# Patient Record
Sex: FEMALE | Race: WHITE | NOT HISPANIC OR LATINO | Employment: FULL TIME | URBAN - METROPOLITAN AREA
[De-identification: names, ages, dates, MRNs, and addresses within clinical notes are randomized per-mention and may not be internally consistent; named-entity substitution may affect disease eponyms.]

---

## 2017-04-25 ENCOUNTER — ALLSCRIPTS OFFICE VISIT (OUTPATIENT)
Dept: OTHER | Facility: OTHER | Age: 52
End: 2017-04-25

## 2017-05-01 ENCOUNTER — LAB CONVERSION - ENCOUNTER (OUTPATIENT)
Dept: OTHER | Facility: OTHER | Age: 52
End: 2017-05-01

## 2017-05-01 LAB
ADDITIONAL INFORMATION (HISTORICAL): NORMAL
ADEQUACY: (HISTORICAL): NORMAL
COMMENT (HISTORICAL): NORMAL
CYTOTECHNOLOGIST: (HISTORICAL): NORMAL
HPV MRNA E6/E7 (HISTORICAL): NOT DETECTED
INTERPRETATION (HISTORICAL): NORMAL
LMP (HISTORICAL): NORMAL
PREV. BX: (HISTORICAL): NORMAL
PREV. PAP (HISTORICAL): NORMAL
SOURCE (HISTORICAL): NORMAL

## 2017-05-24 ENCOUNTER — GENERIC CONVERSION - ENCOUNTER (OUTPATIENT)
Dept: OTHER | Facility: OTHER | Age: 52
End: 2017-05-24

## 2017-05-25 ENCOUNTER — HOSPITAL ENCOUNTER (OUTPATIENT)
Dept: MAMMOGRAPHY | Facility: MEDICAL CENTER | Age: 52
Discharge: HOME | End: 2017-05-25
Payer: COMMERCIAL

## 2017-05-25 DIAGNOSIS — Z12.31 ENCOUNTER FOR SCREENING MAMMOGRAM FOR MALIGNANT NEOPLASM OF BREAST: ICD-10-CM

## 2017-05-25 PROCEDURE — G0202 SCR MAMMO BI INCL CAD: HCPCS

## 2018-01-14 VITALS
BODY MASS INDEX: 34.27 KG/M2 | WEIGHT: 213.25 LBS | HEIGHT: 66 IN | DIASTOLIC BLOOD PRESSURE: 82 MMHG | SYSTOLIC BLOOD PRESSURE: 144 MMHG

## 2018-06-13 DIAGNOSIS — Z12.31 ENCOUNTER FOR SCREENING MAMMOGRAM FOR MALIGNANT NEOPLASM OF BREAST: Primary | ICD-10-CM

## 2018-07-02 ENCOUNTER — APPOINTMENT (OUTPATIENT)
Dept: LAB | Facility: CLINIC | Age: 53
End: 2018-07-02
Payer: COMMERCIAL

## 2018-07-02 ENCOUNTER — TRANSCRIBE ORDERS (OUTPATIENT)
Dept: LAB | Facility: CLINIC | Age: 53
End: 2018-07-02

## 2018-07-02 DIAGNOSIS — I15.9 SECONDARY HYPERTENSION: Primary | ICD-10-CM

## 2018-07-02 DIAGNOSIS — I15.9 SECONDARY HYPERTENSION: ICD-10-CM

## 2018-07-02 LAB
ALBUMIN SERPL BCP-MCNC: 3.9 G/DL (ref 3.5–5)
ALP SERPL-CCNC: 30 U/L (ref 46–116)
ALT SERPL W P-5'-P-CCNC: 29 U/L (ref 12–78)
ANION GAP SERPL CALCULATED.3IONS-SCNC: 8 MMOL/L (ref 4–13)
AST SERPL W P-5'-P-CCNC: 15 U/L (ref 5–45)
BASOPHILS # BLD AUTO: 0.05 THOUSANDS/ΜL (ref 0–0.1)
BASOPHILS NFR BLD AUTO: 1 % (ref 0–1)
BILIRUB SERPL-MCNC: 0.2 MG/DL (ref 0.2–1)
BUN SERPL-MCNC: 15 MG/DL (ref 5–25)
CALCIUM SERPL-MCNC: 9.5 MG/DL (ref 8.3–10.1)
CHLORIDE SERPL-SCNC: 103 MMOL/L (ref 100–108)
CHOLEST SERPL-MCNC: 232 MG/DL (ref 50–200)
CO2 SERPL-SCNC: 28 MMOL/L (ref 21–32)
CREAT SERPL-MCNC: 1 MG/DL (ref 0.6–1.3)
EOSINOPHIL # BLD AUTO: 0.27 THOUSAND/ΜL (ref 0–0.61)
EOSINOPHIL NFR BLD AUTO: 4 % (ref 0–6)
ERYTHROCYTE [DISTWIDTH] IN BLOOD BY AUTOMATED COUNT: 13.3 % (ref 11.6–15.1)
GFR SERPL CREATININE-BSD FRML MDRD: 65 ML/MIN/1.73SQ M
GLUCOSE P FAST SERPL-MCNC: 94 MG/DL (ref 65–99)
HCT VFR BLD AUTO: 38.6 % (ref 34.8–46.1)
HDLC SERPL-MCNC: 45 MG/DL (ref 40–60)
HGB BLD-MCNC: 12.8 G/DL (ref 11.5–15.4)
LDLC SERPL CALC-MCNC: 146 MG/DL (ref 0–100)
LYMPHOCYTES # BLD AUTO: 2.66 THOUSANDS/ΜL (ref 0.6–4.47)
LYMPHOCYTES NFR BLD AUTO: 36 % (ref 14–44)
MCH RBC QN AUTO: 31.1 PG (ref 26.8–34.3)
MCHC RBC AUTO-ENTMCNC: 33.2 G/DL (ref 31.4–37.4)
MCV RBC AUTO: 94 FL (ref 82–98)
MONOCYTES # BLD AUTO: 0.67 THOUSAND/ΜL (ref 0.17–1.22)
MONOCYTES NFR BLD AUTO: 9 % (ref 4–12)
NEUTROPHILS # BLD AUTO: 3.8 THOUSANDS/ΜL (ref 1.85–7.62)
NEUTS SEG NFR BLD AUTO: 51 % (ref 43–75)
NONHDLC SERPL-MCNC: 187 MG/DL
PLATELET # BLD AUTO: 274 THOUSANDS/UL (ref 149–390)
PMV BLD AUTO: 9.4 FL (ref 8.9–12.7)
POTASSIUM SERPL-SCNC: 4.3 MMOL/L (ref 3.5–5.3)
PROT SERPL-MCNC: 7.6 G/DL (ref 6.4–8.2)
RBC # BLD AUTO: 4.12 MILLION/UL (ref 3.81–5.12)
SODIUM SERPL-SCNC: 139 MMOL/L (ref 136–145)
TRIGL SERPL-MCNC: 207 MG/DL
WBC # BLD AUTO: 7.45 THOUSAND/UL (ref 4.31–10.16)

## 2018-07-02 PROCEDURE — 36415 COLL VENOUS BLD VENIPUNCTURE: CPT

## 2018-07-02 PROCEDURE — 85025 COMPLETE CBC W/AUTO DIFF WBC: CPT

## 2018-07-02 PROCEDURE — 80053 COMPREHEN METABOLIC PANEL: CPT

## 2018-07-02 PROCEDURE — 80061 LIPID PANEL: CPT

## 2018-07-09 ENCOUNTER — HOSPITAL ENCOUNTER (OUTPATIENT)
Dept: MAMMOGRAPHY | Facility: MEDICAL CENTER | Age: 53
Discharge: HOME | End: 2018-07-09
Payer: COMMERCIAL

## 2018-07-09 DIAGNOSIS — Z12.31 ENCOUNTER FOR SCREENING MAMMOGRAM FOR MALIGNANT NEOPLASM OF BREAST: ICD-10-CM

## 2018-07-09 PROCEDURE — 77063 BREAST TOMOSYNTHESIS BI: CPT

## 2018-07-09 PROCEDURE — 77067 SCR MAMMO BI INCL CAD: CPT

## 2018-07-23 ENCOUNTER — TRANSCRIBE ORDERS (OUTPATIENT)
Dept: NEUROSURGERY | Facility: CLINIC | Age: 53
End: 2018-07-23

## 2018-07-23 DIAGNOSIS — M54.50 LOWER BACK PAIN: Primary | ICD-10-CM

## 2018-08-28 ENCOUNTER — OFFICE VISIT (OUTPATIENT)
Dept: NEUROSURGERY | Facility: CLINIC | Age: 53
End: 2018-08-28
Payer: COMMERCIAL

## 2018-08-28 VITALS
SYSTOLIC BLOOD PRESSURE: 155 MMHG | BODY MASS INDEX: 34.97 KG/M2 | WEIGHT: 217.6 LBS | HEIGHT: 66 IN | HEART RATE: 88 BPM | DIASTOLIC BLOOD PRESSURE: 92 MMHG | TEMPERATURE: 99 F

## 2018-08-28 DIAGNOSIS — M54.59 LUMBAR FACET JOINT PAIN: ICD-10-CM

## 2018-08-28 DIAGNOSIS — M51.9 LUMBAR DISC DISEASE: ICD-10-CM

## 2018-08-28 DIAGNOSIS — M54.50 LOWER BACK PAIN: Primary | ICD-10-CM

## 2018-08-28 PROCEDURE — 99214 OFFICE O/P EST MOD 30 MIN: CPT | Performed by: NEUROLOGICAL SURGERY

## 2018-08-28 RX ORDER — MULTIVITAMIN
1 CAPSULE ORAL DAILY
COMMUNITY

## 2018-08-28 RX ORDER — TRANEXAMIC ACID 650 1/1
TABLET ORAL EVERY 8 HOURS
COMMUNITY
Start: 2017-04-25 | End: 2018-09-19 | Stop reason: SDUPTHER

## 2018-08-28 RX ORDER — METHYLPREDNISOLONE 4 MG/1
TABLET ORAL
Qty: 21 TABLET | Refills: 0 | Status: ON HOLD | OUTPATIENT
Start: 2018-08-28 | End: 2018-12-27

## 2018-08-28 RX ORDER — RIBOFLAVIN (VITAMIN B2) 100 MG
TABLET ORAL DAILY
COMMUNITY

## 2018-08-28 RX ORDER — FLUTICASONE PROPIONATE 50 MCG
SPRAY, SUSPENSION (ML) NASAL
COMMUNITY

## 2018-08-28 RX ORDER — PREGABALIN 75 MG/1
75 CAPSULE ORAL DAILY
Status: ON HOLD | COMMUNITY
End: 2018-12-27

## 2018-08-28 RX ORDER — LANOLIN ALCOHOL/MO/W.PET/CERES
3 CREAM (GRAM) TOPICAL
COMMUNITY

## 2018-08-28 RX ORDER — LISINOPRIL 10 MG/1
10 TABLET ORAL DAILY
Refills: 0 | COMMUNITY
Start: 2018-06-21 | End: 2021-05-05 | Stop reason: ALTCHOICE

## 2018-08-28 RX ORDER — ALBUTEROL SULFATE 90 UG/1
2 AEROSOL, METERED RESPIRATORY (INHALATION) EVERY 6 HOURS
COMMUNITY
Start: 2017-09-13

## 2018-08-28 RX ORDER — CHLORAL HYDRATE 500 MG
CAPSULE ORAL DAILY
COMMUNITY

## 2018-08-28 RX ORDER — ERGOCALCIFEROL 1.25 MG/1
CAPSULE ORAL
Refills: 3 | COMMUNITY
Start: 2018-08-20 | End: 2020-05-22 | Stop reason: ALTCHOICE

## 2018-08-28 RX ORDER — FEXOFENADINE HCL 180 MG/1
TABLET ORAL AS NEEDED
COMMUNITY

## 2018-08-28 RX ORDER — RANITIDINE HCL 75 MG
75 TABLET ORAL 2 TIMES DAILY
COMMUNITY
End: 2020-05-22 | Stop reason: ALTCHOICE

## 2018-08-28 RX ORDER — METHOCARBAMOL 750 MG/1
750 TABLET, FILM COATED ORAL 2 TIMES DAILY
COMMUNITY

## 2018-08-28 RX ORDER — MELOXICAM 15 MG/1
15 TABLET ORAL DAILY
COMMUNITY
Start: 2018-08-14

## 2018-08-28 RX ORDER — DULOXETIN HYDROCHLORIDE 30 MG/1
30 CAPSULE, DELAYED RELEASE ORAL DAILY
COMMUNITY

## 2018-08-28 NOTE — LETTER
August 28, 2018     Niurka Weeks MD  [address] [place] [facility] [id number]    Patient: Chantel Faith   YOB: 1965   Date of Visit: 8/28/2018       Dear Dr Marta Allen: Thank you for referring Elzbieta River to me for evaluation  Below are my notes for this consultation  If you have questions, please do not hesitate to call me  I look forward to following your patient along with you  Sincerely,        Gunner Carroll MD        CC: Chasity Roque MD  8/28/2018  5:15 PM  Sign at close encounter  Office Note - Neurosurgery   Chantel Faith 48 y o  female MRN: [id number]      Assessment:    Patient is stable  Symptoms, as detailed in HPI, continue to significantly impact of patient's quality of life in daily activities  After carefully considering presentation, investigations, functional status and co-morbidities, the risk/benefit profile of surgical intervention is not favorable  31-year-old woman with lower back pain which is likely facetogenic in nature though there is an element of deconditioning as well  SI joint dysfunction seems less likely  This is her dominant symptom  I explained that surgery for lower back pain is generally not effective  I asked her to follow up with her pain specialist to discuss facet rhizotomy seeing as how she had a significant improvement in her lower back pain following facet injections  I provided her with a Medrol Dosepak after reviewing the common side effects and interactions  She has not take NSAIDs while taking a Medrol Dosepak  Would also recommend she try physical therapy aqua therapy for core strengthening and range of motion exercises  I reviewed the signs and symptoms lumbar radiculopathy and asked her to contact my office should any concerns arise  Otherwise,   she will follow up through this office on a p r n  basis                                       History, physical examination and diagnostic tests were reviewed and questions answered  Diagnosis, care plan and treatment options were discussed  The patient understand instructions and will follow up as directed  Plan:    Follow-up: prn    Problem List Items Addressed This Visit        Musculoskeletal and Integument    Lumbar disc disease       Other    Lumbar facet joint pain      Other Visit Diagnoses     Lower back pain    -  Primary    Relevant Medications    Methylprednisolone 4 MG TBPK          Subjective/Objective     Chief Complaint    Low back pain  HPI    51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 and hamstring  There is no particular inciting event  She currently describes 3 to 7/10 pain across the lower back which increases the sitting and is particularly worse at night  She denies any pain, weakness or numbness in the right leg  She describes some pain in the left buttock and hamstring which she rates as 3 to 6/10  This to seems to increase with sitting  When the pain is severe her leg will feel somewhat weak but she denies any numbness in the left leg  Sitting or going from sitting to standing seems to exacerbate her pain while walking seems to improved to some extent  She denies any difficulties with bowel bladder function or changing perineal sensation  Epidural steroid injections were not helpful  Facet injections provided 6 weeks of improved pain, though she did not proceed with rhizotomy  She has not had any SI joint injections  Meloxicam, Robaxin, Lyrica and Cymbalta are somewhat helpful  She has not tried any physical therapy or active therapy  ROS    Review of Systems   Constitutional: Positive for fatigue  HENT: Negative  Eyes: Negative  Respiratory: Positive for shortness of breath (on exertion )  Cardiovascular: Negative  Gastrointestinal: Positive for constipation  Endocrine: Negative      Genitourinary: Positive for urgency  Leakage   Musculoskeletal: Positive for myalgias (across lower back, muscle spasms )  Negative for back pain (left buttock down left leg)  Skin: Negative  Allergic/Immunologic: Positive for environmental allergies (seasonal )  Neurological: Positive for weakness (left leg), light-headedness and headaches (migraines)  Negative for dizziness, seizures, syncope and numbness  Hematological: Does not bruise/bleed easily (patient on omega 3)  Psychiatric/Behavioral: Positive for sleep disturbance (due to pain )  Negative for confusion  Family History    Family History   Problem Relation Age of Onset    Heart disease Father        Social History    Social History     Social History    Marital status: /Civil Union     Spouse name: N/A    Number of children: N/A    Years of education: N/A     Occupational History    Not on file       Social History Main Topics    Smoking status: Never Smoker    Smokeless tobacco: Never Used    Alcohol use Yes      Comment: social    Drug use: No    Sexual activity: Not on file     Other Topics Concern    Not on file     Social History Narrative    No narrative on file       Past Medical History    Past Medical History:   Diagnosis Date    Ankle fracture     Asthma     Cervical spinal stenosis     Closed left arm fracture, sequela     Concussion     Degeneration, intervertebral disc, cervical     Hypertension     Migraine     Right arm fracture     Seasonal allergies     Shingles     Vitamin D deficiency        Surgical History    Past Surgical History:   Procedure Laterality Date    CARPAL TUNNEL RELEASE Bilateral     MOUTH SURGERY      SINUS SURGERY      TUBAL LIGATION         Medications        Current Outpatient Prescriptions:     albuterol (PROVENTIL HFA,VENTOLIN HFA) 90 mcg/act inhaler, Inhale 2 puffs every 6 (six) hours, Disp: , Rfl:     Ascorbic Acid (VITAMIN C) 100 MG tablet, Take by mouth daily, Disp: , Rfl:    DULoxetine (CYMBALTA) 30 mg delayed release capsule, Take 30 mg by mouth daily, Disp: , Rfl:     ergocalciferol (VITAMIN D2) 50,000 units, TAKE 1 CAPSULE TWICE A WEEK FOR ONE YEAR, Disp: , Rfl: 3    fexofenadine (ALLEGRA) 180 MG tablet, Take by mouth as needed, Disp: , Rfl:     fluticasone (FLONASE) 50 mcg/act nasal spray,  2 by nasal route , Disp: , Rfl:     lisinopril (ZESTRIL) 10 mg tablet, Take 10 mg by mouth daily, Disp: , Rfl: 0    melatonin 3 mg, Take 3 mg by mouth daily at bedtime, Disp: , Rfl:     meloxicam (MOBIC) 15 mg tablet, Take 15 mg by mouth daily, Disp: , Rfl:     methocarbamol (ROBAXIN) 750 mg tablet, Take 750 mg by mouth 2 (two) times a day, Disp: , Rfl:     Multiple Vitamin (MULTIVITAMIN) capsule, Take 1 capsule by mouth daily, Disp: , Rfl:     Omega-3 1000 MG CAPS, Take by mouth daily, Disp: , Rfl:     pregabalin (LYRICA) 75 mg capsule, Take 75 mg by mouth daily, Disp: , Rfl:     ranitidine (ZANTAC) 75 MG tablet, Take 75 mg by mouth 2 (two) times a day, Disp: , Rfl:     Tranexamic Acid 650 MG TABS, Take by mouth every 8 (eight) hours, Disp: , Rfl:     Methylprednisolone 4 MG TBPK, Use as directed on package, Disp: 21 tablet, Rfl: 0      Allergies    Allergies   Allergen Reactions    Amoxicillin-Pot Clavulanate Hives    Penicillins        The following portions of the patient's history were reviewed and updated as appropriate: allergies, current medications, past family history, past medical history, past social history, past surgical history and problem list     Investigations    I personally reviewed the MRI results with the patient:    MRI of the lumbar spine without contrast dated February 12th, 2018  Normal lumbar lordosis  Degenerative disc disease at L5-S1  Broad-based disc bulge with minimal contact on traversing S1 nerve root  No other areas of significant neural compression      Physical Exam    Vitals:  Blood pressure 155/92, pulse 88, temperature 99 °F (37 2 °C), height 5' 6" (1 676 m), weight 98 7 kg (217 lb 9 6 oz), last menstrual period 08/05/2018  ,Body mass index is 35 12 kg/m²  Physical Exam   Constitutional: She is oriented to person, place, and time  She appears well-developed and well-nourished  No distress  Musculoskeletal:        Lumbar back: She exhibits decreased range of motion and tenderness  Extension produces lower back pain  Positive Ruth's test bilaterally, more so on the left than on the right  Neurological: She is alert and oriented to person, place, and time  Gait normal    Reflex Scores:       Patellar reflexes are 1+ on the right side and 1+ on the left side  Achilles reflexes are 1+ on the right side and 1+ on the left side  Skin: Skin is warm and dry  Psychiatric: She has a normal mood and affect  Her behavior is normal    Vitals reviewed  Neurologic Exam     Mental Status   Oriented to person, place, and time  Motor Exam   Muscle bulk: normal  Overall muscle tone: normal5/5 power in lower extremities       Sensory Exam   Right leg light touch: normal  Left leg light touch: normal  Right leg pinprick: normal  Left leg pinprick: normal    Gait, Coordination, and Reflexes     Gait  Gait: normal    Reflexes   Right patellar: 1+  Left patellar: 1+  Right achilles: 1+  Left achilles: 1+  Right ankle clonus: absent  Left ankle clonus: absent

## 2018-08-28 NOTE — PROGRESS NOTES
Office Note - Neurosurgery   Kiara Carter 48 y o  female MRN: [id number]      Assessment:    Patient is stable  Symptoms, as detailed in HPI, continue to significantly impact of patient's quality of life in daily activities  After carefully considering presentation, investigations, functional status and co-morbidities, the risk/benefit profile of surgical intervention is not favorable  41-year-old woman with lower back pain which is likely facetogenic in nature though there is an element of deconditioning as well  SI joint dysfunction seems less likely  This is her dominant symptom  I explained that surgery for lower back pain is generally not effective  I asked her to follow up with her pain specialist to discuss facet rhizotomy seeing as how she had a significant improvement in her lower back pain following facet injections  I provided her with a Medrol Dosepak after reviewing the common side effects and interactions  She has not take NSAIDs while taking a Medrol Dosepak  Would also recommend she try physical therapy aqua therapy for core strengthening and range of motion exercises  I reviewed the signs and symptoms lumbar radiculopathy and asked her to contact my office should any concerns arise  Otherwise,   she will follow up through this office on a p r n  basis  History, physical examination and diagnostic tests were reviewed and questions answered  Diagnosis, care plan and treatment options were discussed  The patient understand instructions and will follow up as directed  Plan:    Follow-up: prn    Problem List Items Addressed This Visit        Musculoskeletal and Integument    Lumbar disc disease       Other    Lumbar facet joint pain      Other Visit Diagnoses     Lower back pain    -  Primary    Relevant Medications    Methylprednisolone 4 MG TBPK          Subjective/Objective     Chief Complaint    Low back pain      HPI    41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 and hamstring  There is no particular inciting event  She currently describes 3 to 7/10 pain across the lower back which increases the sitting and is particularly worse at night  She denies any pain, weakness or numbness in the right leg  She describes some pain in the left buttock and hamstring which she rates as 3 to 6/10  This to seems to increase with sitting  When the pain is severe her leg will feel somewhat weak but she denies any numbness in the left leg  Sitting or going from sitting to standing seems to exacerbate her pain while walking seems to improved to some extent  She denies any difficulties with bowel bladder function or changing perineal sensation  Epidural steroid injections were not helpful  Facet injections provided 6 weeks of improved pain, though she did not proceed with rhizotomy  She has not had any SI joint injections  Meloxicam, Robaxin, Lyrica and Cymbalta are somewhat helpful  She has not tried any physical therapy or active therapy  ROS    Review of Systems   Constitutional: Positive for fatigue  HENT: Negative  Eyes: Negative  Respiratory: Positive for shortness of breath (on exertion )  Cardiovascular: Negative  Gastrointestinal: Positive for constipation  Endocrine: Negative  Genitourinary: Positive for urgency  Leakage   Musculoskeletal: Positive for myalgias (across lower back, muscle spasms )  Negative for back pain (left buttock down left leg)  Skin: Negative  Allergic/Immunologic: Positive for environmental allergies (seasonal )  Neurological: Positive for weakness (left leg), light-headedness and headaches (migraines)  Negative for dizziness, seizures, syncope and numbness  Hematological: Does not bruise/bleed easily (patient on omega 3)  Psychiatric/Behavioral: Positive for sleep disturbance (due to pain )   Negative for confusion  Family History    Family History   Problem Relation Age of Onset    Heart disease Father        Social History    Social History     Social History    Marital status: /Civil Union     Spouse name: N/A    Number of children: N/A    Years of education: N/A     Occupational History    Not on file       Social History Main Topics    Smoking status: Never Smoker    Smokeless tobacco: Never Used    Alcohol use Yes      Comment: social    Drug use: No    Sexual activity: Not on file     Other Topics Concern    Not on file     Social History Narrative    No narrative on file       Past Medical History    Past Medical History:   Diagnosis Date    Ankle fracture     Asthma     Cervical spinal stenosis     Closed left arm fracture, sequela     Concussion     Degeneration, intervertebral disc, cervical     Hypertension     Migraine     Right arm fracture     Seasonal allergies     Shingles     Vitamin D deficiency        Surgical History    Past Surgical History:   Procedure Laterality Date    CARPAL TUNNEL RELEASE Bilateral     MOUTH SURGERY      SINUS SURGERY      TUBAL LIGATION         Medications        Current Outpatient Prescriptions:     albuterol (PROVENTIL HFA,VENTOLIN HFA) 90 mcg/act inhaler, Inhale 2 puffs every 6 (six) hours, Disp: , Rfl:     Ascorbic Acid (VITAMIN C) 100 MG tablet, Take by mouth daily, Disp: , Rfl:     DULoxetine (CYMBALTA) 30 mg delayed release capsule, Take 30 mg by mouth daily, Disp: , Rfl:     ergocalciferol (VITAMIN D2) 50,000 units, TAKE 1 CAPSULE TWICE A WEEK FOR ONE YEAR, Disp: , Rfl: 3    fexofenadine (ALLEGRA) 180 MG tablet, Take by mouth as needed, Disp: , Rfl:     fluticasone (FLONASE) 50 mcg/act nasal spray,  2 by nasal route , Disp: , Rfl:     lisinopril (ZESTRIL) 10 mg tablet, Take 10 mg by mouth daily, Disp: , Rfl: 0    melatonin 3 mg, Take 3 mg by mouth daily at bedtime, Disp: , Rfl:     meloxicam (MOBIC) 15 mg tablet, Take 15 mg by mouth daily, Disp: , Rfl:     methocarbamol (ROBAXIN) 750 mg tablet, Take 750 mg by mouth 2 (two) times a day, Disp: , Rfl:     Multiple Vitamin (MULTIVITAMIN) capsule, Take 1 capsule by mouth daily, Disp: , Rfl:     Omega-3 1000 MG CAPS, Take by mouth daily, Disp: , Rfl:     pregabalin (LYRICA) 75 mg capsule, Take 75 mg by mouth daily, Disp: , Rfl:     ranitidine (ZANTAC) 75 MG tablet, Take 75 mg by mouth 2 (two) times a day, Disp: , Rfl:     Tranexamic Acid 650 MG TABS, Take by mouth every 8 (eight) hours, Disp: , Rfl:     Methylprednisolone 4 MG TBPK, Use as directed on package, Disp: 21 tablet, Rfl: 0      Allergies    Allergies   Allergen Reactions    Amoxicillin-Pot Clavulanate Hives    Penicillins        The following portions of the patient's history were reviewed and updated as appropriate: allergies, current medications, past family history, past medical history, past social history, past surgical history and problem list     Investigations    I personally reviewed the MRI results with the patient:    MRI of the lumbar spine without contrast dated February 12th, 2018  Normal lumbar lordosis  Degenerative disc disease at L5-S1  Broad-based disc bulge with minimal contact on traversing S1 nerve root  No other areas of significant neural compression  Physical Exam    Vitals:  Blood pressure 155/92, pulse 88, temperature 99 °F (37 2 °C), height 5' 6" (1 676 m), weight 98 7 kg (217 lb 9 6 oz), last menstrual period 08/05/2018  ,Body mass index is 35 12 kg/m²  Physical Exam   Constitutional: She is oriented to person, place, and time  She appears well-developed and well-nourished  No distress  Musculoskeletal:        Lumbar back: She exhibits decreased range of motion and tenderness  Extension produces lower back pain  Positive Ruth's test bilaterally, more so on the left than on the right  Neurological: She is alert and oriented to person, place, and time   Gait normal  Reflex Scores:       Patellar reflexes are 1+ on the right side and 1+ on the left side  Achilles reflexes are 1+ on the right side and 1+ on the left side  Skin: Skin is warm and dry  Psychiatric: She has a normal mood and affect  Her behavior is normal    Vitals reviewed  Neurologic Exam     Mental Status   Oriented to person, place, and time  Motor Exam   Muscle bulk: normal  Overall muscle tone: normal5/5 power in lower extremities       Sensory Exam   Right leg light touch: normal  Left leg light touch: normal  Right leg pinprick: normal  Left leg pinprick: normal    Gait, Coordination, and Reflexes     Gait  Gait: normal    Reflexes   Right patellar: 1+  Left patellar: 1+  Right achilles: 1+  Left achilles: 1+  Right ankle clonus: absent  Left ankle clonus: absent

## 2018-08-28 NOTE — LETTER
August 28, 2018     Tania Marr MD  [address] [address]    Patient: Selina Michaels   YOB: 1965   Date of Visit: 8/28/2018       Dear Dr Cecilio Khan: Thank you for referring Elise Frost to me for evaluation  Below are my notes for this consultation  If you have questions, please do not hesitate to call me  I look forward to following your patient along with you  Sincerely,        Shefali Villarreal MD        CC: Donald Otero MD  8/28/2018  4:51 PM  Sign at close encounter  Office Note - Neurosurgery   Selina Michaels 48 y o  female MRN: [id number]      Assessment:    Patient is stable  Symptoms, as detailed in HPI, continue to significantly impact of patient's quality of life in daily activities  After carefully considering presentation, investigations, functional status and co-morbidities, the risk/benefit profile of surgical intervention is not favorable  51-year-old woman with lower back pain which is likely facetogenic in nature though there is an element of deconditioning as well  SI joint dysfunction seems less likely  This is her dominant symptom  I explained that surgery for lower back pain is generally not effective  I asked her to follow up with her pain specialist to discuss facet rhizotomy seeing as how she had a significant improvement in her lower back pain following facet injections  I provided her with a Medrol Dosepak after reviewing the common side effects and interactions  She has not take NSAIDs while taking a Medrol Dosepak  Would also recommend she try physical therapy aqua therapy for core strengthening and range of motion exercises  I reviewed the signs and symptoms lumbar radiculopathy and asked her to contact my office should any concerns arise  Otherwise,   she will follow up through this office on a p r n  basis                                       History, physical examination and diagnostic tests were reviewed and questions answered  Diagnosis, care plan and treatment options were discussed  The patient understand instructions and will follow up as directed  Plan:    Follow-up: prn    Problem List Items Addressed This Visit        Musculoskeletal and Integument    Lumbar disc disease       Other    Lumbar facet joint pain      Other Visit Diagnoses     Lower back pain    -  Primary    Relevant Medications    Methylprednisolone 4 MG TBPK          Subjective/Objective     Chief Complaint    Low back pain  HPI        6+ months  lbp spasms, 3-7/10, inc with sitting, night  r leg ok  Left buttock and hamsting, 3-6/10, inc with sitting, some weakness with pain, no numbness  nbbp    juana nh, facet h no rhizotomy, no sij, meloxicam robaxin lyrica cymbalata h, no pt        ROS    Review of Systems   Constitutional: Positive for fatigue  HENT: Negative  Eyes: Negative  Respiratory: Positive for shortness of breath (on exertion )  Cardiovascular: Negative  Gastrointestinal: Positive for constipation  Endocrine: Negative  Genitourinary: Positive for urgency  Leakage   Musculoskeletal: Positive for myalgias (across lower back, muscle spasms )  Negative for back pain (left buttock down left leg)  Skin: Negative  Allergic/Immunologic: Positive for environmental allergies (seasonal )  Neurological: Positive for weakness (left leg), light-headedness and headaches (migraines)  Negative for dizziness, seizures, syncope and numbness  Hematological: Does not bruise/bleed easily (patient on omega 3)  Psychiatric/Behavioral: Positive for sleep disturbance (due to pain )  Negative for confusion         Family History    Family History   Problem Relation Age of Onset    Heart disease Father        Social History    Social History     Social History    Marital status: /Civil Union     Spouse name: N/A    Number of children: N/A    Years of education: N/A     Occupational History    Not on file       Social History Main Topics    Smoking status: Never Smoker    Smokeless tobacco: Never Used    Alcohol use Yes      Comment: social    Drug use: No    Sexual activity: Not on file     Other Topics Concern    Not on file     Social History Narrative    No narrative on file       Past Medical History    Past Medical History:   Diagnosis Date    Ankle fracture     Asthma     Cervical spinal stenosis     Closed left arm fracture, sequela     Concussion     Degeneration, intervertebral disc, cervical     Hypertension     Migraine     Right arm fracture     Seasonal allergies     Shingles     Vitamin D deficiency        Surgical History    Past Surgical History:   Procedure Laterality Date    CARPAL TUNNEL RELEASE Bilateral     MOUTH SURGERY      SINUS SURGERY      TUBAL LIGATION         Medications      Current Outpatient Prescriptions:     albuterol (PROVENTIL HFA,VENTOLIN HFA) 90 mcg/act inhaler, Inhale 2 puffs every 6 (six) hours, Disp: , Rfl:     Ascorbic Acid (VITAMIN C) 100 MG tablet, Take by mouth daily, Disp: , Rfl:     DULoxetine (CYMBALTA) 30 mg delayed release capsule, Take 30 mg by mouth daily, Disp: , Rfl:     ergocalciferol (VITAMIN D2) 50,000 units, TAKE 1 CAPSULE TWICE A WEEK FOR ONE YEAR, Disp: , Rfl: 3    fexofenadine (ALLEGRA) 180 MG tablet, Take by mouth as needed, Disp: , Rfl:     fluticasone (FLONASE) 50 mcg/act nasal spray,  2 {spray}s by nasal route , Disp: , Rfl:     lisinopril (ZESTRIL) 10 mg tablet, Take 10 mg by mouth daily, Disp: , Rfl: 0    melatonin 3 mg, Take 3 mg by mouth daily at bedtime, Disp: , Rfl:     meloxicam (MOBIC) 15 mg tablet, Take 15 mg by mouth daily, Disp: , Rfl:     methocarbamol (ROBAXIN) 750 mg tablet, Take 750 mg by mouth 2 (two) times a day, Disp: , Rfl:     Multiple Vitamin (MULTIVITAMIN) capsule, Take 1 capsule by mouth daily, Disp: , Rfl:     Omega-3 1000 MG CAPS, Take by mouth daily, Disp: , Rfl:    pregabalin (LYRICA) 75 mg capsule, Take 75 mg by mouth daily, Disp: , Rfl:     ranitidine (ZANTAC) 75 MG tablet, Take 75 mg by mouth 2 (two) times a day, Disp: , Rfl:     Tranexamic Acid 650 MG TABS, Take by mouth every 8 (eight) hours, Disp: , Rfl:     Methylprednisolone 4 MG TBPK, Use as directed on package, Disp: 21 tablet, Rfl: 0    Allergies    Allergies   Allergen Reactions    Amoxicillin-Pot Clavulanate Hives    Penicillins        The following portions of the patient's history were reviewed and updated as appropriate: allergies, current medications, past family history, past medical history, past social history, past surgical history and problem list     Investigations    I personally reviewed the MRI results with the patient:    MRI of the lumbar spine without contrast dated February 12th, 2018  Normal lumbar lordosis  Degenerative disc disease at L5-S1  Broad-based disc bulge with minimal contact on traversing S1 nerve root  No other areas of significant neural compression  Physical Exam    Vitals:  Blood pressure 155/92, pulse 88, temperature 99 °F (37 2 °C), height 5' 6" (1 676 m), weight 98 7 kg (217 lb 9 6 oz), last menstrual period 08/05/2018  ,Body mass index is 35 12 kg/m²  Physical Exam   Constitutional: She is oriented to person, place, and time  She appears well-developed and well-nourished  No distress  Musculoskeletal:        Lumbar back: She exhibits decreased range of motion and tenderness  Extension produces lower back pain  Positive Ruth's test bilaterally, more so on the left than on the right  Neurological: She is alert and oriented to person, place, and time  Gait normal    Reflex Scores:       Patellar reflexes are 1+ on the right side and 1+ on the left side  Achilles reflexes are 1+ on the right side and 1+ on the left side  Skin: Skin is warm and dry  Psychiatric: She has a normal mood and affect  Her behavior is normal    Vitals reviewed      Neurologic Exam     Mental Status   Oriented to person, place, and time  Motor Exam   Muscle bulk: normal  Overall muscle tone: normal5/5 power in lower extremities       Sensory Exam   Right leg light touch: normal  Left leg light touch: normal  Right leg pinprick: normal  Left leg pinprick: normal    Gait, Coordination, and Reflexes     Gait  Gait: normal    Reflexes   Right patellar: 1+  Left patellar: 1+  Right achilles: 1+  Left achilles: 1+  Right ankle clonus: absent  Left ankle clonus: absent

## 2018-09-05 ENCOUNTER — TELEPHONE (OUTPATIENT)
Dept: NEUROSURGERY | Facility: CLINIC | Age: 53
End: 2018-09-05

## 2018-09-19 ENCOUNTER — ANNUAL EXAM (OUTPATIENT)
Dept: OBGYN CLINIC | Facility: CLINIC | Age: 53
End: 2018-09-19
Payer: COMMERCIAL

## 2018-09-19 VITALS
HEIGHT: 66 IN | SYSTOLIC BLOOD PRESSURE: 146 MMHG | BODY MASS INDEX: 34.07 KG/M2 | WEIGHT: 212 LBS | DIASTOLIC BLOOD PRESSURE: 92 MMHG

## 2018-09-19 DIAGNOSIS — N93.9 ABNORMAL UTERINE BLEEDING (AUB): ICD-10-CM

## 2018-09-19 DIAGNOSIS — Z12.39 BREAST CANCER SCREENING: ICD-10-CM

## 2018-09-19 DIAGNOSIS — Z01.419 WOMEN'S ANNUAL ROUTINE GYNECOLOGICAL EXAMINATION: Primary | ICD-10-CM

## 2018-09-19 PROCEDURE — 99396 PREV VISIT EST AGE 40-64: CPT | Performed by: OBSTETRICS & GYNECOLOGY

## 2018-09-19 RX ORDER — TRANEXAMIC ACID 650 1/1
TABLET ORAL
Qty: 60 TABLET | Refills: 4 | Status: SHIPPED | OUTPATIENT
Start: 2018-09-19 | End: 2020-05-22 | Stop reason: ALTCHOICE

## 2018-09-19 NOTE — PATIENT INSTRUCTIONS
The patient was informed of a stable claudia menopausal gyn examination  A Pap smear was not performed  She will still make arrangements for mammograms  Her colonoscopy is up-to-date  She will follow up with pain management for chronic pain  She should return my office in 1 year

## 2018-09-19 NOTE — PROGRESS NOTES
79-year-old white female, she is a  3 para 3 with 3 prior vaginal deliveries  Her current method of contraception includes a tubal ligation  Her menstrual cycles are becoming more irregular associated with hot flashes and night sweats consistent with perimenopause  She has a history of abnormal uterine bleeding with benign workup  She has been using Lysteda for heavy cycles  Over the last year she is use Lysteda last   She is now requesting a refill  She is also taking medication for fibromyalgia and hypertension  There is no problem with intimacy unless her back is flaring  She is also being seen by pain management  She has appoint with today  Review of systems consistent with chronic pain secondary to spinal disease,      Medical history is includes hypertension, and fibromyalgia      Surgical history significant for back surgery and a tubal ligation      Family history significant for myocardial infarction, cancer, and heart attack      Social history is negative for tobacco positive for social alcohol        Physical exam this is a well-developed well-nourished white female acute distress her HEENT is was within normal limits  Cardiac exam shows a regular rhythm and rate  Lungs are clear to auscultation  Breast exam symmetrical nontender no masses no retraction axilla clear bilaterally  Abdomen is softer no masses positive bowel sounds  Pelvic exam the external genitalia normal limits vagina is clean the uterus is anterior normal size a Pap smear was not performed cervix is parous  Adnexa clear bilaterally      Impression stable claudia menopausal gyn examination  Will refill Lysteda to use as needed  She will make her mammogram is needed  She will continue follow-up with pain management secondary to spinal disease  She should return my office in 1 year  She should make arrangements for mammogram   Her colonoscopies are up-to-date

## 2018-09-24 ENCOUNTER — HOSPITAL ENCOUNTER (OUTPATIENT)
Dept: CT IMAGING | Facility: HOSPITAL | Age: 53
Discharge: HOME | End: 2018-09-24
Attending: UROLOGY
Payer: COMMERCIAL

## 2018-09-24 DIAGNOSIS — N23 RENAL COLIC ON LEFT SIDE: Primary | ICD-10-CM

## 2018-09-24 DIAGNOSIS — N23 RENAL COLIC ON LEFT SIDE: ICD-10-CM

## 2018-09-24 PROCEDURE — 74176 CT ABD & PELVIS W/O CONTRAST: CPT

## 2018-09-26 ENCOUNTER — TELEPHONE (OUTPATIENT)
Dept: UROLOGY | Facility: AMBULATORY SURGERY CENTER | Age: 53
End: 2018-09-26

## 2018-09-26 DIAGNOSIS — N20.1 URETERAL CALCULUS, LEFT: Primary | ICD-10-CM

## 2018-09-26 RX ORDER — TAMSULOSIN HYDROCHLORIDE 0.4 MG/1
0.4 CAPSULE ORAL
Qty: 15 CAPSULE | Refills: 0 | Status: ON HOLD | OUTPATIENT
Start: 2018-09-26 | End: 2018-12-27

## 2018-09-27 NOTE — TELEPHONE ENCOUNTER
Patient spoke to Dr Fabian Licea,  Follow up scheduled for 10/8/18 at 9 am [facility]

## 2018-10-08 ENCOUNTER — APPOINTMENT (OUTPATIENT)
Dept: RADIOLOGY | Facility: MEDICAL CENTER | Age: 53
End: 2018-10-08
Payer: COMMERCIAL

## 2018-10-08 DIAGNOSIS — N23 RENAL COLIC ON LEFT SIDE: ICD-10-CM

## 2018-10-08 PROCEDURE — 74018 RADEX ABDOMEN 1 VIEW: CPT

## 2018-10-11 ENCOUNTER — HOSPITAL ENCOUNTER (OUTPATIENT)
Dept: ULTRASOUND IMAGING | Facility: HOSPITAL | Age: 53
Discharge: HOME | End: 2018-10-11
Attending: UROLOGY
Payer: COMMERCIAL

## 2018-10-11 DIAGNOSIS — N23 RENAL COLIC ON LEFT SIDE: ICD-10-CM

## 2018-10-11 PROCEDURE — 76770 US EXAM ABDO BACK WALL COMP: CPT

## 2018-10-12 ENCOUNTER — TELEPHONE (OUTPATIENT)
Dept: GASTROENTEROLOGY | Facility: AMBULARY SURGERY CENTER | Age: 53
End: 2018-10-12

## 2018-11-26 ENCOUNTER — APPOINTMENT (OUTPATIENT)
Dept: LAB | Facility: CLINIC | Age: 53
End: 2018-11-26
Payer: COMMERCIAL

## 2018-11-26 ENCOUNTER — TRANSCRIBE ORDERS (OUTPATIENT)
Dept: LAB | Facility: CLINIC | Age: 53
End: 2018-11-26

## 2018-11-26 ENCOUNTER — OFFICE VISIT (OUTPATIENT)
Dept: GASTROENTEROLOGY | Facility: AMBULARY SURGERY CENTER | Age: 53
End: 2018-11-26
Payer: COMMERCIAL

## 2018-11-26 VITALS
WEIGHT: 205.6 LBS | HEART RATE: 86 BPM | BODY MASS INDEX: 33.04 KG/M2 | HEIGHT: 66 IN | DIASTOLIC BLOOD PRESSURE: 82 MMHG | SYSTOLIC BLOOD PRESSURE: 138 MMHG | RESPIRATION RATE: 18 BRPM | TEMPERATURE: 98.4 F

## 2018-11-26 DIAGNOSIS — Z12.11 COLON CANCER SCREENING: ICD-10-CM

## 2018-11-26 DIAGNOSIS — R19.7 DIARRHEA, UNSPECIFIED TYPE: ICD-10-CM

## 2018-11-26 DIAGNOSIS — R19.7 DIARRHEA, UNSPECIFIED TYPE: Primary | ICD-10-CM

## 2018-11-26 LAB
CRP SERPL QL: <3 MG/L
ERYTHROCYTE [SEDIMENTATION RATE] IN BLOOD: 16 MM/HOUR (ref 0–20)

## 2018-11-26 PROCEDURE — 86255 FLUORESCENT ANTIBODY SCREEN: CPT

## 2018-11-26 PROCEDURE — 36415 COLL VENOUS BLD VENIPUNCTURE: CPT

## 2018-11-26 PROCEDURE — 82784 ASSAY IGA/IGD/IGG/IGM EACH: CPT

## 2018-11-26 PROCEDURE — 99244 OFF/OP CNSLTJ NEW/EST MOD 40: CPT | Performed by: INTERNAL MEDICINE

## 2018-11-26 PROCEDURE — 86140 C-REACTIVE PROTEIN: CPT

## 2018-11-26 PROCEDURE — 83516 IMMUNOASSAY NONANTIBODY: CPT

## 2018-11-26 PROCEDURE — 85652 RBC SED RATE AUTOMATED: CPT

## 2018-11-26 RX ORDER — DICYCLOMINE HYDROCHLORIDE 10 MG/1
10 CAPSULE ORAL 3 TIMES DAILY PRN
Qty: 90 CAPSULE | Refills: 3 | Status: SHIPPED | OUTPATIENT
Start: 2018-11-26

## 2018-11-26 NOTE — PROGRESS NOTES
Consultation - [facility]  Harrison Pulido 48 y o  female MRN: [id number]  Unit/Bed#:  Encounter: [id number]        Consults    ASSESSMENT/PLAN:   1  Alternating predominant diarrhea with mild constipation for the past 2 weeks with small amounts of blood change stool-suspect infectious colitis versus inflammatory bowel disease versus less likely post infectious IBS  -will check stool studies including ova and parasites, C diff and stool cultures   -check celiac serologies   -check inflammatory markers including ESR and CRP  -obtain records from Urgent Care regarding CBC and CMP which was done several days ago  2   Colon cancer screening-average risk, no prior colonoscopy, will schedule screening colonoscopy at this time  ______________________________________________________________________    Reason for Consult / Principal Problem: [unfilled]    HPI: Harrison Pulido is a 48y o  year old female who presents for evaluation of alternating diarrhea and constipation for the past several weeks  Patient states that she has had alternating diarrhea and constipation for many years however over the past 2 weeks she seems to be having acute episodes of cramping abdominal pain followed by diarrhea followed by several days of constipation  She states that symptoms occur almost 1 hr after eating, most recently have had blood and mucus in the stool  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, no recent travel or antibiotic use  She went to the urgent care center several days ago and was told that she had mild leukocytosis  I do not have these labs  No stool studies were done  Patient states that she has been on bland diet but despite this, she continues to have cramping and diarrhea sometimes with small amounts of blood  She denies any heartburn symptoms, nausea, vomiting, hematemesis or melena     She denies any family history of GI malignancy, no previous colonoscopy  Review of Systems: The remainder of the review of systems was negative except for the pertinent positives noted in HPI  Historical Information   Past Medical History:   Diagnosis Date    Ankle fracture     Asthma     Cervical spinal stenosis     Closed left arm fracture, sequela     Concussion     Degeneration, intervertebral disc, cervical     Hypertension     Kidney stone     Migraine     Right arm fracture     Seasonal allergies     Shingles     Vitamin D deficiency      Past Surgical History:   Procedure Laterality Date    CARPAL TUNNEL RELEASE Bilateral     CATARACT EXTRACTION Left     MOUTH SURGERY      SINUS SURGERY      TUBAL LIGATION       Social History   History   Alcohol Use    Yes     Comment: social     History   Drug Use No     History   Smoking Status    Never Smoker   Smokeless Tobacco    Never Used     Family History   Problem Relation Age of Onset    Heart disease Father        Meds/Allergies       (Not in a hospital admission)  No current facility-administered medications for this visit  Allergies   Allergen Reactions    Amoxicillin-Pot Clavulanate Hives    Penicillins        Objective     Blood pressure 138/82, pulse 86, temperature 98 4 °F (36 9 °C), temperature source Tympanic, resp  rate 18, height 5' 6" (1 676 m), weight 93 3 kg (205 lb 9 6 oz)  [unfilled]    PHYSICAL EXAM     GEN: well nourished, well developed, no acute distress  HEENT: anicteric, MMM, no cervical or supraclavicular lymphadenopathy  CV: RRR, no m/r/g  CHEST: CTA b/l, no WRR  ABD: +BS, soft, NT/ND, no hepatosplenomegaly  EXT: no c/c/e  SKIN: no rashes,  NEURO: aaox3    Lab Results:   No visits with results within 1 Day(s) from this visit     Latest known visit with results is:   Appointment on 07/02/2018   Component Date Value    WBC 07/02/2018 7 45     RBC 07/02/2018 4 12     Hemoglobin 07/02/2018 12 8     Hematocrit 07/02/2018 38 6     MCV 07/02/2018 94     MCH 07/02/2018 31 1     MCHC 07/02/2018 33 2     RDW 07/02/2018 13 3     MPV 07/02/2018 9 4     Platelets 49/37/6413 274     Neutrophils Relative 07/02/2018 51     Lymphocytes Relative 07/02/2018 36     Monocytes Relative 07/02/2018 9     Eosinophils Relative 07/02/2018 4     Basophils Relative 07/02/2018 1     Neutrophils Absolute 07/02/2018 3 80     Lymphocytes Absolute 07/02/2018 2 66     Monocytes Absolute 07/02/2018 0 67     Eosinophils Absolute 07/02/2018 0 27     Basophils Absolute 07/02/2018 0 05     Sodium 07/02/2018 139     Potassium 07/02/2018 4 3     Chloride 07/02/2018 103     CO2 07/02/2018 28     ANION GAP 07/02/2018 8     BUN 07/02/2018 15     Creatinine 07/02/2018 1 00     Glucose, Fasting 07/02/2018 94     Calcium 07/02/2018 9 5     AST 07/02/2018 15     ALT 07/02/2018 29     Alkaline Phosphatase 07/02/2018 30*    Total Protein 07/02/2018 7 6     Albumin 07/02/2018 3 9     Total Bilirubin 07/02/2018 0 20     eGFR 07/02/2018 65     Cholesterol 07/02/2018 232*    Triglycerides 07/02/2018 207*    HDL, Direct 07/02/2018 45     LDL Calculated 07/02/2018 146*    Non-HDL-Chol (CHOL-HDL) 07/02/2018 187      Imaging Studies: I have personally reviewed pertinent films in PACS

## 2018-11-26 NOTE — LETTER
November 26, 2018     Samara Bustamante MD  [address]  [facility] [id number]    Patient: Ciera Mcconnell   YOB: 1965   Date of Visit: 11/26/2018       Dear Dr Reggie Mckeon: Thank you for referring Kole Vazquez to me for evaluation  Below are my notes for this consultation  If you have questions, please do not hesitate to call me  I look forward to following your patient along with you  Sincerely,        Renay Loving MD        CC: No Recipients  Renay Loving MD  11/26/2018  7:16 PM  Sign at close encounter  Consultation - [address] [facility]  Ciera Mcconnell 48 y o  female MRN: [id number]  Unit/Bed#:  Encounter: [id number]        Consults    ASSESSMENT/PLAN:   1  Alternating predominant diarrhea with mild constipation for the past 2 weeks with small amounts of blood change stool-suspect infectious colitis versus inflammatory bowel disease versus less likely post infectious IBS  -will check stool studies including ova and parasites, C diff and stool cultures   -check celiac serologies   -check inflammatory markers including ESR and CRP  -obtain records from Urgent Care regarding CBC and CMP which was done several days ago  2   Colon cancer screening-average risk, no prior colonoscopy, will schedule screening colonoscopy at this time  ______________________________________________________________________    Reason for Consult / Principal Problem: [unfilled]    HPI: Ciera Mcconnell is a 48y o  year old female who presents for evaluation of alternating diarrhea and constipation for the past several weeks  Patient states that she has had alternating diarrhea and constipation for many years however over the past 2 weeks she seems to be having acute episodes of cramping abdominal pain followed by diarrhea followed by several days of constipation   She states that symptoms occur almost 1 hr after eating, most recently have had blood and mucus in the stool  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, 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 travel or antibiotic use  She went to the urgent care center several days ago and was told that she had mild leukocytosis  I do not have these labs  No stool studies were done  Patient states that she has been on bland diet but despite this, she continues to have cramping and diarrhea sometimes with small amounts of blood  She denies any heartburn symptoms, nausea, vomiting, hematemesis or melena  She denies any family history of GI malignancy, no previous colonoscopy  Review of Systems: The remainder of the review of systems was negative except for the pertinent positives noted in HPI  Historical Information   Past Medical History:   Diagnosis Date    Ankle fracture     Asthma     Cervical spinal stenosis     Closed left arm fracture, sequela     Concussion     Degeneration, intervertebral disc, cervical     Hypertension     Kidney stone     Migraine     Right arm fracture     Seasonal allergies     Shingles     Vitamin D deficiency      Past Surgical History:   Procedure Laterality Date    CARPAL TUNNEL RELEASE Bilateral     CATARACT EXTRACTION Left     MOUTH SURGERY      SINUS SURGERY      TUBAL LIGATION       Social History   History   Alcohol Use    Yes     Comment: social     History   Drug Use No     History   Smoking Status    Never Smoker   Smokeless Tobacco    Never Used     Family History   Problem Relation Age of Onset    Heart disease Father        Meds/Allergies       (Not in a hospital admission)  No current facility-administered medications for this visit  Allergies   Allergen Reactions    Amoxicillin-Pot Clavulanate Hives    Penicillins        Objective     Blood pressure 138/82, pulse 86, temperature 98 4 °F (36 9 °C), temperature source Tympanic, resp  rate 18, height 5' 6" (1 676 m), weight 93 3 kg (205 lb 9 6 oz)      [unfilled]    PHYSICAL EXAM GEN: well nourished, well developed, no acute distress  HEENT: anicteric, MMM, no cervical or supraclavicular lymphadenopathy  CV: RRR, no m/r/g  CHEST: CTA b/l, no WRR  ABD: +BS, soft, NT/ND, no hepatosplenomegaly  EXT: no c/c/e  SKIN: no rashes,  NEURO: aaox3    Lab Results:   No visits with results within 1 Day(s) from this visit     Latest known visit with results is:   Appointment on 07/02/2018   Component Date Value    WBC 07/02/2018 7 45     RBC 07/02/2018 4 12     Hemoglobin 07/02/2018 12 8     Hematocrit 07/02/2018 38 6     MCV 07/02/2018 94     MCH 07/02/2018 31 1     MCHC 07/02/2018 33 2     RDW 07/02/2018 13 3     MPV 07/02/2018 9 4     Platelets 18/19/9669 274     Neutrophils Relative 07/02/2018 51     Lymphocytes Relative 07/02/2018 36     Monocytes Relative 07/02/2018 9     Eosinophils Relative 07/02/2018 4     Basophils Relative 07/02/2018 1     Neutrophils Absolute 07/02/2018 3 80     Lymphocytes Absolute 07/02/2018 2 66     Monocytes Absolute 07/02/2018 0 67     Eosinophils Absolute 07/02/2018 0 27     Basophils Absolute 07/02/2018 0 05     Sodium 07/02/2018 139     Potassium 07/02/2018 4 3     Chloride 07/02/2018 103     CO2 07/02/2018 28     ANION GAP 07/02/2018 8     BUN 07/02/2018 15     Creatinine 07/02/2018 1 00     Glucose, Fasting 07/02/2018 94     Calcium 07/02/2018 9 5     AST 07/02/2018 15     ALT 07/02/2018 29     Alkaline Phosphatase 07/02/2018 30*    Total Protein 07/02/2018 7 6     Albumin 07/02/2018 3 9     Total Bilirubin 07/02/2018 0 20     eGFR 07/02/2018 65     Cholesterol 07/02/2018 232*    Triglycerides 07/02/2018 207*    HDL, Direct 07/02/2018 45     LDL Calculated 07/02/2018 146*    Non-HDL-Chol (CHOL-HDL) 07/02/2018 187      Imaging Studies: I have personally reviewed pertinent films in PACS

## 2018-11-27 LAB
ENDOMYSIUM IGA SER QL: NEGATIVE
GLIADIN PEPTIDE IGA SER-ACNC: 3 UNITS (ref 0–19)
GLIADIN PEPTIDE IGG SER-ACNC: 2 UNITS (ref 0–19)
IGA SERPL-MCNC: 161 MG/DL (ref 87–352)
TTG IGA SER-ACNC: <2 U/ML (ref 0–3)
TTG IGG SER-ACNC: <2 U/ML (ref 0–5)

## 2018-11-28 ENCOUNTER — TELEPHONE (OUTPATIENT)
Dept: GASTROENTEROLOGY | Facility: AMBULARY SURGERY CENTER | Age: 53
End: 2018-11-28

## 2018-11-28 NOTE — TELEPHONE ENCOUNTER
----- Message from Edgardo Christine MD sent at 11/28/2018  9:10 AM EST -----  Please inform the patient that the celiac serologies are normal

## 2018-11-28 NOTE — LETTER
November 28, 2018     [address] [place] [id number]      Dear Ms Ponce:    We have attempted to reach you regarding your results with no response  We ask that you please contact our office upon receipt of this letter to receive your results  Thank you in advance for your cooperation and assistance          Sincerely,   [facility] Staff  [phone]

## 2018-12-26 ENCOUNTER — ANESTHESIA EVENT (OUTPATIENT)
Dept: GASTROENTEROLOGY | Facility: HOSPITAL | Age: 53
End: 2018-12-26
Payer: COMMERCIAL

## 2018-12-26 NOTE — ANESTHESIA PREPROCEDURE EVALUATION
Review of Systems/Medical History    Chart reviewed  No history of anesthetic complications     Cardiovascular  Hypertension controlled,    Pulmonary  Asthma ,        GI/Hepatic    Bowel prep  Comment: Diarrhea  Colon CA screening     Kidney stones,        Endo/Other    Obesity (BMI 32)    GYN  Negative gynecology ROS          Hematology  Negative hematology ROS      Musculoskeletal  Back pain , cervical pain,   Arthritis     Neurology    Headaches,    Psychology   Negative psychology ROS              Physical Exam    Airway    Mallampati score: II  TM Distance: >3 FB  Neck ROM: full     Dental   No notable dental hx     Cardiovascular      Pulmonary      Other Findings        Anesthesia Plan  ASA Score- 2     Anesthesia Type- IV sedation with anesthesia with ASA Monitors  Additional Monitors:   Airway Plan:         Plan Factors-    Induction- intravenous  Postoperative Plan-     Informed Consent- Anesthetic plan and risks discussed with patient  I personally reviewed this patient with the CRNA  Discussed and agreed on the Anesthesia Plan with the CRNA  José Miguel Puga

## 2018-12-27 ENCOUNTER — ANESTHESIA (OUTPATIENT)
Dept: GASTROENTEROLOGY | Facility: HOSPITAL | Age: 53
End: 2018-12-27
Payer: COMMERCIAL

## 2018-12-27 ENCOUNTER — HOSPITAL ENCOUNTER (OUTPATIENT)
Facility: HOSPITAL | Age: 53
Setting detail: OUTPATIENT SURGERY
Discharge: HOME | End: 2018-12-27
Attending: INTERNAL MEDICINE | Admitting: INTERNAL MEDICINE
Payer: COMMERCIAL

## 2018-12-27 VITALS
HEART RATE: 76 BPM | DIASTOLIC BLOOD PRESSURE: 81 MMHG | BODY MASS INDEX: 32.3 KG/M2 | RESPIRATION RATE: 18 BRPM | OXYGEN SATURATION: 99 % | SYSTOLIC BLOOD PRESSURE: 130 MMHG | TEMPERATURE: 96.8 F | WEIGHT: 201 LBS | HEIGHT: 66 IN

## 2018-12-27 DIAGNOSIS — Z12.11 COLON CANCER SCREENING: ICD-10-CM

## 2018-12-27 DIAGNOSIS — R19.7 DIARRHEA, UNSPECIFIED TYPE: ICD-10-CM

## 2018-12-27 PROCEDURE — 88305 TISSUE EXAM BY PATHOLOGIST: CPT | Performed by: PATHOLOGY

## 2018-12-27 PROCEDURE — 45380 COLONOSCOPY AND BIOPSY: CPT | Performed by: INTERNAL MEDICINE

## 2018-12-27 RX ORDER — PROPOFOL 10 MG/ML
INJECTION, EMULSION INTRAVENOUS AS NEEDED
Status: DISCONTINUED | OUTPATIENT
Start: 2018-12-27 | End: 2018-12-27 | Stop reason: SURG

## 2018-12-27 RX ORDER — SODIUM CHLORIDE 9 MG/ML
INJECTION, SOLUTION INTRAVENOUS CONTINUOUS PRN
Status: DISCONTINUED | OUTPATIENT
Start: 2018-12-27 | End: 2018-12-27 | Stop reason: SURG

## 2018-12-27 RX ORDER — SODIUM CHLORIDE 9 MG/ML
125 INJECTION, SOLUTION INTRAVENOUS CONTINUOUS
Status: DISCONTINUED | OUTPATIENT
Start: 2018-12-27 | End: 2018-12-27 | Stop reason: HOSPADM

## 2018-12-27 RX ADMIN — PROPOFOL 50 MG: 10 INJECTION, EMULSION INTRAVENOUS at 11:32

## 2018-12-27 RX ADMIN — PROPOFOL 50 MG: 10 INJECTION, EMULSION INTRAVENOUS at 11:35

## 2018-12-27 RX ADMIN — SODIUM CHLORIDE: 0.9 INJECTION, SOLUTION INTRAVENOUS at 11:23

## 2018-12-27 RX ADMIN — PROPOFOL 50 MG: 10 INJECTION, EMULSION INTRAVENOUS at 11:38

## 2018-12-27 RX ADMIN — PROPOFOL 50 MG: 10 INJECTION, EMULSION INTRAVENOUS at 11:29

## 2018-12-27 RX ADMIN — PROPOFOL 50 MG: 10 INJECTION, EMULSION INTRAVENOUS at 11:34

## 2018-12-27 RX ADMIN — PROPOFOL 50 MG: 10 INJECTION, EMULSION INTRAVENOUS at 11:36

## 2018-12-27 RX ADMIN — PROPOFOL 50 MG: 10 INJECTION, EMULSION INTRAVENOUS at 11:30

## 2018-12-27 RX ADMIN — PROPOFOL 50 MG: 10 INJECTION, EMULSION INTRAVENOUS at 11:39

## 2018-12-27 RX ADMIN — PROPOFOL 100 MG: 10 INJECTION, EMULSION INTRAVENOUS at 11:27

## 2018-12-27 NOTE — DISCHARGE INSTR - AVS FIRST PAGE
Procedure(s) (LRB):  COLONOSCOPY (N/A)    Specimen(s):  ID Type Source Tests Collected by Time Destination   1 : Random Cold Bx Colon Tissue Colon TISSUE EXAM Dano Apple MD 12/27/2018 11:33 AM    2 : Cold Bx Sigmoid Colon polyp x3 Tissue Large Intestine, Sigmoid Colon TISSUE EXAM Dano Apple MD 12/27/2018 11:37 AM        Estimated Blood Loss:   Minimal    Colonoscopy Procedure Note    Procedure: Colonoscopy    Sedation: Monitored anesthesia care, check anesthesia records      ASA Class: 2    INDICATIONS:  Colon cancer screening, change in bowel habits  POST-OP DIAGNOSIS: See the impression below    Procedure Details     Prior colonoscopy: No prior colonoscopy  Informed consent was obtained for the procedure, including sedation  Risks of perforation, hemorrhage, adverse drug reaction and aspiration were discussed  The patient was placed in the left lateral decubitus position  Based on the pre-procedure assessment, including review of the patient's medical history, medications, allergies, and review of systems, she had been deemed to be an appropriate candidate for conscious sedation; she was therefore sedated with the medications listed below  The patient was monitored continuously with telemetry, pulse oximetry, blood pressure monitoring, and direct observations  A rectal examination was performed  The variable-stiffness pediatric colonoscope was inserted into the rectum and advanced under direct vision to the terminal ileum  The quality of the colonic preparation was good  A careful inspection was made as the colonoscope was withdrawn, including a retroflexed view of the rectum; findings and interventions are described below  Findings:  1  Three flat polyps noted in the sigmoid measuring between 1-3 mm, removed using cold biopsy forceps    2  Remainder of the colonic mucosa appeared unremarkable, biopsies were taken nonetheless from the ascending, transverse and descending colon to assess for microscopic colitis  3  TI was intubated and appeared normal within the distal 10 cm  4  Retroflexed view was notable for small internal hemorrhoids  Complications: None; patient tolerated the procedure well  Impression:    1  Three colon polyps  2  Small internal hemorrhoids  Recommendations:  Repeat colonoscopy in 5 years if polyps are adenomas  Low FODMAP diet  Follow-up biopsy results in 2-3 weeks

## 2018-12-27 NOTE — OP NOTE
Procedure(s) (LRB):  COLONOSCOPY (N/A)    Specimen(s):  ID Type Source Tests Collected by Time Destination   1 : Random Cold Bx Colon Tissue Colon TISSUE EXAM Kortney Mata MD 12/27/2018 11:33 AM    2 : Cold Bx Sigmoid Colon polyp x3 Tissue Large Intestine, Sigmoid Colon TISSUE EXAM Kortney Mata MD 12/27/2018 11:37 AM        Estimated Blood Loss:   Minimal    Colonoscopy Procedure Note    Procedure: Colonoscopy    Sedation: Monitored anesthesia care, check anesthesia records      ASA Class: 2    INDICATIONS:  Colon cancer screening, change in bowel habits  POST-OP DIAGNOSIS: See the impression below    Procedure Details     Prior colonoscopy: No prior colonoscopy  Informed consent was obtained for the procedure, including sedation  Risks of perforation, hemorrhage, adverse drug reaction and aspiration were discussed  The patient was placed in the left lateral decubitus position  Based on the pre-procedure assessment, including review of the patient's medical history, medications, allergies, and review of systems, she had been deemed to be an appropriate candidate for conscious sedation; she was therefore sedated with the medications listed below  The patient was monitored continuously with telemetry, pulse oximetry, blood pressure monitoring, and direct observations  A rectal examination was performed  The variable-stiffness pediatric colonoscope was inserted into the rectum and advanced under direct vision to the terminal ileum  The quality of the colonic preparation was good  A careful inspection was made as the colonoscope was withdrawn, including a retroflexed view of the rectum; findings and interventions are described below  Findings:  1  Three flat polyps noted in the sigmoid measuring between 1-3 mm, removed using cold biopsy forceps    2  Remainder of the colonic mucosa appeared unremarkable, biopsies were taken nonetheless from the ascending, transverse and descending colon to assess for microscopic colitis  3  TI was intubated and appeared normal within the distal 10 cm  4  Retroflexed view was notable for small internal hemorrhoids  Complications: None; patient tolerated the procedure well  Impression:    1  Three colon polyps  2  Small internal hemorrhoids  Recommendations:  Repeat colonoscopy in 5 years if polyps are adenomas  Low FODMAP diet  Follow-up biopsy results in 2-3 weeks

## 2018-12-27 NOTE — ANESTHESIA POSTPROCEDURE EVALUATION
Post-Op Assessment Note      CV Status:  Stable    Mental Status:  Alert and awake    Hydration Status:  Euvolemic    PONV Controlled:  Controlled    Airway Patency:  Patent    Post Op Vitals Reviewed: Yes          Staff: CRNA, Anesthesiologist           /62 (12/27/18 1149)    Temp (!) 96 9 °F (36 1 °C) (12/27/18 1149)    Pulse 87 (12/27/18 1149)   Resp 16 (12/27/18 1149)    SpO2 94 % (12/27/18 1149)

## 2018-12-27 NOTE — H&P
History and Physical - [facility]  General Rocha 48 y o  female MRN: [id number]    HPI: General Rocha is a 48y o  year old female who presents for evaluation of change in bowel habits and colon cancer screening        Review of Systems    Historical Information   Past Medical History:   Diagnosis Date    Ankle fracture     Asthma     Cervical spinal stenosis     Closed left arm fracture, sequela     Concussion     Degeneration, intervertebral disc, cervical     Hypertension     Kidney stone     Migraine     Right arm fracture     Seasonal allergies     Shingles     Vitamin D deficiency      Past Surgical History:   Procedure Laterality Date    CARPAL TUNNEL RELEASE Bilateral     CATARACT EXTRACTION Left     MOUTH SURGERY      SINUS SURGERY      TUBAL LIGATION       Social History   History   Alcohol Use    Yes     Comment: social     History   Drug Use No     History   Smoking Status    Never Smoker   Smokeless Tobacco    Never Used     Family History   Problem Relation Age of Onset    Heart disease Father        Meds/Allergies     Prescriptions Prior to Admission   Medication    Ascorbic Acid (VITAMIN C) 100 MG tablet    DULoxetine (CYMBALTA) 30 mg delayed release capsule    ergocalciferol (VITAMIN D2) 50,000 units    fexofenadine (ALLEGRA) 180 MG tablet    fluticasone (FLONASE) 50 mcg/act nasal spray    lisinopril (ZESTRIL) 10 mg tablet    melatonin 3 mg    meloxicam (MOBIC) 15 mg tablet    methocarbamol (ROBAXIN) 750 mg tablet    Multiple Vitamin (MULTIVITAMIN) capsule    Omega-3 1000 MG CAPS    ranitidine (ZANTAC) 75 MG tablet    albuterol (PROVENTIL HFA,VENTOLIN HFA) 90 mcg/act inhaler    dicyclomine (BENTYL) 10 mg capsule    Na Sulfate-K Sulfate-Mg Sulf 17 5-3 13-1 6 GM/177ML SOLN    Tranexamic Acid 650 MG TABS       Allergies   Allergen Reactions    Amoxicillin-Pot Clavulanate Hives    Penicillins        Objective     Blood pressure 124/81, pulse 85, temperature (!) 97 1 °F (36 2 °C), temperature source Temporal, resp  rate 18, height 5' 6" (1 676 m), weight 91 2 kg (201 lb), SpO2 97 %  PHYSICAL EXAM    Gen: NAD  CV: RRR  CHEST: Clear  ABD: soft, NT/ND  EXT: no edema  Neuro: AAO      ASSESSMENT/PLAN:  This is a 48y o  year old female here for evaluation of change in bowel habits and colon cancer screening  PLAN:   Procedure:  Colonoscopy

## 2019-01-08 ENCOUNTER — TELEPHONE (OUTPATIENT)
Dept: GASTROENTEROLOGY | Facility: CLINIC | Age: 54
End: 2019-01-08

## 2019-01-08 NOTE — TELEPHONE ENCOUNTER
----- Message from Prem Bernabe MD sent at 1/8/2019 12:34 PM EST -----  Please inform the patient that the polyps removed were hyperplastic, would recommend repeat colonoscopy in 5 years  There was no evidence of microscopic colitis

## 2019-01-08 NOTE — LETTER
January 8, 2019     [address] [id number]      Dear Ms Ponce:    We have attempted to reach you regarding your results  We ask that you please contact our office upon receipt of this letter to receive your results  Thank you in advance for your cooperation and assistance          Sincerely,   [facility] Staff  [phone]

## 2019-08-14 ENCOUNTER — HOSPITAL ENCOUNTER (OUTPATIENT)
Dept: RADIOLOGY | Age: 54
Discharge: HOME | End: 2019-08-14
Payer: COMMERCIAL

## 2019-08-14 VITALS — HEIGHT: 67 IN | WEIGHT: 215 LBS | BODY MASS INDEX: 33.74 KG/M2

## 2019-08-14 DIAGNOSIS — Z12.39 BREAST CANCER SCREENING: ICD-10-CM

## 2019-08-14 PROCEDURE — 77067 SCR MAMMO BI INCL CAD: CPT

## 2019-08-14 PROCEDURE — 77063 BREAST TOMOSYNTHESIS BI: CPT

## 2019-09-23 ENCOUNTER — ANNUAL EXAM (OUTPATIENT)
Dept: OBGYN CLINIC | Facility: CLINIC | Age: 54
End: 2019-09-23
Payer: COMMERCIAL

## 2019-09-23 VITALS
HEIGHT: 67 IN | WEIGHT: 216.6 LBS | SYSTOLIC BLOOD PRESSURE: 142 MMHG | BODY MASS INDEX: 34 KG/M2 | DIASTOLIC BLOOD PRESSURE: 96 MMHG

## 2019-09-23 DIAGNOSIS — Z12.39 BREAST CANCER SCREENING: Primary | ICD-10-CM

## 2019-09-23 DIAGNOSIS — Z01.419 WOMEN'S ANNUAL ROUTINE GYNECOLOGICAL EXAMINATION: ICD-10-CM

## 2019-09-23 PROCEDURE — 99396 PREV VISIT EST AGE 40-64: CPT | Performed by: OBSTETRICS & GYNECOLOGY

## 2019-09-23 RX ORDER — GLUCOSAMINE HCL 500 MG
3000 TABLET ORAL DAILY
COMMUNITY

## 2019-09-23 NOTE — PROGRESS NOTES
Assessment/Plan:    The patient was informed of a stable claudia menopausal gyn examination  A Pap smear was not performed  The pelvic exam was completely benign  She will continue to get yearly mammograms  She tried lose more weight  Return to office in 1 year  If the menopause symptoms worsen she will contact me as soon as possible  Subjective:      Patient ID: Sobia Urena is a 47 y o  female  HPI    This is a 63-year-old white female, she is a  3 para 3 with 3 prior vaginal deliveries  She had a tubal ligation for contraception  She is still sexually active  Her last menstrual cycles approximately 6 months ago  She also admits to hot flashes and occasional night sweats  She also admits to slight stress urine incontinence  This is not interfering with her lifestyle  She denies any problem with depression or anxiety  She denies any new major family illnesses  She is currently being treated for hypertension fibromyalgia anxiety and GERD  She has a dentist on a regular basis  Her weight is stable  She does take medication for anxiety/depression    The following portions of the patient's history were reviewed and updated as appropriate: allergies, current medications, past family history, past medical history, past social history, past surgical history and problem list     Review of Systems   Genitourinary:        Positive for slight stress urine incontinence         Objective:      /96   Ht 5' 6 5" (1 689 m)   Wt 98 2 kg (216 lb 9 6 oz)   LMP 03/15/2019 (Exact Date)   BMI 34 44 kg/m²          Physical Exam   Constitutional: She is oriented to person, place, and time  She appears well-developed and well-nourished  HENT:   Head: Normocephalic and atraumatic  Eyes: EOM are normal    Neck: Normal range of motion  Neck supple  Cardiovascular: Normal rate, regular rhythm and normal heart sounds  Pulmonary/Chest: No stridor  No respiratory distress  She has no wheezes  She has no rales  She exhibits no tenderness  Right breast exhibits no inverted nipple, no mass, no nipple discharge, no skin change and no tenderness  Left breast exhibits no inverted nipple, no mass, no nipple discharge, no skin change and no tenderness  No breast swelling, tenderness, discharge or bleeding  Breasts are symmetrical    Abdominal: Soft  Bowel sounds are normal  She exhibits no distension and no mass  There is no tenderness  There is no rebound and no guarding  No hernia  Hernia confirmed negative in the right inguinal area and confirmed negative in the left inguinal area  Genitourinary: Rectum normal, vagina normal and uterus normal  No labial fusion  There is no rash, tenderness, lesion or injury on the right labia  There is no rash, tenderness, lesion or injury on the left labia  Uterus is not deviated, not enlarged, not fixed and not tender  Cervix exhibits no motion tenderness, no discharge and no friability  Right adnexum displays no mass, no tenderness and no fullness  Left adnexum displays no mass, no tenderness and no fullness  No erythema, tenderness or bleeding in the vagina  No foreign body in the vagina  No signs of injury around the vagina  No vaginal discharge found  Genitourinary Comments: Pap smear was not performed  There is no cervical motion tenderness there is no adnexal pain or discomfort  There is no prolapse   Musculoskeletal: Normal range of motion  Lymphadenopathy: No inguinal adenopathy noted on the right or left side  Neurological: She is alert and oriented to person, place, and time  Skin: Skin is warm and dry  Psychiatric: She has a normal mood and affect

## 2019-09-23 NOTE — PATIENT INSTRUCTIONS
The patient was informed of a stable claudia menopausal gyn examination  A Pap smear was not performed  She will continue to get yearly mammograms  Return my office in 1 year  Menopause symptoms worsen she will contact me as soon as possible

## 2019-11-29 ENCOUNTER — APPOINTMENT (OUTPATIENT)
Dept: LAB | Facility: MEDICAL CENTER | Age: 54
End: 2019-11-29
Payer: COMMERCIAL

## 2019-11-29 ENCOUNTER — TRANSCRIBE ORDERS (OUTPATIENT)
Dept: ADMINISTRATIVE | Facility: HOSPITAL | Age: 54
End: 2019-11-29

## 2019-11-29 DIAGNOSIS — Z51.81 ENCOUNTER FOR THERAPEUTIC DRUG MONITORING: ICD-10-CM

## 2019-11-29 DIAGNOSIS — Z79.1 ENCOUNTER FOR LONG-TERM (CURRENT) USE OF NON-STEROIDAL ANTI-INFLAMMATORIES: ICD-10-CM

## 2019-11-29 DIAGNOSIS — Z51.81 ENCOUNTER FOR THERAPEUTIC DRUG MONITORING: Primary | ICD-10-CM

## 2019-11-29 LAB
ALBUMIN SERPL BCP-MCNC: 3.9 G/DL (ref 3.5–5)
ALP SERPL-CCNC: 40 U/L (ref 46–116)
ALT SERPL W P-5'-P-CCNC: 52 U/L (ref 12–78)
ANION GAP SERPL CALCULATED.3IONS-SCNC: 3 MMOL/L (ref 4–13)
AST SERPL W P-5'-P-CCNC: 25 U/L (ref 5–45)
BACTERIA UR QL AUTO: NORMAL /HPF
BASOPHILS # BLD AUTO: 0.04 THOUSANDS/ΜL (ref 0–0.1)
BASOPHILS NFR BLD AUTO: 1 % (ref 0–1)
BILIRUB SERPL-MCNC: 0.31 MG/DL (ref 0.2–1)
BILIRUB UR QL STRIP: NEGATIVE
BUN SERPL-MCNC: 19 MG/DL (ref 5–25)
CALCIUM SERPL-MCNC: 9.4 MG/DL (ref 8.3–10.1)
CHLORIDE SERPL-SCNC: 107 MMOL/L (ref 100–108)
CLARITY UR: CLEAR
CO2 SERPL-SCNC: 29 MMOL/L (ref 21–32)
COLOR UR: YELLOW
CREAT SERPL-MCNC: 0.92 MG/DL (ref 0.6–1.3)
EOSINOPHIL # BLD AUTO: 0.15 THOUSAND/ΜL (ref 0–0.61)
EOSINOPHIL NFR BLD AUTO: 3 % (ref 0–6)
ERYTHROCYTE [DISTWIDTH] IN BLOOD BY AUTOMATED COUNT: 12.8 % (ref 11.6–15.1)
GFR SERPL CREATININE-BSD FRML MDRD: 71 ML/MIN/1.73SQ M
GLUCOSE P FAST SERPL-MCNC: 97 MG/DL (ref 65–99)
GLUCOSE UR STRIP-MCNC: NEGATIVE MG/DL
HCT VFR BLD AUTO: 39.1 % (ref 34.8–46.1)
HGB BLD-MCNC: 12.3 G/DL (ref 11.5–15.4)
HGB UR QL STRIP.AUTO: NEGATIVE
HYALINE CASTS #/AREA URNS LPF: NORMAL /LPF
IMM GRANULOCYTES # BLD AUTO: 0 THOUSAND/UL (ref 0–0.2)
IMM GRANULOCYTES NFR BLD AUTO: 0 % (ref 0–2)
KETONES UR STRIP-MCNC: NEGATIVE MG/DL
LEUKOCYTE ESTERASE UR QL STRIP: NEGATIVE
LYMPHOCYTES # BLD AUTO: 1.78 THOUSANDS/ΜL (ref 0.6–4.47)
LYMPHOCYTES NFR BLD AUTO: 37 % (ref 14–44)
MCH RBC QN AUTO: 29.9 PG (ref 26.8–34.3)
MCHC RBC AUTO-ENTMCNC: 31.5 G/DL (ref 31.4–37.4)
MCV RBC AUTO: 95 FL (ref 82–98)
MONOCYTES # BLD AUTO: 0.47 THOUSAND/ΜL (ref 0.17–1.22)
MONOCYTES NFR BLD AUTO: 10 % (ref 4–12)
NEUTROPHILS # BLD AUTO: 2.36 THOUSANDS/ΜL (ref 1.85–7.62)
NEUTS SEG NFR BLD AUTO: 49 % (ref 43–75)
NITRITE UR QL STRIP: NEGATIVE
NON-SQ EPI CELLS URNS QL MICRO: NORMAL /HPF
NRBC BLD AUTO-RTO: 0 /100 WBCS
PH UR STRIP.AUTO: 7 [PH]
PLATELET # BLD AUTO: 262 THOUSANDS/UL (ref 149–390)
PMV BLD AUTO: 9.9 FL (ref 8.9–12.7)
POTASSIUM SERPL-SCNC: 4.5 MMOL/L (ref 3.5–5.3)
PROT SERPL-MCNC: 7.4 G/DL (ref 6.4–8.2)
PROT UR STRIP-MCNC: NEGATIVE MG/DL
RBC # BLD AUTO: 4.11 MILLION/UL (ref 3.81–5.12)
RBC #/AREA URNS AUTO: NORMAL /HPF
SODIUM SERPL-SCNC: 139 MMOL/L (ref 136–145)
SP GR UR STRIP.AUTO: 1.02 (ref 1–1.03)
UROBILINOGEN UR QL STRIP.AUTO: 0.2 E.U./DL
WBC # BLD AUTO: 4.8 THOUSAND/UL (ref 4.31–10.16)
WBC #/AREA URNS AUTO: NORMAL /HPF

## 2019-11-29 PROCEDURE — 36415 COLL VENOUS BLD VENIPUNCTURE: CPT

## 2019-11-29 PROCEDURE — 81001 URINALYSIS AUTO W/SCOPE: CPT | Performed by: INTERNAL MEDICINE

## 2019-11-29 PROCEDURE — 85025 COMPLETE CBC W/AUTO DIFF WBC: CPT

## 2019-11-29 PROCEDURE — 80053 COMPREHEN METABOLIC PANEL: CPT

## 2020-05-21 ENCOUNTER — NURSE TRIAGE (OUTPATIENT)
Dept: OTHER | Facility: OTHER | Age: 55
End: 2020-05-21

## 2020-05-22 ENCOUNTER — OFFICE VISIT (OUTPATIENT)
Dept: URGENT CARE | Facility: MEDICAL CENTER | Age: 55
End: 2020-05-22
Payer: COMMERCIAL

## 2020-05-22 VITALS — HEART RATE: 86 BPM | OXYGEN SATURATION: 96 % | TEMPERATURE: 98.9 F | RESPIRATION RATE: 18 BRPM

## 2020-05-22 DIAGNOSIS — R05.9 COUGH: Primary | ICD-10-CM

## 2020-05-22 PROCEDURE — G0382 LEV 3 HOSP TYPE B ED VISIT: HCPCS | Performed by: PHYSICIAN ASSISTANT

## 2020-05-22 PROCEDURE — U0003 INFECTIOUS AGENT DETECTION BY NUCLEIC ACID (DNA OR RNA); SEVERE ACUTE RESPIRATORY SYNDROME CORONAVIRUS 2 (SARS-COV-2) (CORONAVIRUS DISEASE [COVID-19]), AMPLIFIED PROBE TECHNIQUE, MAKING USE OF HIGH THROUGHPUT TECHNOLOGIES AS DESCRIBED BY CMS-2020-01-R: HCPCS | Performed by: PHYSICIAN ASSISTANT

## 2020-05-23 LAB — SARS-COV-2 RNA SPEC QL NAA+PROBE: NOT DETECTED

## 2020-05-27 ENCOUNTER — TELEPHONE (OUTPATIENT)
Dept: URGENT CARE | Facility: MEDICAL CENTER | Age: 55
End: 2020-05-27

## 2020-09-08 ENCOUNTER — TRANSCRIBE ORDERS (OUTPATIENT)
Dept: ADMINISTRATIVE | Facility: HOSPITAL | Age: 55
End: 2020-09-08

## 2020-09-08 DIAGNOSIS — Z12.31 SCREENING MAMMOGRAM, ENCOUNTER FOR: Primary | ICD-10-CM

## 2020-10-05 ENCOUNTER — ANNUAL EXAM (OUTPATIENT)
Dept: OBGYN CLINIC | Facility: CLINIC | Age: 55
End: 2020-10-05
Payer: COMMERCIAL

## 2020-10-05 VITALS
TEMPERATURE: 98 F | WEIGHT: 225 LBS | DIASTOLIC BLOOD PRESSURE: 100 MMHG | SYSTOLIC BLOOD PRESSURE: 136 MMHG | HEIGHT: 66 IN | BODY MASS INDEX: 36.16 KG/M2

## 2020-10-05 DIAGNOSIS — Z01.419 WOMEN'S ANNUAL ROUTINE GYNECOLOGICAL EXAMINATION: Primary | ICD-10-CM

## 2020-10-05 PROCEDURE — 99396 PREV VISIT EST AGE 40-64: CPT | Performed by: OBSTETRICS & GYNECOLOGY

## 2020-10-07 LAB
CLINICAL INFO: NORMAL
CYTO CVX: NORMAL
CYTOLOGY CMNT CVX/VAG CYTO-IMP: NORMAL
DATE PREVIOUS BX: NORMAL
HPV E6+E7 MRNA CVX QL NAA+PROBE: NOT DETECTED
LMP START DATE: NORMAL
SL AMB PREV. PAP:: NORMAL
SPECIMEN SOURCE CVX/VAG CYTO: NORMAL

## 2020-11-17 ENCOUNTER — TRANSCRIBE ORDERS (OUTPATIENT)
Dept: ADMINISTRATIVE | Facility: HOSPITAL | Age: 55
End: 2020-11-17

## 2020-11-17 ENCOUNTER — HOSPITAL ENCOUNTER (OUTPATIENT)
Dept: MAMMOGRAPHY | Facility: MEDICAL CENTER | Age: 55
Discharge: HOME | End: 2020-11-17
Payer: COMMERCIAL

## 2020-11-17 ENCOUNTER — LAB (OUTPATIENT)
Dept: LAB | Facility: MEDICAL CENTER | Age: 55
End: 2020-11-17
Payer: COMMERCIAL

## 2020-11-17 ENCOUNTER — APPOINTMENT (OUTPATIENT)
Dept: LAB | Facility: CLINIC | Age: 55
End: 2020-11-17
Payer: COMMERCIAL

## 2020-11-17 VITALS — WEIGHT: 225 LBS | HEIGHT: 66 IN | BODY MASS INDEX: 36.16 KG/M2

## 2020-11-17 DIAGNOSIS — Z51.81 ENCOUNTER FOR THERAPEUTIC DRUG MONITORING: ICD-10-CM

## 2020-11-17 DIAGNOSIS — Z51.81 ENCOUNTER FOR THERAPEUTIC DRUG MONITORING: Primary | ICD-10-CM

## 2020-11-17 DIAGNOSIS — Z79.1 ENCOUNTER FOR LONG-TERM (CURRENT) USE OF NON-STEROIDAL ANTI-INFLAMMATORIES: ICD-10-CM

## 2020-11-17 DIAGNOSIS — Z12.31 SCREENING MAMMOGRAM, ENCOUNTER FOR: ICD-10-CM

## 2020-11-17 LAB
25(OH)D3 SERPL-MCNC: 55.2 NG/ML (ref 30–100)
ALBUMIN SERPL BCP-MCNC: 3.9 G/DL (ref 3.5–5)
ALP SERPL-CCNC: 46 U/L (ref 46–116)
ALT SERPL W P-5'-P-CCNC: 75 U/L (ref 12–78)
ANION GAP SERPL CALCULATED.3IONS-SCNC: 5 MMOL/L (ref 4–13)
AST SERPL W P-5'-P-CCNC: 27 U/L (ref 5–45)
BACTERIA UR QL AUTO: ABNORMAL /HPF
BASOPHILS # BLD AUTO: 0.05 THOUSANDS/ΜL (ref 0–0.1)
BASOPHILS NFR BLD AUTO: 1 % (ref 0–1)
BILIRUB SERPL-MCNC: 0.33 MG/DL (ref 0.2–1)
BILIRUB UR QL STRIP: NEGATIVE
BUN SERPL-MCNC: 21 MG/DL (ref 5–25)
CALCIUM SERPL-MCNC: 9.9 MG/DL (ref 8.3–10.1)
CAOX CRY URNS QL MICRO: ABNORMAL /HPF
CHLORIDE SERPL-SCNC: 106 MMOL/L (ref 100–108)
CK SERPL-CCNC: 63 U/L (ref 26–192)
CLARITY UR: CLEAR
CO2 SERPL-SCNC: 29 MMOL/L (ref 21–32)
COLOR UR: YELLOW
CREAT SERPL-MCNC: 0.96 MG/DL (ref 0.6–1.3)
CRP SERPL QL: 3.6 MG/L
EOSINOPHIL # BLD AUTO: 0.23 THOUSAND/ΜL (ref 0–0.61)
EOSINOPHIL NFR BLD AUTO: 4 % (ref 0–6)
ERYTHROCYTE [DISTWIDTH] IN BLOOD BY AUTOMATED COUNT: 13.1 % (ref 11.6–15.1)
ERYTHROCYTE [SEDIMENTATION RATE] IN BLOOD: 11 MM/HOUR (ref 0–29)
GFR SERPL CREATININE-BSD FRML MDRD: 67 ML/MIN/1.73SQ M
GLUCOSE P FAST SERPL-MCNC: 105 MG/DL (ref 65–99)
GLUCOSE UR STRIP-MCNC: NEGATIVE MG/DL
HCT VFR BLD AUTO: 39.7 % (ref 34.8–46.1)
HGB BLD-MCNC: 12.6 G/DL (ref 11.5–15.4)
HGB UR QL STRIP.AUTO: NEGATIVE
IMM GRANULOCYTES # BLD AUTO: 0.01 THOUSAND/UL (ref 0–0.2)
IMM GRANULOCYTES NFR BLD AUTO: 0 % (ref 0–2)
KETONES UR STRIP-MCNC: NEGATIVE MG/DL
LEUKOCYTE ESTERASE UR QL STRIP: NEGATIVE
LYMPHOCYTES # BLD AUTO: 1.93 THOUSANDS/ΜL (ref 0.6–4.47)
LYMPHOCYTES NFR BLD AUTO: 34 % (ref 14–44)
MCH RBC QN AUTO: 30.3 PG (ref 26.8–34.3)
MCHC RBC AUTO-ENTMCNC: 31.7 G/DL (ref 31.4–37.4)
MCV RBC AUTO: 95 FL (ref 82–98)
MONOCYTES # BLD AUTO: 0.71 THOUSAND/ΜL (ref 0.17–1.22)
MONOCYTES NFR BLD AUTO: 13 % (ref 4–12)
MUCOUS THREADS UR QL AUTO: ABNORMAL
NEUTROPHILS # BLD AUTO: 2.68 THOUSANDS/ΜL (ref 1.85–7.62)
NEUTS SEG NFR BLD AUTO: 48 % (ref 43–75)
NITRITE UR QL STRIP: NEGATIVE
NON-SQ EPI CELLS URNS QL MICRO: ABNORMAL /HPF
NRBC BLD AUTO-RTO: 0 /100 WBCS
PH UR STRIP.AUTO: 6 [PH]
PLATELET # BLD AUTO: 264 THOUSANDS/UL (ref 149–390)
PMV BLD AUTO: 9.9 FL (ref 8.9–12.7)
POTASSIUM SERPL-SCNC: 4.2 MMOL/L (ref 3.5–5.3)
PROT SERPL-MCNC: 7.5 G/DL (ref 6.4–8.2)
PROT UR STRIP-MCNC: NEGATIVE MG/DL
RBC # BLD AUTO: 4.16 MILLION/UL (ref 3.81–5.12)
RBC #/AREA URNS AUTO: ABNORMAL /HPF
SODIUM SERPL-SCNC: 140 MMOL/L (ref 136–145)
SP GR UR STRIP.AUTO: >=1.03 (ref 1–1.03)
TSH SERPL DL<=0.05 MIU/L-ACNC: 1.15 UIU/ML (ref 0.36–3.74)
UROBILINOGEN UR QL STRIP.AUTO: 0.2 E.U./DL
WBC # BLD AUTO: 5.61 THOUSAND/UL (ref 4.31–10.16)
WBC #/AREA URNS AUTO: ABNORMAL /HPF

## 2020-11-17 PROCEDURE — 81001 URINALYSIS AUTO W/SCOPE: CPT | Performed by: INTERNAL MEDICINE

## 2020-11-17 PROCEDURE — 85652 RBC SED RATE AUTOMATED: CPT

## 2020-11-17 PROCEDURE — 36415 COLL VENOUS BLD VENIPUNCTURE: CPT

## 2020-11-17 PROCEDURE — 82550 ASSAY OF CK (CPK): CPT

## 2020-11-17 PROCEDURE — 84443 ASSAY THYROID STIM HORMONE: CPT

## 2020-11-17 PROCEDURE — 77067 SCR MAMMO BI INCL CAD: CPT

## 2020-11-17 PROCEDURE — 77063 BREAST TOMOSYNTHESIS BI: CPT

## 2020-11-17 PROCEDURE — 80053 COMPREHEN METABOLIC PANEL: CPT

## 2020-11-17 PROCEDURE — 82306 VITAMIN D 25 HYDROXY: CPT

## 2020-11-17 PROCEDURE — 86140 C-REACTIVE PROTEIN: CPT

## 2020-11-17 PROCEDURE — 85025 COMPLETE CBC W/AUTO DIFF WBC: CPT

## 2020-12-18 ENCOUNTER — IMMUNIZATIONS (OUTPATIENT)
Dept: FAMILY MEDICINE CLINIC | Facility: HOSPITAL | Age: 55
End: 2020-12-18
Payer: COMMERCIAL

## 2020-12-18 DIAGNOSIS — Z23 ENCOUNTER FOR IMMUNIZATION: ICD-10-CM

## 2020-12-18 PROCEDURE — 0001A SARS-COV-2 / COVID-19 MRNA VACCINE (PFIZER-BIONTECH) 30 MCG: CPT

## 2020-12-18 PROCEDURE — 91300 SARS-COV-2 / COVID-19 MRNA VACCINE (PFIZER-BIONTECH) 30 MCG: CPT

## 2020-12-24 ENCOUNTER — LAB (OUTPATIENT)
Dept: LAB | Facility: MEDICAL CENTER | Age: 55
End: 2020-12-24
Payer: COMMERCIAL

## 2020-12-24 ENCOUNTER — APPOINTMENT (OUTPATIENT)
Dept: RADIOLOGY | Facility: MEDICAL CENTER | Age: 55
End: 2020-12-24
Payer: COMMERCIAL

## 2020-12-24 ENCOUNTER — TRANSCRIBE ORDERS (OUTPATIENT)
Dept: ADMINISTRATIVE | Facility: HOSPITAL | Age: 55
End: 2020-12-24

## 2020-12-24 DIAGNOSIS — Z11.59 SCREENING EXAMINATION FOR POLIOMYELITIS: ICD-10-CM

## 2020-12-24 DIAGNOSIS — R06.00 DYSPNEA, UNSPECIFIED TYPE: ICD-10-CM

## 2020-12-24 DIAGNOSIS — Z11.59 SCREENING EXAMINATION FOR POLIOMYELITIS: Primary | ICD-10-CM

## 2020-12-24 DIAGNOSIS — Z13.6 SCREENING FOR ISCHEMIC HEART DISEASE: ICD-10-CM

## 2020-12-24 LAB
CHOLEST SERPL-MCNC: 253 MG/DL (ref 50–200)
HCV AB SER QL: NORMAL
HDLC SERPL-MCNC: 46 MG/DL
LDLC SERPL CALC-MCNC: 164 MG/DL (ref 0–100)
NONHDLC SERPL-MCNC: 207 MG/DL
TRIGL SERPL-MCNC: 215 MG/DL

## 2020-12-24 PROCEDURE — 80061 LIPID PANEL: CPT

## 2020-12-24 PROCEDURE — 86803 HEPATITIS C AB TEST: CPT

## 2020-12-24 PROCEDURE — 71046 X-RAY EXAM CHEST 2 VIEWS: CPT

## 2020-12-24 PROCEDURE — 36415 COLL VENOUS BLD VENIPUNCTURE: CPT

## 2021-01-05 ENCOUNTER — TRANSCRIBE ORDERS (OUTPATIENT)
Dept: ADMINISTRATIVE | Facility: HOSPITAL | Age: 56
End: 2021-01-05

## 2021-01-08 ENCOUNTER — IMMUNIZATIONS (OUTPATIENT)
Dept: FAMILY MEDICINE CLINIC | Facility: HOSPITAL | Age: 56
End: 2021-01-08

## 2021-01-08 DIAGNOSIS — R06.00 DOE (DYSPNEA ON EXERTION): Primary | ICD-10-CM

## 2021-01-08 DIAGNOSIS — Z23 ENCOUNTER FOR IMMUNIZATION: ICD-10-CM

## 2021-01-08 PROCEDURE — 91300 SARS-COV-2 / COVID-19 MRNA VACCINE (PFIZER-BIONTECH) 30 MCG: CPT

## 2021-01-08 PROCEDURE — 0002A SARS-COV-2 / COVID-19 MRNA VACCINE (PFIZER-BIONTECH) 30 MCG: CPT

## 2021-02-03 DIAGNOSIS — R06.00 DOE (DYSPNEA ON EXERTION): Primary | ICD-10-CM

## 2021-03-12 ENCOUNTER — HOSPITAL ENCOUNTER (OUTPATIENT)
Dept: PULMONOLOGY | Facility: HOSPITAL | Age: 56
Discharge: HOME | End: 2021-03-12
Attending: INTERNAL MEDICINE
Payer: COMMERCIAL

## 2021-03-12 DIAGNOSIS — R06.00 DOE (DYSPNEA ON EXERTION): ICD-10-CM

## 2021-03-12 LAB — SARS-COV-2 RNA RESP QL NAA+PROBE: NEGATIVE

## 2021-03-12 PROCEDURE — U0003 INFECTIOUS AGENT DETECTION BY NUCLEIC ACID (DNA OR RNA); SEVERE ACUTE RESPIRATORY SYNDROME CORONAVIRUS 2 (SARS-COV-2) (CORONAVIRUS DISEASE [COVID-19]), AMPLIFIED PROBE TECHNIQUE, MAKING USE OF HIGH THROUGHPUT TECHNOLOGIES AS DESCRIBED BY CMS-2020-01-R: HCPCS | Performed by: INTERNAL MEDICINE

## 2021-03-12 PROCEDURE — U0005 INFEC AGEN DETEC AMPLI PROBE: HCPCS | Performed by: INTERNAL MEDICINE

## 2021-03-12 PROCEDURE — 94060 EVALUATION OF WHEEZING: CPT

## 2021-03-12 PROCEDURE — 94726 PLETHYSMOGRAPHY LUNG VOLUMES: CPT

## 2021-03-12 PROCEDURE — 94729 DIFFUSING CAPACITY: CPT

## 2021-03-12 PROCEDURE — 94726 PLETHYSMOGRAPHY LUNG VOLUMES: CPT | Performed by: INTERNAL MEDICINE

## 2021-03-12 PROCEDURE — 94729 DIFFUSING CAPACITY: CPT | Performed by: INTERNAL MEDICINE

## 2021-03-12 PROCEDURE — 94060 EVALUATION OF WHEEZING: CPT | Performed by: INTERNAL MEDICINE

## 2021-03-12 PROCEDURE — 94760 N-INVAS EAR/PLS OXIMETRY 1: CPT

## 2021-03-17 ENCOUNTER — HOSPITAL ENCOUNTER (OUTPATIENT)
Dept: PULMONOLOGY | Facility: HOSPITAL | Age: 56
Discharge: HOME | End: 2021-03-17
Attending: INTERNAL MEDICINE
Payer: COMMERCIAL

## 2021-03-17 DIAGNOSIS — R06.00 DOE (DYSPNEA ON EXERTION): ICD-10-CM

## 2021-03-17 LAB
BASE EXCESS BLDA CALC-SCNC: -7 MMOL/L (ref -2–3)
BASE EXCESS BLDA CALC-SCNC: 1 MMOL/L (ref -2–3)
GLUCOSE SERPL-MCNC: 112 MG/DL (ref 65–140)
GLUCOSE SERPL-MCNC: 115 MG/DL (ref 65–140)
HCO3 BLDA-SCNC: 17.4 MMOL/L (ref 22–28)
HCO3 BLDA-SCNC: 24.5 MMOL/L (ref 22–28)
HCT VFR BLD CALC: 38 % (ref 34.8–46.1)
HCT VFR BLD CALC: 42 % (ref 34.8–46.1)
HGB BLDA-MCNC: 12.9 G/DL (ref 11.5–15.4)
HGB BLDA-MCNC: 14.3 G/DL (ref 11.5–15.4)
PCO2 BLD: 18 MMOL/L (ref 21–32)
PCO2 BLD: 26 MMOL/L (ref 21–32)
PCO2 BLD: 30.8 MM HG (ref 36–44)
PCO2 BLD: 35.5 MM HG (ref 36–44)
PH BLD: 7.36 [PH] (ref 7.35–7.45)
PH BLD: 7.45 [PH] (ref 7.35–7.45)
PO2 BLD: 120 MM HG (ref 75–129)
PO2 BLD: 38 MM HG (ref 75–129)
POTASSIUM BLD-SCNC: 3.8 MMOL/L (ref 3.5–5.3)
POTASSIUM BLD-SCNC: 4.9 MMOL/L (ref 3.5–5.3)
SAO2 % BLD FROM PO2: 76 % (ref 60–85)
SAO2 % BLD FROM PO2: 99 % (ref 60–85)
SODIUM BLD-SCNC: 137 MMOL/L (ref 136–145)
SODIUM BLD-SCNC: 142 MMOL/L (ref 136–145)
SPECIMEN SOURCE: ABNORMAL
SPECIMEN SOURCE: ABNORMAL

## 2021-03-17 PROCEDURE — 82947 ASSAY GLUCOSE BLOOD QUANT: CPT

## 2021-03-17 PROCEDURE — 84295 ASSAY OF SERUM SODIUM: CPT

## 2021-03-17 PROCEDURE — 85014 HEMATOCRIT: CPT

## 2021-03-17 PROCEDURE — 94621 CARDIOPULM EXERCISE TESTING: CPT | Performed by: INTERNAL MEDICINE

## 2021-03-17 PROCEDURE — 36600 WITHDRAWAL OF ARTERIAL BLOOD: CPT

## 2021-03-17 PROCEDURE — 82803 BLOOD GASES ANY COMBINATION: CPT

## 2021-03-17 PROCEDURE — 84132 ASSAY OF SERUM POTASSIUM: CPT

## 2021-03-17 PROCEDURE — 94621 CARDIOPULM EXERCISE TESTING: CPT

## 2021-03-18 ENCOUNTER — TELEPHONE (OUTPATIENT)
Dept: PULMONOLOGY | Facility: HOSPITAL | Age: 56
End: 2021-03-18

## 2021-03-18 DIAGNOSIS — R06.00 DOE (DYSPNEA ON EXERTION): ICD-10-CM

## 2021-03-18 DIAGNOSIS — R94.2 ABNORMAL PFT: Primary | ICD-10-CM

## 2021-03-18 NOTE — TELEPHONE ENCOUNTER
Discussed PFT results with the patient  There is an isolated diffusion impairment which would raise the possibility of pulmonary hypertension or early interstitial lung disease  For further evaluation, I have ordered echocardiogram and high-resolution chest CT  She will need an appointment with me in the office after all testing is complete to discuss next steps

## 2021-03-24 ENCOUNTER — HOSPITAL ENCOUNTER (OUTPATIENT)
Dept: CT IMAGING | Facility: HOSPITAL | Age: 56
Discharge: HOME | End: 2021-03-24
Attending: INTERNAL MEDICINE
Payer: COMMERCIAL

## 2021-03-24 DIAGNOSIS — R94.2 ABNORMAL PFT: ICD-10-CM

## 2021-03-24 DIAGNOSIS — R06.00 DOE (DYSPNEA ON EXERTION): ICD-10-CM

## 2021-03-24 PROCEDURE — 71250 CT THORAX DX C-: CPT

## 2021-03-24 PROCEDURE — G1004 CDSM NDSC: HCPCS

## 2021-04-19 NOTE — TELEPHONE ENCOUNTER
Pt called to schedule appt with Dr Jessica Alexander as per Jessica Alexander notes  Do you have a certain date or time you would like to squeeze her in and do you want to it be a full 60 min new pt spot    Pt is off from work 4/28/ and may 5th if any of those dates are good for you

## 2021-04-22 ENCOUNTER — HOSPITAL ENCOUNTER (OUTPATIENT)
Dept: NON INVASIVE DIAGNOSTICS | Facility: HOSPITAL | Age: 56
Discharge: HOME | End: 2021-04-22
Attending: INTERNAL MEDICINE
Payer: COMMERCIAL

## 2021-04-22 DIAGNOSIS — R06.00 DOE (DYSPNEA ON EXERTION): ICD-10-CM

## 2021-04-22 DIAGNOSIS — R94.2 ABNORMAL PFT: ICD-10-CM

## 2021-04-22 PROCEDURE — 93306 TTE W/DOPPLER COMPLETE: CPT

## 2021-04-22 PROCEDURE — 93306 TTE W/DOPPLER COMPLETE: CPT | Performed by: INTERNAL MEDICINE

## 2021-05-05 ENCOUNTER — OFFICE VISIT (OUTPATIENT)
Dept: PULMONOLOGY | Facility: HOSPITAL | Age: 56
End: 2021-05-05
Payer: COMMERCIAL

## 2021-05-05 VITALS
HEIGHT: 66 IN | DIASTOLIC BLOOD PRESSURE: 80 MMHG | TEMPERATURE: 98 F | WEIGHT: 227 LBS | HEART RATE: 88 BPM | OXYGEN SATURATION: 95 % | SYSTOLIC BLOOD PRESSURE: 120 MMHG | BODY MASS INDEX: 36.48 KG/M2

## 2021-05-05 DIAGNOSIS — R94.2 ABNORMAL PFT: Primary | ICD-10-CM

## 2021-05-05 DIAGNOSIS — R06.02 SHORTNESS OF BREATH: ICD-10-CM

## 2021-05-05 DIAGNOSIS — G47.33 OSA (OBSTRUCTIVE SLEEP APNEA): ICD-10-CM

## 2021-05-05 PROCEDURE — 99204 OFFICE O/P NEW MOD 45 MIN: CPT | Performed by: INTERNAL MEDICINE

## 2021-05-05 PROCEDURE — 3008F BODY MASS INDEX DOCD: CPT | Performed by: INTERNAL MEDICINE

## 2021-05-05 PROCEDURE — 1036F TOBACCO NON-USER: CPT | Performed by: INTERNAL MEDICINE

## 2021-05-05 RX ORDER — AMLODIPINE AND VALSARTAN 5; 160 MG/1; MG/1
1 TABLET ORAL DAILY
COMMUNITY

## 2021-05-05 NOTE — PROGRESS NOTES
Pulmonary Outpatient Consultation Note   Tristian Méndez 54 y o  female MRN: [id number]  5/5/2021    Referring provider: Arch Phalen, Do  [address],  [address] [place] [address]     Assessment/Plan:      Shortness of breath    The etiology of her shortness of breath is not entirely clear  She has undergone extensive testing, including pulmonary function testing, echocardiogram, cardiopulmonary exercise test and high-resolution chest CT  There is no evidence of pulmonary hypertension or cardiomyopathy  She had relatively normal exercise capacity despite feeling breathless on the CPET  There are minimal changes on the chest CT that cannot explain her symptoms  The isolated diffusion impairment is of unclear significance  I suspect her weight gain has contributed somewhat  She has found Advair to be slightly beneficial, raising the possibility of asthma being a contributing factor  I encouraged her to use the albuterol when she develops the chest tightness and shortness of breath to see if that helps with her symptoms  We could consider methacholine challenge testing in the future  Abnormal PFT    There is an isolated diffusion impairment on PFTs, but no evidence of interstitial lung disease or pulmonary hypertension  Therefore, I would suggest we repeat spirometry with diffusion capacity in 6 months  If she has progressive decline in diffusion capacity, I would then consider right heart catheterization to see if there is an element of pulmonary hypertension that we are not capturing on the echocardiogram   There was no evidence of oxygen desaturation on recent cardiopulmonary exercise test     Obstructive sleep apnea   Patient has symptoms to strongly suggest obstructive sleep apnea  If she does in fact have untreated sleep apnea, this could be contributing to some of her daytime symptoms  We will proceed with home study for further evaluation      Visit orders:    Diagnoses and all orders for this visit:    Abnormal PFT  -     Spirometry with diffusing capacity; Future    Shortness of breath  -     Home Study; Future  -     Spirometry with diffusing capacity; Future    UMER (obstructive sleep apnea)  -     Home Study; Future    Other orders  -     fluticasone-salmeterol (ADVAIR, WIXELA) 100-50 mcg/dose inhaler; Inhale 1 puff 2 (two) times a day  -     Diclofenac Sodium (Voltaren) 1 %; Voltaren 1 % topical gel   APPLY 2 GRAM TO THE AFFECTED AREA(S) BY TOPICAL ROUTE 4 TIMES PER DAY  -     amLODIPine-valsartan (EXFORGE) 5-160 MG per tablet; Take 1 tablet by mouth daily        History of Present Illness   HPI:  Tiny January is a 54 y o  female who   Is here today for evaluation regarding shortness of breath  Approximately 13 years ago, patient developed chest pain and underwent an extensive pulmonary and cardiac workup  This was largely unrevealing and her symptoms somewhat subsided  More recently, over the past few years, she has developed worsening shortness of breath  The shortness of breath is not present at rest   She experiences it mostly with exertion, exercise and climbing stairs  She has occasional wheezing most notable at bedtime  She has an albuterol inhaler and will sometimes take it on those occasions  She also has episodes of chest tightness, located in the mid sternum  During those episodes, she also has subjective feeling of palpitations and heart pounding, but is not tachycardic  She has not tried using the albuterol during those episodes  Late last year, she was prescribed Advair which has helped her symptoms somewhat  She has gained 30 lb over the past few years  She does report snoring and has had episodes of choking and gasping upon awakening  She has daytime hypersomnolence and often wakes up with headaches  She has never been evaluated for sleep apnea  She has no history of asthma  She has some seasonal allergies and has constant sinus strip    This sometimes makes her cough  She has a candidate home  She has undergone sinus surgeries with ENT and at that time they tested her for allergies  From a rheumatologic standpoint, she has been diagnosed with fibromyalgia with diffuse joint and muscle pains  She has occasional reflux/ regurgitation, but usually only when she has eaten something she anticipates giving her problem  Review of Systems   Constitutional: Positive for unexpected weight change  Negative for chills and fever  HENT: Negative for postnasal drip and sore throat  Eyes: Negative for visual disturbance  Respiratory:        As noted in HPI   Cardiovascular: Positive for palpitations  Negative for chest pain and leg swelling  Gastrointestinal: Negative for abdominal pain, diarrhea and vomiting  Musculoskeletal: Positive for arthralgias and myalgias  Negative for joint swelling  Skin: Negative for rash  Neurological: Positive for headaches  Hematological: Positive for adenopathy (Chronic right  cervical adenopathy since childhood)  Psychiatric/Behavioral: Negative  All other systems reviewed and are negative  Historical Information   Past Medical History:   Diagnosis Date    Ankle fracture     Asthma     Cervical spinal stenosis     Closed left arm fracture, sequela     Concussion     Degeneration, intervertebral disc, cervical     Hypertension     Kidney stone     Migraine     Right arm fracture     Seasonal allergies     Shingles     Vitamin D deficiency      Past Surgical History:   Procedure Laterality Date    CARPAL TUNNEL RELEASE Bilateral     CATARACT EXTRACTION Left     MOUTH SURGERY      W[place] COLONOSCOPY FLX DX W/COLLJ SPEC WHEN PFRMD N/A 12/27/2018    Procedure: COLONOSCOPY;  Surgeon: Mayito Villatoro MD;  [facility];   Service: Gastroenterology    SINUS SURGERY      TUBAL LIGATION       Family History   Problem Relation Age of Onset    Heart disease Father     No Known Problems Daughter     Uterine cancer Maternal Aunt 76    No Known Problems Daughter     No Known Problems Daughter     Lung cancer Paternal Aunt         age unknown     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[address] [employer] History     Tobacco Use   Smoking Status Never Smoker   Smokeless Tobacco Never Used       Meds/Allergies     Current Outpatient Medications:     albuterol (PROVENTIL HFA,VENTOLIN HFA) 90 mcg/act inhaler, Inhale 2 puffs every 6 (six) hours, Disp: , Rfl:     amLODIPine-valsartan (EXFORGE) 5-160 MG per tablet, Take 1 tablet by mouth daily, Disp: , Rfl:     Ascorbic Acid (VITAMIN C) 100 MG tablet, Take by mouth daily, Disp: , Rfl:     Cholecalciferol (VITAMIN D3) 3000 units TABS, Take 3,000 Units by mouth daily, Disp: , Rfl:     Diclofenac Sodium (Voltaren) 1 %, Voltaren 1 % topical gel  APPLY 2 GRAM TO THE AFFECTED AREA(S) BY TOPICAL ROUTE 4 TIMES PER DAY, Disp: , Rfl:     dicyclomine (BENTYL) 10 mg capsule, Take 1 capsule (10 mg total) by mouth 3 (three) times a day as needed (diarrhea and pain), Disp: 90 capsule, Rfl: 3    DULoxetine (CYMBALTA) 30 mg delayed release capsule, Take 30 mg by mouth daily, Disp: , Rfl:     fluticasone-salmeterol (ADVAIR, WIXELA) 100-50 mcg/dose inhaler, Inhale 1 puff 2 (two) times a day, Disp: , Rfl:     melatonin 3 mg, Take 3 mg by mouth daily at bedtime, Disp: , Rfl:     meloxicam (MOBIC) 15 mg tablet, Take 15 mg by mouth daily, Disp: , Rfl:     methocarbamol (ROBAXIN) 750 mg tablet, Take 750 mg by mouth 2 (two) times a day, Disp: , Rfl:     Multiple Vitamin (MULTIVITAMIN) capsule, Take 1 capsule by mouth daily, Disp: , Rfl:     Omega-3 1000 MG CAPS, Take by mouth daily, Disp: , Rfl:     fexofenadine (ALLEGRA) 180 MG tablet, Take by mouth as needed, Disp: , Rfl:     fluticasone (FLONASE) 50 mcg/act nasal spray,  2 s by nasal route , Disp: , Rfl:   Allergies   Allergen Reactions    Amoxicillin-Pot Clavulanate Hives    Penicillins      Vitals: Blood pressure 120/80, pulse 88, temperature 98 °F (36 7 °C), temperature source Tympanic, height 5' 6" (1 676 m), weight 103 kg (227 lb), last menstrual period 03/15/2019, SpO2 95 %  , Body mass index is 36 64 kg/m²  Oxygen Therapy  SpO2: 95 %  Oxygen Therapy: None (Room air)  Physical Exam   Physical Exam  Constitutional:       General: She is not in acute distress  HENT:      Head: Normocephalic  Mouth/Throat:      Pharynx: No oropharyngeal exudate  Comments: Crowded posterior oropharynx  Eyes:      General: No scleral icterus  Pupils: Pupils are equal, round, and reactive to light  Neck:      Musculoskeletal: Neck supple  Vascular: No JVD  Cardiovascular:      Rate and Rhythm: Normal rate and regular rhythm  Pulmonary:      Breath sounds: No wheezing or rhonchi  Abdominal:      Palpations: Abdomen is soft  Tenderness: There is no abdominal tenderness  Lymphadenopathy:      Cervical: No cervical adenopathy  Skin:     General: Skin is warm and dry  Neurological:      Mental Status: She is alert and oriented to person, place, and time  Psychiatric:         Mood and Affect: Mood normal          Behavior: Behavior normal          Labs: I have personally reviewed pertinent lab results  Lab Results   Component Value Date    WBC 5 61 11/17/2020    HGB 14 3 03/17/2021    HCT 42 03/17/2021    MCV 95 11/17/2020     11/17/2020     Lab Results   Component Value Date    GLUCOSE 115 03/17/2021    CALCIUM 9 9 11/17/2020    K 4 2 11/17/2020    CO2 18 (L) 03/17/2021     11/17/2020    BUN 21 11/17/2020    CREATININE 0 96 11/17/2020     No results found for: IGE  Lab Results   Component Value Date    ALT 75 11/17/2020    AST 27 11/17/2020    ALKPHOS 46 11/17/2020     Imaging and other studies: I have personally reviewed pertinent reports     and I have personally reviewed pertinent films in PACS   high-resolution chest CT from 3/24/21 shows minimal juxtapleural reticulation and traction bronchiolectasis in the lingula    Pulmonary function testing:  Performed  3/12/21 and personally interpreted  FEV1/FVC ratio 85%    FEV1 97% predicted  FVC 90% predicted  No response to bronchodilators   % predicted   % predicted  DLCO corrected for hemoglobin 60 % predicted   PFTs are normal with the exception of mild diffusion impairment    Other Studies: I have personally reviewed pertinent reports      echocardiogram performed on 4/22/21 shows an EF of 60%, no evidence of pulmonary hypertension or valvular disease    Cardiopulmonary exercise test performed on 3/17/21 is normal

## 2021-05-05 NOTE — ASSESSMENT & PLAN NOTE
There is an isolated diffusion impairment on PFTs, but no evidence of interstitial lung disease or pulmonary hypertension  Therefore, I would suggest we repeat spirometry with diffusion capacity in 6 months    If she has progressive decline in diffusion capacity, I would then consider right heart catheterization to see if there is an element of pulmonary hypertension that we are not capturing on the echocardiogram   There was no evidence of oxygen desaturation on recent cardiopulmonary exercise test

## 2021-05-05 NOTE — ASSESSMENT & PLAN NOTE
The etiology of her shortness of breath is not entirely clear  She has undergone extensive testing, including pulmonary function testing, echocardiogram, cardiopulmonary exercise test and high-resolution chest CT  There is no evidence of pulmonary hypertension or cardiomyopathy  She had relatively normal exercise capacity despite feeling breathless on the CPET  There are minimal changes on the chest CT that cannot explain her symptoms  The isolated diffusion impairment is of unclear significance  I suspect her weight gain has contributed somewhat  She has found Advair to be slightly beneficial, raising the possibility of asthma being a contributing factor  I encouraged her to use the albuterol when she develops the chest tightness and shortness of breath to see if that helps with her symptoms  We could consider methacholine challenge testing in the future

## 2021-05-27 ENCOUNTER — TELEPHONE (OUTPATIENT)
Dept: SLEEP CENTER | Facility: CLINIC | Age: 56
End: 2021-05-27

## 2021-05-27 NOTE — TELEPHONE ENCOUNTER
----- Message from Thalia Rothman MD sent at 5/27/2021 10:20 AM EDT -----  approved  ----- Message -----  From: Trini Augustin  Sent: 5/10/2021   8:25 AM EDT  To: [facility] Provider    This sleep study needs approval      If approved please sign and return to clerical pool  If denied please include reasons why  Also provide alternative testing if warranted  Please sign and return to clerical pool

## 2021-06-20 ENCOUNTER — HOSPITAL ENCOUNTER (OUTPATIENT)
Dept: SLEEP CENTER | Facility: CLINIC | Age: 56
Discharge: HOME | End: 2021-06-20
Payer: COMMERCIAL

## 2021-06-20 DIAGNOSIS — G47.33 OSA (OBSTRUCTIVE SLEEP APNEA): ICD-10-CM

## 2021-06-20 DIAGNOSIS — R06.02 SHORTNESS OF BREATH: ICD-10-CM

## 2021-06-20 PROCEDURE — G0399 HOME SLEEP TEST/TYPE 3 PORTA: HCPCS

## 2021-06-20 NOTE — PROGRESS NOTES
Home Sleep Study Documentation    Pre-Sleep Home Study:    Set-up and instructions performed by: MARIAH Quinn    Technician performed demonstration for Patient: yes    Return demonstration performed by Patient: yes    Written instructions provided to Patient: yes    Patient signed consent form: yes        Post-Sleep Home Study:    Additional comments by Patient: None    Home Sleep Study Failed:no:    Failure reason: N/A    Reported or Detected: N/A    Scored by: ROSIE Gu Se

## 2021-06-21 PROCEDURE — 95806 SLEEP STUDY UNATT&RESP EFFT: CPT | Performed by: INTERNAL MEDICINE

## 2021-07-06 ENCOUNTER — TELEPHONE (OUTPATIENT)
Dept: PULMONOLOGY | Facility: MEDICAL CENTER | Age: 56
End: 2021-07-06

## 2021-09-13 ENCOUNTER — TELEPHONE (OUTPATIENT)
Dept: PULMONOLOGY | Facility: CLINIC | Age: 56
End: 2021-09-13

## 2021-09-13 NOTE — TELEPHONE ENCOUNTER
Ana Santos called for an appointment, she has one day off per week, and it doesn't match up with our schedule  Does she need to have any other testing done before she sees you again? She will have some time before her next appointment

## 2021-09-20 NOTE — TELEPHONE ENCOUNTER
Discussed with patient  Still with exertional shortness of breath  Plan to repeat spirometry with diffusion capacity  If diffusion capacity remains low, would perform ventilation perfusion scan to rule out chronic thromboembolic disease and refer to Cardiology for right heart catheterization

## 2021-10-02 ENCOUNTER — IMMUNIZATIONS (OUTPATIENT)
Dept: FAMILY MEDICINE CLINIC | Facility: HOSPITAL | Age: 56
End: 2021-10-02

## 2021-10-02 DIAGNOSIS — Z23 ENCOUNTER FOR IMMUNIZATION: Primary | ICD-10-CM

## 2021-10-02 PROCEDURE — 0001A SARS-COV-2 / COVID-19 MRNA VACCINE (PFIZER-BIONTECH) 30 MCG: CPT

## 2021-10-02 PROCEDURE — 91300 SARS-COV-2 / COVID-19 MRNA VACCINE (PFIZER-BIONTECH) 30 MCG: CPT

## 2021-10-21 ENCOUNTER — HOSPITAL ENCOUNTER (OUTPATIENT)
Dept: PULMONOLOGY | Facility: HOSPITAL | Age: 56
Discharge: HOME | End: 2021-10-21
Attending: INTERNAL MEDICINE
Payer: COMMERCIAL

## 2021-10-21 DIAGNOSIS — R06.02 SHORTNESS OF BREATH: ICD-10-CM

## 2021-10-21 DIAGNOSIS — R94.2 ABNORMAL PFT: ICD-10-CM

## 2021-10-21 PROCEDURE — 94010 BREATHING CAPACITY TEST: CPT | Performed by: INTERNAL MEDICINE

## 2021-10-21 PROCEDURE — 94760 N-INVAS EAR/PLS OXIMETRY 1: CPT

## 2021-10-21 PROCEDURE — 94729 DIFFUSING CAPACITY: CPT

## 2021-10-21 PROCEDURE — 94729 DIFFUSING CAPACITY: CPT | Performed by: INTERNAL MEDICINE

## 2021-10-21 PROCEDURE — 94060 EVALUATION OF WHEEZING: CPT

## 2021-11-23 ENCOUNTER — HOSPITAL ENCOUNTER (OUTPATIENT)
Dept: MAMMOGRAPHY | Facility: MEDICAL CENTER | Age: 56
Discharge: HOME | End: 2021-11-23
Payer: COMMERCIAL

## 2021-11-23 VITALS — HEIGHT: 66 IN | WEIGHT: 227.07 LBS | BODY MASS INDEX: 36.49 KG/M2

## 2021-11-23 DIAGNOSIS — Z12.31 SCREENING MAMMOGRAM FOR HIGH-RISK PATIENT: ICD-10-CM

## 2021-11-23 PROCEDURE — 77067 SCR MAMMO BI INCL CAD: CPT

## 2021-11-23 PROCEDURE — 77063 BREAST TOMOSYNTHESIS BI: CPT

## 2021-12-08 ENCOUNTER — ANNUAL EXAM (OUTPATIENT)
Dept: OBGYN CLINIC | Facility: CLINIC | Age: 56
End: 2021-12-08
Payer: COMMERCIAL

## 2021-12-08 VITALS
HEIGHT: 66 IN | SYSTOLIC BLOOD PRESSURE: 142 MMHG | BODY MASS INDEX: 36.8 KG/M2 | DIASTOLIC BLOOD PRESSURE: 94 MMHG | WEIGHT: 229 LBS

## 2021-12-08 DIAGNOSIS — Z01.419 WOMEN'S ANNUAL ROUTINE GYNECOLOGICAL EXAMINATION: Primary | ICD-10-CM

## 2021-12-08 DIAGNOSIS — Z12.31 ENCOUNTER FOR SCREENING MAMMOGRAM FOR MALIGNANT NEOPLASM OF BREAST: ICD-10-CM

## 2021-12-08 PROCEDURE — S0612 ANNUAL GYNECOLOGICAL EXAMINA: HCPCS | Performed by: OBSTETRICS & GYNECOLOGY

## 2021-12-27 ENCOUNTER — APPOINTMENT (OUTPATIENT)
Dept: LAB | Facility: MEDICAL CENTER | Age: 56
End: 2021-12-27
Payer: COMMERCIAL

## 2021-12-27 DIAGNOSIS — Z51.81 ENCOUNTER FOR MONITORING NSAID THERAPY: ICD-10-CM

## 2021-12-27 DIAGNOSIS — Z79.1 ENCOUNTER FOR MONITORING NSAID THERAPY: ICD-10-CM

## 2021-12-27 LAB
ALBUMIN SERPL BCP-MCNC: 3.8 G/DL (ref 3.5–5)
ALP SERPL-CCNC: 55 U/L (ref 46–116)
ALT SERPL W P-5'-P-CCNC: 75 U/L (ref 12–78)
ANION GAP SERPL CALCULATED.3IONS-SCNC: 5 MMOL/L (ref 4–13)
AST SERPL W P-5'-P-CCNC: 36 U/L (ref 5–45)
BASOPHILS # BLD AUTO: 0.06 THOUSANDS/ΜL (ref 0–0.1)
BASOPHILS NFR BLD AUTO: 1 % (ref 0–1)
BILIRUB SERPL-MCNC: 0.3 MG/DL (ref 0.2–1)
BUN SERPL-MCNC: 15 MG/DL (ref 5–25)
CALCIUM SERPL-MCNC: 9.9 MG/DL (ref 8.3–10.1)
CHLORIDE SERPL-SCNC: 106 MMOL/L (ref 100–108)
CO2 SERPL-SCNC: 30 MMOL/L (ref 21–32)
CREAT SERPL-MCNC: 0.93 MG/DL (ref 0.6–1.3)
EOSINOPHIL # BLD AUTO: 0.18 THOUSAND/ΜL (ref 0–0.61)
EOSINOPHIL NFR BLD AUTO: 3 % (ref 0–6)
ERYTHROCYTE [DISTWIDTH] IN BLOOD BY AUTOMATED COUNT: 13 % (ref 11.6–15.1)
GFR SERPL CREATININE-BSD FRML MDRD: 68 ML/MIN/1.73SQ M
GLUCOSE P FAST SERPL-MCNC: 96 MG/DL (ref 65–99)
HCT VFR BLD AUTO: 40.2 % (ref 34.8–46.1)
HGB BLD-MCNC: 12.9 G/DL (ref 11.5–15.4)
IMM GRANULOCYTES # BLD AUTO: 0.02 THOUSAND/UL (ref 0–0.2)
IMM GRANULOCYTES NFR BLD AUTO: 0 % (ref 0–2)
LYMPHOCYTES # BLD AUTO: 2.14 THOUSANDS/ΜL (ref 0.6–4.47)
LYMPHOCYTES NFR BLD AUTO: 34 % (ref 14–44)
MCH RBC QN AUTO: 30.6 PG (ref 26.8–34.3)
MCHC RBC AUTO-ENTMCNC: 32.1 G/DL (ref 31.4–37.4)
MCV RBC AUTO: 95 FL (ref 82–98)
MONOCYTES # BLD AUTO: 0.65 THOUSAND/ΜL (ref 0.17–1.22)
MONOCYTES NFR BLD AUTO: 10 % (ref 4–12)
NEUTROPHILS # BLD AUTO: 3.29 THOUSANDS/ΜL (ref 1.85–7.62)
NEUTS SEG NFR BLD AUTO: 52 % (ref 43–75)
NRBC BLD AUTO-RTO: 0 /100 WBCS
PLATELET # BLD AUTO: 276 THOUSANDS/UL (ref 149–390)
PMV BLD AUTO: 9.7 FL (ref 8.9–12.7)
POTASSIUM SERPL-SCNC: 4 MMOL/L (ref 3.5–5.3)
PROT SERPL-MCNC: 7.7 G/DL (ref 6.4–8.2)
RBC # BLD AUTO: 4.22 MILLION/UL (ref 3.81–5.12)
SODIUM SERPL-SCNC: 141 MMOL/L (ref 136–145)
WBC # BLD AUTO: 6.34 THOUSAND/UL (ref 4.31–10.16)

## 2021-12-27 PROCEDURE — 85025 COMPLETE CBC W/AUTO DIFF WBC: CPT

## 2021-12-27 PROCEDURE — 36415 COLL VENOUS BLD VENIPUNCTURE: CPT

## 2021-12-27 PROCEDURE — 80053 COMPREHEN METABOLIC PANEL: CPT

## 2022-03-28 ENCOUNTER — HOSPITAL ENCOUNTER (OUTPATIENT)
Dept: CT IMAGING | Facility: HOSPITAL | Age: 57
Discharge: HOME | End: 2022-03-28
Payer: COMMERCIAL

## 2022-03-28 DIAGNOSIS — R09.82 POSTNASAL DRIP: ICD-10-CM

## 2022-03-28 DIAGNOSIS — Z98.890 HISTORY OF SINUS SURGERY: ICD-10-CM

## 2022-03-28 DIAGNOSIS — K21.9 LARYNGOPHARYNGEAL REFLUX (LPR): ICD-10-CM

## 2022-03-28 DIAGNOSIS — K21.9 GASTROESOPHAGEAL REFLUX DISEASE, UNSPECIFIED WHETHER ESOPHAGITIS PRESENT: ICD-10-CM

## 2022-03-28 DIAGNOSIS — R09.81 CHRONIC NASAL CONGESTION: ICD-10-CM

## 2022-03-28 PROCEDURE — G1004 CDSM NDSC: HCPCS

## 2022-03-28 PROCEDURE — 70486 CT MAXILLOFACIAL W/O DYE: CPT

## 2022-05-03 PROCEDURE — 87205 SMEAR GRAM STAIN: CPT | Performed by: OTOLARYNGOLOGY

## 2022-05-03 PROCEDURE — 87102 FUNGUS ISOLATION CULTURE: CPT | Performed by: OTOLARYNGOLOGY

## 2022-05-03 PROCEDURE — 87070 CULTURE OTHR SPECIMN AEROBIC: CPT | Performed by: OTOLARYNGOLOGY

## 2022-07-29 PROCEDURE — 87205 SMEAR GRAM STAIN: CPT | Performed by: OTOLARYNGOLOGY

## 2022-07-29 PROCEDURE — 87070 CULTURE OTHR SPECIMN AEROBIC: CPT | Performed by: OTOLARYNGOLOGY

## 2022-09-09 ENCOUNTER — HOSPITAL ENCOUNTER (OUTPATIENT)
Dept: CT IMAGING | Facility: HOSPITAL | Age: 57
Discharge: HOME | End: 2022-09-09
Payer: COMMERCIAL

## 2022-09-09 DIAGNOSIS — R09.81 CHRONIC NASAL CONGESTION: ICD-10-CM

## 2022-09-09 DIAGNOSIS — Z98.890 HISTORY OF SINUS SURGERY: ICD-10-CM

## 2022-09-09 DIAGNOSIS — K21.9 LARYNGOPHARYNGEAL REFLUX (LPR): ICD-10-CM

## 2022-09-09 PROCEDURE — 70486 CT MAXILLOFACIAL W/O DYE: CPT

## 2022-09-09 PROCEDURE — G1004 CDSM NDSC: HCPCS

## 2022-11-15 ENCOUNTER — APPOINTMENT (OUTPATIENT)
Dept: LAB | Facility: MEDICAL CENTER | Age: 57
End: 2022-11-15

## 2022-11-15 DIAGNOSIS — M79.7 FIBROMYALGIA: ICD-10-CM

## 2022-11-15 DIAGNOSIS — E55.9 VITAMIN D DEFICIENCY: ICD-10-CM

## 2022-11-15 DIAGNOSIS — E78.5 HYPERLIPIDEMIA, UNSPECIFIED HYPERLIPIDEMIA TYPE: ICD-10-CM

## 2022-11-15 DIAGNOSIS — Z79.899 ENCOUNTER FOR LONG-TERM (CURRENT) USE OF OTHER MEDICATIONS: ICD-10-CM

## 2022-11-15 LAB
25(OH)D3 SERPL-MCNC: 43 NG/ML (ref 30–100)
ALBUMIN SERPL BCP-MCNC: 3.5 G/DL (ref 3.5–5)
ALP SERPL-CCNC: 49 U/L (ref 46–116)
ALT SERPL W P-5'-P-CCNC: 45 U/L (ref 12–78)
ANION GAP SERPL CALCULATED.3IONS-SCNC: 5 MMOL/L (ref 4–13)
AST SERPL W P-5'-P-CCNC: 22 U/L (ref 5–45)
BASOPHILS # BLD AUTO: 0.05 THOUSANDS/ÂΜL (ref 0–0.1)
BASOPHILS NFR BLD AUTO: 1 % (ref 0–1)
BILIRUB SERPL-MCNC: 0.31 MG/DL (ref 0.2–1)
BUN SERPL-MCNC: 17 MG/DL (ref 5–25)
CALCIUM SERPL-MCNC: 9.7 MG/DL (ref 8.3–10.1)
CHLORIDE SERPL-SCNC: 107 MMOL/L (ref 96–108)
CHOLEST SERPL-MCNC: 222 MG/DL
CO2 SERPL-SCNC: 27 MMOL/L (ref 21–32)
CREAT SERPL-MCNC: 1 MG/DL (ref 0.6–1.3)
EOSINOPHIL # BLD AUTO: 0.22 THOUSAND/ÂΜL (ref 0–0.61)
EOSINOPHIL NFR BLD AUTO: 3 % (ref 0–6)
ERYTHROCYTE [DISTWIDTH] IN BLOOD BY AUTOMATED COUNT: 13.1 % (ref 11.6–15.1)
GFR SERPL CREATININE-BSD FRML MDRD: 62 ML/MIN/1.73SQ M
GLUCOSE P FAST SERPL-MCNC: 122 MG/DL (ref 65–99)
HCT VFR BLD AUTO: 39.9 % (ref 34.8–46.1)
HDLC SERPL-MCNC: 44 MG/DL
HGB BLD-MCNC: 12.6 G/DL (ref 11.5–15.4)
IMM GRANULOCYTES # BLD AUTO: 0.02 THOUSAND/UL (ref 0–0.2)
IMM GRANULOCYTES NFR BLD AUTO: 0 % (ref 0–2)
LDLC SERPL CALC-MCNC: 147 MG/DL (ref 0–100)
LYMPHOCYTES # BLD AUTO: 2.35 THOUSANDS/ÂΜL (ref 0.6–4.47)
LYMPHOCYTES NFR BLD AUTO: 36 % (ref 14–44)
MCH RBC QN AUTO: 29.9 PG (ref 26.8–34.3)
MCHC RBC AUTO-ENTMCNC: 31.6 G/DL (ref 31.4–37.4)
MCV RBC AUTO: 95 FL (ref 82–98)
MONOCYTES # BLD AUTO: 0.49 THOUSAND/ÂΜL (ref 0.17–1.22)
MONOCYTES NFR BLD AUTO: 8 % (ref 4–12)
NEUTROPHILS # BLD AUTO: 3.43 THOUSANDS/ÂΜL (ref 1.85–7.62)
NEUTS SEG NFR BLD AUTO: 52 % (ref 43–75)
NONHDLC SERPL-MCNC: 178 MG/DL
NRBC BLD AUTO-RTO: 0 /100 WBCS
PLATELET # BLD AUTO: 301 THOUSANDS/UL (ref 149–390)
PMV BLD AUTO: 9.4 FL (ref 8.9–12.7)
POTASSIUM SERPL-SCNC: 4.8 MMOL/L (ref 3.5–5.3)
PROT SERPL-MCNC: 8 G/DL (ref 6.4–8.4)
RBC # BLD AUTO: 4.21 MILLION/UL (ref 3.81–5.12)
SODIUM SERPL-SCNC: 139 MMOL/L (ref 135–147)
TRIGL SERPL-MCNC: 157 MG/DL
WBC # BLD AUTO: 6.56 THOUSAND/UL (ref 4.31–10.16)

## 2022-12-13 ENCOUNTER — ANNUAL EXAM (OUTPATIENT)
Dept: OBGYN CLINIC | Facility: CLINIC | Age: 57
End: 2022-12-13

## 2022-12-13 VITALS
BODY MASS INDEX: 36.1 KG/M2 | HEIGHT: 66 IN | SYSTOLIC BLOOD PRESSURE: 122 MMHG | DIASTOLIC BLOOD PRESSURE: 82 MMHG | WEIGHT: 224.6 LBS

## 2022-12-13 DIAGNOSIS — Z01.419 WOMEN'S ANNUAL ROUTINE GYNECOLOGICAL EXAMINATION: Primary | ICD-10-CM

## 2022-12-13 NOTE — PROGRESS NOTES
Assessment/Plan:    Patient was informed of a stable menopausal GYN examination  Pap smear was not performed  She will continue to follow with her primary care physician for her blood pressure  She will continue to try to lose more weight  Her colonoscopies and mammograms up-to-date  She should return to my office in 1 year unless new issues occur  Subjective:      Patient ID: General Rocha is a 62 y o  female  HPI    This is a 42-year-old white female, she is a  5 para 3 with 3 prior vaginal deliveries  She is now menopausal   She feels safe at home  She sees a dentist on a regular basis  Colonoscopies are up-to-date  Does have a history of fibromyalgia  This is treated with Cymbalta  She also has a history of hypertension this is treated with Norvasc  Her blood pressure today is 122/82  Her weight is down 5 pounds since a year ago  She feels safe at home  Denies any prior depression or anxiety  There are no new major family illnesses to report  The following portions of the patient's history were reviewed and updated as appropriate: allergies, current medications, past family history, past medical history, past social history, past surgical history and problem list     Review of Systems   HENT: Negative for postnasal drip  Genitourinary:        Stress incontinence not interfering for lifestyle unchanged from last year         Objective:      /82   Ht 5' 6" (1 676 m)   Wt 102 kg (224 lb 9 6 oz)   LMP 03/15/2019 (Exact Date)   BMI 36 25 kg/m²          Physical Exam  Vitals reviewed  Exam conducted with a chaperone present  Constitutional:       Appearance: Normal appearance  HENT:      Head: Normocephalic and atraumatic  Nose: Nose normal       Mouth/Throat:      Mouth: Mucous membranes are moist       Pharynx: Oropharynx is clear  Eyes:      Extraocular Movements: Extraocular movements intact        Pupils: Pupils are equal, round, and reactive to light  Cardiovascular:      Rate and Rhythm: Normal rate and regular rhythm  Pulses: Normal pulses  Heart sounds: Normal heart sounds  Pulmonary:      Effort: Pulmonary effort is normal       Breath sounds: Normal breath sounds  Chest:   Breasts:     Breasts are symmetrical       Right: Normal  No swelling, bleeding, inverted nipple, mass, nipple discharge, skin change or tenderness  Left: Normal  No swelling, bleeding, inverted nipple, mass, nipple discharge, skin change or tenderness  Abdominal:      General: Abdomen is flat  Bowel sounds are normal  There is no distension  Palpations: Abdomen is soft  There is no hepatomegaly, splenomegaly or mass  Tenderness: There is no abdominal tenderness  There is no guarding or rebound  Hernia: No hernia is present  There is no hernia in the left inguinal area or right inguinal area  Genitourinary:     General: Normal vulva  Pubic Area: No rash or pubic lice  Labia:         Right: No rash, tenderness, lesion or injury  Left: No rash, tenderness, lesion or injury  Urethra: No prolapse, urethral pain, urethral swelling or urethral lesion  Vagina: Normal  No signs of injury and foreign body  No vaginal discharge, erythema, tenderness, bleeding, lesions or prolapsed vaginal walls  Cervix: Normal       Uterus: Normal  Not deviated, not enlarged, not fixed, not tender and no uterine prolapse  Adnexa: Right adnexa normal and left adnexa normal         Right: No mass or tenderness  Left: No mass or tenderness  Rectum: Normal    Musculoskeletal:         General: Normal range of motion  Cervical back: Normal range of motion  Lymphadenopathy:      Upper Body:      Right upper body: No supraclavicular or axillary adenopathy  Left upper body: No supraclavicular or axillary adenopathy  Lower Body: No right inguinal adenopathy  No left inguinal adenopathy     Skin:     General: Skin is warm and dry  Neurological:      General: No focal deficit present  Mental Status: She is alert and oriented to person, place, and time     Psychiatric:         Mood and Affect: Mood normal          Behavior: Behavior normal

## 2022-12-13 NOTE — PATIENT INSTRUCTIONS
The patient was informed of a stable menopausal GYN examination  A Pap smear was not performed  She should continue to get her yearly mammograms  Colonoscopy is up-to-date  She will continue to try to lose more weight  She feels safe at home  Mild depression    She should return to my office in 1 year unless new issues occur

## 2022-12-26 ENCOUNTER — CLINICAL SUPPORT (OUTPATIENT)
Dept: URGENT CARE | Facility: MEDICAL CENTER | Age: 57
End: 2022-12-26

## 2022-12-26 DIAGNOSIS — J31.0 CHRONIC RHINOSINUSITIS: ICD-10-CM

## 2022-12-26 DIAGNOSIS — J32.9 CHRONIC RHINOSINUSITIS: ICD-10-CM

## 2022-12-26 DIAGNOSIS — Z01.818 PRE-OP TESTING: ICD-10-CM

## 2022-12-26 LAB
ATRIAL RATE: 77 BPM
P AXIS: 35 DEGREES
PR INTERVAL: 124 MS
QRS AXIS: 33 DEGREES
QRSD INTERVAL: 88 MS
QT INTERVAL: 378 MS
QTC INTERVAL: 427 MS
T WAVE AXIS: 6 DEGREES
VENTRICULAR RATE: 77 BPM

## 2023-01-10 ENCOUNTER — HOSPITAL ENCOUNTER (OUTPATIENT)
Dept: MAMMOGRAPHY | Facility: MEDICAL CENTER | Age: 58
Discharge: HOME | End: 2023-01-10

## 2023-01-10 VITALS — WEIGHT: 224 LBS | HEIGHT: 66 IN | BODY MASS INDEX: 36 KG/M2

## 2023-01-10 DIAGNOSIS — Z12.31 ENCOUNTER FOR SCREENING MAMMOGRAM FOR MALIGNANT NEOPLASM OF BREAST: ICD-10-CM

## 2023-03-07 ENCOUNTER — APPOINTMENT (OUTPATIENT)
Dept: LAB | Facility: HOSPITAL | Age: 58
End: 2023-03-07

## 2023-03-09 ENCOUNTER — APPOINTMENT (OUTPATIENT)
Dept: RADIOLOGY | Age: 58
End: 2023-03-09

## 2023-03-09 ENCOUNTER — OFFICE VISIT (OUTPATIENT)
Dept: URGENT CARE | Age: 58
End: 2023-03-09

## 2023-03-09 DIAGNOSIS — S99.912A INJURY OF LEFT ANKLE, INITIAL ENCOUNTER: ICD-10-CM

## 2023-03-09 DIAGNOSIS — S82.62XA CLOSED AVULSION FRACTURE OF LATERAL MALLEOLUS OF LEFT FIBULA, INITIAL ENCOUNTER: Primary | ICD-10-CM

## 2023-03-13 ENCOUNTER — APPOINTMENT (OUTPATIENT)
Dept: URGENT CARE | Age: 58
End: 2023-03-13

## 2023-03-22 ENCOUNTER — APPOINTMENT (OUTPATIENT)
Dept: URGENT CARE | Age: 58
End: 2023-03-22

## 2023-03-29 ENCOUNTER — OCCMED (OUTPATIENT)
Dept: URGENT CARE | Age: 58
End: 2023-03-29

## 2023-03-29 DIAGNOSIS — S93.402D SPRAIN OF LIGAMENT OF LEFT ANKLE, SUBSEQUENT ENCOUNTER: Primary | ICD-10-CM

## 2023-06-07 DIAGNOSIS — R05.2 SUBACUTE COUGH: Primary | ICD-10-CM

## 2023-06-07 RX ORDER — PREDNISONE 10 MG/1
TABLET ORAL
Qty: 30 TABLET | Refills: 2 | Status: SHIPPED | OUTPATIENT
Start: 2023-06-07

## 2023-06-07 NOTE — PROGRESS NOTES
Pt reports persistent cough depsite Z-pack and prednisone burst   Will RX prednisone taper and check CXR

## 2023-12-12 ENCOUNTER — ANNUAL EXAM (OUTPATIENT)
Dept: OBGYN CLINIC | Facility: CLINIC | Age: 58
End: 2023-12-12
Payer: COMMERCIAL

## 2023-12-12 VITALS
DIASTOLIC BLOOD PRESSURE: 90 MMHG | HEIGHT: 66 IN | WEIGHT: 236 LBS | BODY MASS INDEX: 37.93 KG/M2 | SYSTOLIC BLOOD PRESSURE: 138 MMHG

## 2023-12-12 DIAGNOSIS — Z12.31 ENCOUNTER FOR SCREENING MAMMOGRAM FOR BREAST CANCER: ICD-10-CM

## 2023-12-12 DIAGNOSIS — Z01.419 WOMEN'S ANNUAL ROUTINE GYNECOLOGICAL EXAMINATION: Primary | ICD-10-CM

## 2023-12-12 PROCEDURE — G0145 SCR C/V CYTO,THINLAYER,RESCR: HCPCS | Performed by: OBSTETRICS & GYNECOLOGY

## 2023-12-12 PROCEDURE — S0612 ANNUAL GYNECOLOGICAL EXAMINA: HCPCS | Performed by: OBSTETRICS & GYNECOLOGY

## 2023-12-12 PROCEDURE — G0476 HPV COMBO ASSAY CA SCREEN: HCPCS | Performed by: OBSTETRICS & GYNECOLOGY

## 2023-12-12 NOTE — PATIENT INSTRUCTIONS
The patient was informed of a stable menopausal GYN examination. We had a discussion about weight loss. I suggested the medical weight loss program she may consider. I Pap smear was done today. She does have slight stress urinary continence this is not interfering with her lifestyle. Will continue to monitor her blood pressure. She will return to my office in 1 year unless new issues occur.

## 2023-12-12 NOTE — PROGRESS NOTES
Assessment/Plan:    The patient was informed of a stable menopausal GYN examination. A Pap smear was performed. We had a discussion about weight loss and the medical weight loss program she will consider. A discussion of fibromyalgia  and other issues association with this condition. She does have a history of slight stress urine incontinence. Is not interfering the lifestyle. She needs to make arrangements for her colonoscopy. She sees a dentist on a regular basis. Denies any problem with depression but will admit to some anxiety. A Pap smear was done today. She should return to my office in 1 year. She will have a problem with some hot flashes and night sweats I recommended using the Estroven over-the-counter she will consider. She should return to my office in 1 year. Subjective:      Patient ID: Sven Minor is a 62 y.o. female. HPI    This is a 49-year-old white female, she is a  5 para 3 with 3 prior vaginal deliveries. She is now menopausal.  She is still sexually active. She has a history of hypertension and fibromyalgia. She still having some complications with night sweats and hot flashes from menopause. We talked about using over-the-counter measures. She is not happy with her weight. I strongly suggest consultation with an medical weight loss program she will consider. She is to make arrangements for her next colonoscopy she goes every 5 years. She will continue to get yearly mammograms. She feels safe at home. She does have increased anxiety and working for [employer] reports she is doing better. There are no new major family illnesses reported. Will need a Pap smear today. She does have a history of stress urinary continence this is under control and not interfering with her lifestyle the present time.         The following portions of the patient's history were reviewed and updated as appropriate: allergies, past family history, past medical history, past social history, past surgical history, and problem list.    Review of Systems   All other systems reviewed and are negative. Objective:      /90   Ht 5' 6" (1.676 m)   Wt 107 kg (236 lb)   LMP 03/15/2019 (Exact Date)   BMI 38.09 kg/m²          Physical Exam  Vitals reviewed. Exam conducted with a chaperone present. Constitutional:       Appearance: Normal appearance. HENT:      Head: Normocephalic. Nose: Nose normal.      Mouth/Throat:      Mouth: Mucous membranes are moist.   Eyes:      Pupils: Pupils are equal, round, and reactive to light. Cardiovascular:      Rate and Rhythm: Normal rate and regular rhythm. Pulmonary:      Effort: Pulmonary effort is normal.      Breath sounds: Normal breath sounds. Abdominal:      General: Abdomen is flat. Bowel sounds are normal.      Palpations: There is no hepatomegaly or splenomegaly. Hernia: There is no hernia in the left inguinal area or right inguinal area. Genitourinary:     General: Normal vulva. Pubic Area: No rash or pubic lice. Labia:         Right: No rash, tenderness, lesion or injury. Left: No rash, tenderness, lesion or injury. Urethra: No prolapse, urethral pain, urethral swelling or urethral lesion. Vagina: Normal.      Cervix: Normal.      Uterus: Normal.       Adnexa: Right adnexa normal and left adnexa normal.      Rectum: Normal.      Comments: External genitalia normal limits, the vagina is clean. The cervix is parous. Uterus is anterior normal size adnexa clear bilaterally. There is no evidence of prolapse. A Pap smear was performed. The adnexa is clear. Urethra bladder normal working relationship. Musculoskeletal:      Cervical back: Normal range of motion. Lymphadenopathy:      Lower Body: No right inguinal adenopathy. No left inguinal adenopathy. Skin:     General: Skin is warm and dry. Neurological:      General: No focal deficit present.       Mental Status: She is alert and oriented to person, place, and time.    Psychiatric:         Mood and Affect: Mood normal.         Behavior: Behavior normal.

## 2023-12-14 LAB
HPV HR 12 DNA CVX QL NAA+PROBE: NEGATIVE
HPV16 DNA CVX QL NAA+PROBE: NEGATIVE
HPV18 DNA CVX QL NAA+PROBE: NEGATIVE

## 2023-12-21 LAB
LAB AP GYN PRIMARY INTERPRETATION: NORMAL
Lab: NORMAL

## 2024-02-21 PROBLEM — Z12.11 COLON CANCER SCREENING: Status: RESOLVED | Noted: 2018-11-26 | Resolved: 2024-02-21

## 2024-03-13 ENCOUNTER — OFFICE VISIT (OUTPATIENT)
Dept: URGENT CARE | Facility: MEDICAL CENTER | Age: 59
End: 2024-03-13
Payer: COMMERCIAL

## 2024-03-13 VITALS
TEMPERATURE: 98.7 F | DIASTOLIC BLOOD PRESSURE: 65 MMHG | OXYGEN SATURATION: 95 % | HEART RATE: 85 BPM | WEIGHT: 236.8 LBS | BODY MASS INDEX: 38.22 KG/M2 | SYSTOLIC BLOOD PRESSURE: 127 MMHG

## 2024-03-13 DIAGNOSIS — L08.9 CAT SCRATCH OF LEFT HAND WITH INFECTION, INITIAL ENCOUNTER: Primary | ICD-10-CM

## 2024-03-13 DIAGNOSIS — S60.512A CAT SCRATCH OF LEFT HAND WITH INFECTION, INITIAL ENCOUNTER: Primary | ICD-10-CM
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
## 2024-03-13 PROCEDURE — 99213 OFFICE O/P EST LOW 20 MIN: CPT

## 2024-03-13 RX ORDER — OMEPRAZOLE 20 MG/1
20 CAPSULE, DELAYED RELEASE ORAL DAILY
COMMUNITY

## 2024-03-13 RX ORDER — AZITHROMYCIN 250 MG/1
TABLET, FILM COATED ORAL
Qty: 6 TABLET | Refills: 0 | Status: SHIPPED | OUTPATIENT
Start: 2024-03-13 | End: 2024-03-17

## 2024-03-14 NOTE — PATIENT INSTRUCTIONS
Prescribed course of azithromycin, take as directed.  Keep wound covered, dry. Wash daily with soap and water.  Recommend rest, ice, elevation. Over the counter pain medication as directed on packaging as needed for pain (ex: Tylenol, ibuprofen).    Follow up with PCP in 3-5 days.  Proceed to  ER if symptoms worsen.    If tests are performed, our office will contact you with results only if changes need to made to the care plan discussed with you at the visit. You can review your full [facility] on [facility].    Cat Scratch Disease   AMBULATORY CARE:   Cat scratch disease (CSD)  is caused by bacteria that live in a cat's mouth. You can get CSD by being scratched, licked, or bitten by an infected cat. The germs usually spread after the cat licks its paws and then scratches or bites human skin. CSD can also be spread if you rub your eyes after you hold an infected cat. Symptoms may go away in 2 to 4 months without treatment.  Common symptoms include the following:   Painless blisters or bumps along your wound 3 to 10 days after you have been bitten or scratched    Red, swollen, and painful lymph nodes near the wound, such as in your neck, armpit, and groin     Loss of appetite    Rash, sore throat, headache, or fever    Muscle, joint, or stomach pain    Seek care immediately if:   You have severe pain in your stomach, muscles, bones, or joints.    You have severe pain in the lymph nodes in your neck, armpit, or groin.    You have seizures, headaches, or cannot think clearly.    Call your doctor if:   You have a fever, sore throat, or headache.    You notice swelling in your neck, armpit, or groin.    You have stomach, muscle, or joint pain.    You have a skin rash, itching, or swelling after you take your medicine.    You have questions or concerns about your condition or care.    Treatment for cat scratch disease  may include any of the following:  Medicines  may be given to help treat a bacterial infection or  decrease pain, fever, and swelling.    Incision and drainage  may be used to drain fluid or pus from your lymph nodes.    Surgery  may be used to remove all or part of your affected lymph nodes.    Prevent cat scratch disease:   Always wash your hands after you handle or pet a cat. Use soap and water, and wash for at least 20 seconds. Wash the front and back of each hand, and in between all fingers. Use the fingers of one hand to scrub under the nails of the other hand. Rinse under warm, running water. Dry your hands with a clean towel or paper towel. Use hand  that contains alcohol if soap and water are not available.         Have your cat treated for fleas. Fleas can spread the germ from cat to cat.    Do not allow your cat to lick an open wound on your skin.    Take care when you play with cats to avoid bites or scratches. Avoid rough play.    If you get scratched, licked, or bitten by a cat, wash the area with clean water and soap right away.    Follow up with your doctor as directed:  Write down your questions so you remember to ask them during your visits.  © Copyright [employer] 2023 Information is for End User's use only and may not be sold, redistributed or otherwise used for commercial purposes.  The above information is an  only. It is not intended as medical advice for individual conditions or treatments. Talk to your doctor, nurse or pharmacist before following any medical regimen to see if it is safe and effective for you.

## 2024-03-14 NOTE — PROGRESS NOTES
[facility]        NAME: Maria Luz Ponce is a 58 y.o. female  : 1965    MRN: [id number]  DATE: 2024  TIME: 8:03 PM    Assessment and Plan   Cat scratch of left hand with infection, initial encounter [S60.512A, L08.9, W55.03XA]  1. Cat scratch of left hand with infection, initial encounter  azithromycin (ZITHROMAX) 250 mg tablet        Tetanus up to date (). Prescribed course of azithromycin. Advised symptomatic treatment. Strict ER precautions discussed.    Patient Instructions     Prescribed course of azithromycin, take as directed.  Keep wound covered, dry. Wash daily with soap and water.  Recommend rest, ice, elevation. Over the counter pain medication as directed on packaging as needed for pain (ex: Tylenol, ibuprofen).    Follow up with PCP in 3-5 days.  Proceed to  ER if symptoms worsen.    If tests are performed, our office will contact you with results only if changes need to made to the care plan discussed with you at the visit. [facility]    Chief Complaint     Chief Complaint   Patient presents with    Hand Injury     Cat scratch to left hand from yesterday.         History of Present Illness       Patient presents with cat scratch to top of left hand. [place] cat [place] an animal sanctuary yesterday, broke skin. She cleaned the area with soap and water yesterday after it happened. Denies animal bite. Notes minimal pain to the area. Today noticed area became red and slightly swollen. Denies numbness, tingling, loss of ROM, fever, chills.        Review of Systems   Review of Systems   Constitutional:  Negative for chills and fever.   Musculoskeletal:  Positive for joint swelling.   Skin:  Positive for color change and wound.   Neurological:  Negative for numbness.         Current Medications       Current Outpatient Medications:     albuterol (PROVENTIL HFA,VENTOLIN HFA) 90 mcg/act inhaler, Inhale 2 puffs every 6 (six)  hours, Disp: , Rfl:     amLODIPine-valsartan (EXFORGE) 5-160 MG per tablet, Take 1 tablet by mouth daily, Disp: , Rfl:     Ascorbic Acid (VITAMIN C) 100 MG tablet, Take by mouth daily, Disp: , Rfl:     azithromycin (ZITHROMAX) 250 mg tablet, Take 2 tablets today then 1 tablet daily x 4 days, Disp: 6 tablet, Rfl: 0    Cholecalciferol (VITAMIN D3) 3000 units TABS, Take 3,000 Units by mouth daily, Disp: , Rfl:     Diclofenac Sodium (VOLTAREN) 1 %, Voltaren 1 % topical gel  APPLY 2 GRAM TO THE AFFECTED AREA(S) BY TOPICAL ROUTE 4 TIMES PER DAY, Disp: , Rfl:     dicyclomine (BENTYL) 10 mg capsule, Take 1 capsule (10 mg total) by mouth 3 (three) times a day as needed (diarrhea and pain), Disp: 90 capsule, Rfl: 3    DULoxetine (CYMBALTA) 30 mg delayed release capsule, Take 30 mg by mouth daily, Disp: , Rfl:     fexofenadine (ALLEGRA) 180 MG tablet, Take by mouth as needed, Disp: , Rfl:     fluticasone (FLONASE) 50 mcg/act nasal spray,  2 sprays by nasal route., Disp: , Rfl:     fluticasone (FLONASE) 50 mcg/act nasal spray, SPRAY 2 SPRAYS INTO EACH NOSTRIL 2 TIMES A DAY, Disp: 96 mL, Rfl: 2    fluticasone-salmeterol (ADVAIR, WIXELA) 100-50 mcg/dose inhaler, Inhale 1 puff 2 (two) times a day, Disp: , Rfl:     ipratropium (ATROVENT) 0.06 % nasal spray, 2 sprays into each nostril 4 (four) times a day, Disp: 15 mL, Rfl: 1    ipratropium (ATROVENT) 0.06 % nasal spray, SPRAY 2 SPRAYS INTO EACH NOSTRIL 3 TIMES A DAY., Disp: 45 mL, Rfl: 3    melatonin 3 mg, Take 3 mg by mouth daily at bedtime, Disp: , Rfl:     meloxicam (MOBIC) 15 mg tablet, Take 15 mg by mouth daily, Disp: , Rfl:     methocarbamol (ROBAXIN) 750 mg tablet, Take 750 mg by mouth 2 (two) times a day, Disp: , Rfl:     Omega-3 1000 MG CAPS, Take by mouth daily, Disp: , Rfl:     omeprazole (PriLOSEC) 20 mg delayed release capsule, Take 20 mg by mouth daily, Disp: , Rfl:     sodium chloride (OCEAN) 0.65 % nasal spray, 2 sprays into each nostril every 2 (two) hours while  awake, Disp: 30 mL, Rfl: 2    Xhance 93 MCG/ACT EXHU, INSTILL 1 SPRAY PER NOSTRIL TWICE A DAY, Disp: 16 mL, Rfl: 11    HYDROcodone-acetaminophen (NORCO) 5-325 mg per tablet, Take 1 tablet by mouth every 4 (four) hours as needed for pain Max Daily Amount: 6 tablets (Patient not taking: Reported on 3/13/2024), Disp: 20 tablet, Rfl: 0    Multiple Vitamin (MULTIVITAMIN) capsule, Take 1 capsule by mouth daily (Patient not taking: Reported on 3/13/2024), Disp: , Rfl:     mupirocin (BACTROBAN) 2 % ointment, Apply topically 2 (two) times a day (Patient not taking: Reported on 3/13/2024), Disp: 22 g, Rfl: 0    pantoprazole (PROTONIX) 40 mg tablet, TAKE 1 TABLET BY MOUTH EVERY DAY (Patient not taking: Reported on 3/13/2024), Disp: 90 tablet, Rfl: 0    predniSONE 10 mg tablet, 4 tabs daily x 3 D then 3 tabs daily x 3 D then 2 tabs daily x 3 D then 1 tab daily x 3 D (Patient not taking: Reported on 3/13/2024), Disp: 30 tablet, Rfl: 2    Current Allergies     Allergies as of 03/13/2024 - Reviewed 03/13/2024   Allergen Reaction Noted    Amoxicillin-pot clavulanate Hives     Penicillins              The following portions of the patient's history were reviewed and updated as appropriate: allergies, current medications, past family history, past medical history, past social history, past surgical history and problem list.     Past Medical History:   Diagnosis Date    Ankle fracture     Asthma     Cervical spinal stenosis     Closed left arm fracture, sequela     Concussion     Degeneration, intervertebral disc, cervical     Hypertension     Kidney stone     Migraine     Right arm fracture     Seasonal allergies     Shingles     Vitamin D deficiency        Past Surgical History:   Procedure Laterality Date    CARPAL TUNNEL RELEASE Bilateral     CATARACT EXTRACTION Left     MOUTH SURGERY      UT COLONOSCOPY FLX DX W/COLLJ SPEC WHEN PFRMD N/A 12/27/2018    Procedure: COLONOSCOPY;  Surgeon: Bita Montalvo MD;  Location: [facility];   Service: Gastroenterology    SINUS SURGERY      TUBAL LIGATION         Family History   Problem Relation Age of Onset    Heart disease Father     No Known Problems Sister     No Known Problems Daughter     No Known Problems Daughter     No Known Problems Daughter     No Known Problems Maternal Grandmother     No Known Problems Maternal Grandfather     No Known Problems Paternal Grandmother     No Known Problems Paternal Grandfather     Uterine cancer Maternal Aunt 75    Lung cancer Paternal Aunt         age unknown         Medications have been verified.        Objective   LMP 03/15/2019 (Exact Date)        Physical Exam     Physical Exam  Vitals and nursing note reviewed.   Constitutional:       General: She is not in acute distress.     Appearance: Normal appearance. She is not ill-appearing.   Cardiovascular:      Rate and Rhythm: Normal rate and regular rhythm.      Pulses: Normal pulses.      Heart sounds: Normal heart sounds.   Pulmonary:      Effort: Pulmonary effort is normal.      Breath sounds: Normal breath sounds.   Skin:     Findings: Erythema and laceration present.      Comments: Superficial lacerations to dorsal left hand. Erythema and slight swelling noted around dorsal hand at area of first and second MTPs. ROM fingers and hand intact, NVI distally.   Neurological:      Mental Status: She is alert.

## 2024-04-02 ENCOUNTER — HOSPITAL ENCOUNTER (OUTPATIENT)
Dept: MAMMOGRAPHY | Facility: MEDICAL CENTER | Age: 59
Discharge: HOME | End: 2024-04-02
Payer: COMMERCIAL

## 2024-04-02 VITALS — HEIGHT: 66 IN | WEIGHT: 236 LBS | BODY MASS INDEX: 37.93 KG/M2

## 2024-04-02 DIAGNOSIS — Z12.31 ENCOUNTER FOR SCREENING MAMMOGRAM FOR BREAST CANCER: ICD-10-CM

## 2024-04-02 PROCEDURE — 77067 SCR MAMMO BI INCL CAD: CPT

## 2024-04-02 PROCEDURE — 77063 BREAST TOMOSYNTHESIS BI: CPT

## 2024-05-15 ENCOUNTER — TELEPHONE (OUTPATIENT)
Dept: GASTROENTEROLOGY | Facility: AMBULARY SURGERY CENTER | Age: 59
End: 2024-05-15

## 2024-05-15 DIAGNOSIS — Z12.11 ENCOUNTER FOR COLORECTAL CANCER SCREENING: Primary | ICD-10-CM

## 2024-05-15 DIAGNOSIS — Z12.12 ENCOUNTER FOR COLORECTAL CANCER SCREENING: Primary | ICD-10-CM

## 2024-05-15 RX ORDER — SODIUM, POTASSIUM,MAG SULFATES 17.5-3.13G
177 SOLUTION, RECONSTITUTED, ORAL ORAL ONCE
Qty: 354 ML | Refills: 0 | Status: SHIPPED | OUTPATIENT
Start: 2024-05-15 | End: 2024-05-15

## 2024-05-15 NOTE — TELEPHONE ENCOUNTER
Scheduled date of colonoscopy (as of today): 06/10/24  Physician performing colonoscopy: Dr Montalvo  Location of colonoscopy: [place]   Bowel prep reviewed with patient:   Instructions reviewed with patient by: Scheduled by Nolvia JIMÉNEZ  Clearances:

## 2024-06-10 ENCOUNTER — HOSPITAL ENCOUNTER (OUTPATIENT)
Dept: GASTROENTEROLOGY | Facility: AMBULARY SURGERY CENTER | Age: 59
Setting detail: OUTPATIENT SURGERY
Discharge: HOME | End: 2024-06-10
Attending: INTERNAL MEDICINE | Admitting: INTERNAL MEDICINE
Payer: COMMERCIAL

## 2024-06-10 ENCOUNTER — ANESTHESIA EVENT (OUTPATIENT)
Dept: GASTROENTEROLOGY | Facility: AMBULARY SURGERY CENTER | Age: 59
End: 2024-06-10

## 2024-06-10 ENCOUNTER — ANESTHESIA (OUTPATIENT)
Dept: GASTROENTEROLOGY | Facility: AMBULARY SURGERY CENTER | Age: 59
End: 2024-06-10

## 2024-06-10 VITALS
RESPIRATION RATE: 18 BRPM | DIASTOLIC BLOOD PRESSURE: 67 MMHG | SYSTOLIC BLOOD PRESSURE: 115 MMHG | BODY MASS INDEX: 36.96 KG/M2 | OXYGEN SATURATION: 100 % | TEMPERATURE: 98 F | HEART RATE: 74 BPM | WEIGHT: 230 LBS | HEIGHT: 66 IN

## 2024-06-10 DIAGNOSIS — R74.8 ELEVATED LIVER ENZYMES: Primary | ICD-10-CM

## 2024-06-10 DIAGNOSIS — Z12.11 SCREENING FOR COLON CANCER: ICD-10-CM

## 2024-06-10 PROCEDURE — G0121 COLON CA SCRN NOT HI RSK IND: HCPCS | Performed by: INTERNAL MEDICINE

## 2024-06-10 RX ORDER — SODIUM CHLORIDE, SODIUM LACTATE, POTASSIUM CHLORIDE, CALCIUM CHLORIDE 600; 310; 30; 20 MG/100ML; MG/100ML; MG/100ML; MG/100ML
125 INJECTION, SOLUTION INTRAVENOUS CONTINUOUS
Status: DISCONTINUED | OUTPATIENT
Start: 2024-06-10 | End: 2024-06-14 | Stop reason: HOSPADM

## 2024-06-10 RX ORDER — SODIUM CHLORIDE, SODIUM LACTATE, POTASSIUM CHLORIDE, CALCIUM CHLORIDE 600; 310; 30; 20 MG/100ML; MG/100ML; MG/100ML; MG/100ML
INJECTION, SOLUTION INTRAVENOUS CONTINUOUS PRN
Status: DISCONTINUED | OUTPATIENT
Start: 2024-06-10 | End: 2024-06-10

## 2024-06-10 RX ORDER — DICYCLOMINE HYDROCHLORIDE 10 MG/1
10 CAPSULE ORAL 3 TIMES DAILY PRN
Qty: 90 CAPSULE | Refills: 3 | Status: SHIPPED | OUTPATIENT
Start: 2024-06-10 | End: 2024-07-10

## 2024-06-10 RX ORDER — LIDOCAINE HYDROCHLORIDE 10 MG/ML
INJECTION, SOLUTION EPIDURAL; INFILTRATION; INTRACAUDAL; PERINEURAL AS NEEDED
Status: DISCONTINUED | OUTPATIENT
Start: 2024-06-10 | End: 2024-06-10

## 2024-06-10 RX ORDER — PROPOFOL 10 MG/ML
INJECTION, EMULSION INTRAVENOUS AS NEEDED
Status: DISCONTINUED | OUTPATIENT
Start: 2024-06-10 | End: 2024-06-10

## 2024-06-10 RX ADMIN — PROPOFOL 50 MG: 10 INJECTION, EMULSION INTRAVENOUS at 08:08

## 2024-06-10 RX ADMIN — PROPOFOL 50 MG: 10 INJECTION, EMULSION INTRAVENOUS at 08:04

## 2024-06-10 RX ADMIN — SODIUM CHLORIDE, SODIUM LACTATE, POTASSIUM CHLORIDE, AND CALCIUM CHLORIDE: .6; .31; .03; .02 INJECTION, SOLUTION INTRAVENOUS at 07:56

## 2024-06-10 RX ADMIN — PROPOFOL 50 MG: 10 INJECTION, EMULSION INTRAVENOUS at 08:12

## 2024-06-10 RX ADMIN — PROPOFOL 100 MG: 10 INJECTION, EMULSION INTRAVENOUS at 08:02

## 2024-06-10 RX ADMIN — LIDOCAINE HYDROCHLORIDE 50 MG: 10 INJECTION, SOLUTION EPIDURAL; INFILTRATION; INTRACAUDAL; PERINEURAL at 08:02

## 2024-06-10 RX ADMIN — SODIUM CHLORIDE, SODIUM LACTATE, POTASSIUM CHLORIDE, AND CALCIUM CHLORIDE 125 ML/HR: .6; .31; .03; .02 INJECTION, SOLUTION INTRAVENOUS at 07:45

## 2024-06-10 RX ADMIN — PROPOFOL 50 MG: 10 INJECTION, EMULSION INTRAVENOUS at 08:18

## 2024-06-10 NOTE — H&P
"History and Physical - [facility]  Maria Luz Ponce 58 y.o. female MRN: [id number]    HPI: Maria Luz Ponce is a 58 y.o. year old female who presents for colon cancer screening, has history of polyps.      Review of Systems    Historical Information   Past Medical History:   Diagnosis Date    Ankle fracture     Asthma     Cervical spinal stenosis     Closed left arm fracture, sequela     Concussion     Degeneration, intervertebral disc, cervical     Hypertension     Kidney stone     Migraine     Right arm fracture     Seasonal allergies     Shingles     Vitamin D deficiency      Past Surgical History:   Procedure Laterality Date    CARPAL TUNNEL RELEASE Bilateral     CATARACT EXTRACTION Left     MOUTH SURGERY      [place] COLONOSCOPY FLX DX W/COLLJ SPEC WHEN PFRMD N/A 12/27/2018    Procedure: COLONOSCOPY;  Surgeon: Bita Montalvo MD;  Location: [facility];  Service: Gastroenterology    SINUS SURGERY      TUBAL LIGATION       Social History   Social History     Substance and Sexual Activity   Alcohol Use Yes    Comment: social     Social History     Substance and Sexual Activity   Drug Use No     Social History     Tobacco Use   Smoking Status Never   Smokeless Tobacco Never     Family History   Problem Relation Age of Onset    Heart disease Father     No Known Problems Daughter     No Known Problems Daughter     No Known Problems Daughter     No Known Problems Maternal Grandmother     No Known Problems Maternal Grandfather     No Known Problems Paternal Grandmother     No Known Problems Paternal Grandfather     Uterine cancer Maternal Aunt 75    Lung cancer Paternal Aunt         age unknown       Meds/Allergies     Not in a hospital admission.    Allergies   Allergen Reactions    Amoxicillin-Pot Clavulanate Hives    Penicillins        Objective     Ht 5' 6\" (1.676 m)   Wt 104 kg (230 lb)   LMP 03/15/2019 (Exact Date)   BMI 37.12 kg/m²       PHYSICAL EXAM    Gen: NAD  CV: RRR  CHEST: Clear  ABD: " soft, NT/ND  EXT: no edema  Neuro: AAO      ASSESSMENT/PLAN:  This is a 58 y.o. year old female here for colon cancer screening, has history of colon polyps.    PLAN:   Procedure: Colonoscopy.

## 2024-06-10 NOTE — ANESTHESIA POSTPROCEDURE EVALUATION
Post-Op Assessment Note    CV Status:  Stable  Pain Score: 0    Pain management: adequate       Mental Status:  Sleepy and arousable   Hydration Status:  Stable   PONV Controlled:  None   Airway Patency:  Patent  Two or more mitigation strategies used for obstructive sleep apnea   Post Op Vitals Reviewed: Yes    No anethesia notable event occurred.    Staff: NICOLÁS               /53 (06/10/24 0825)    Temp      Pulse 82 (06/10/24 0825)   Resp 16 (06/10/24 0825)    SpO2 99 % (06/10/24 0825)

## 2024-06-10 NOTE — ANESTHESIA PREPROCEDURE EVALUATION
Procedure:  COLONOSCOPY    Relevant Problems   MUSCULOSKELETAL   (+) Lumbar facet joint pain      PULMONARY   (+) UMER (obstructive sleep apnea)   (+) Shortness of breath        Physical Exam    Airway    Mallampati score: II  TM Distance: >3 FB  Neck ROM: full     Dental   No notable dental hx     Cardiovascular  Cardiovascular exam normal    Pulmonary  Pulmonary exam normal     Other Findings  post-pubertal.      Anesthesia Plan  ASA Score- 2     Anesthesia Type- IV sedation with anesthesia with ASA Monitors.         Additional Monitors:     Airway Plan:            Plan Factors-Exercise tolerance (METS): >4 METS.    Chart reviewed.   Existing labs reviewed. Patient summary reviewed.    Patient is not a current smoker.      Obstructive sleep apnea risk education given perioperatively.        Induction-     Postoperative Plan-     Perioperative Resuscitation Plan - Level 1 - Full Code.       Informed Consent- Anesthetic plan and risks discussed with patient.  I personally reviewed this patient with the CRNA. Discussed and agreed on the Anesthesia Plan with the CRNA..

## 2024-07-07 DIAGNOSIS — Z12.11 SCREENING FOR COLON CANCER: ICD-10-CM

## 2024-07-08 DIAGNOSIS — R79.89 ELEVATED LFTS: Primary | ICD-10-CM

## 2024-07-09 ENCOUNTER — APPOINTMENT (OUTPATIENT)
Dept: LAB | Facility: MEDICAL CENTER | Age: 59
End: 2024-07-09
Payer: COMMERCIAL

## 2024-07-09 DIAGNOSIS — R74.8 ELEVATED LIVER ENZYMES: ICD-10-CM

## 2024-07-09 DIAGNOSIS — R79.89 ELEVATED LFTS: ICD-10-CM

## 2024-07-09 LAB
ALBUMIN SERPL BCG-MCNC: 4.2 G/DL (ref 3.5–5)
ALP SERPL-CCNC: 41 U/L (ref 34–104)
ALT SERPL W P-5'-P-CCNC: 52 U/L (ref 7–52)
ANA SER QL IA: NEGATIVE
ANION GAP SERPL CALCULATED.3IONS-SCNC: 11 MMOL/L (ref 4–13)
AST SERPL W P-5'-P-CCNC: 35 U/L (ref 13–39)
BASOPHILS # BLD AUTO: 0.06 THOUSANDS/ÂΜL (ref 0–0.1)
BASOPHILS NFR BLD AUTO: 1 % (ref 0–1)
BILIRUB SERPL-MCNC: 0.53 MG/DL (ref 0.2–1)
BUN SERPL-MCNC: 15 MG/DL (ref 5–25)
CALCIUM SERPL-MCNC: 9.9 MG/DL (ref 8.4–10.2)
CHLORIDE SERPL-SCNC: 104 MMOL/L (ref 96–108)
CO2 SERPL-SCNC: 24 MMOL/L (ref 21–32)
CREAT SERPL-MCNC: 0.92 MG/DL (ref 0.6–1.3)
EOSINOPHIL # BLD AUTO: 0.2 THOUSAND/ÂΜL (ref 0–0.61)
EOSINOPHIL NFR BLD AUTO: 3 % (ref 0–6)
ERYTHROCYTE [DISTWIDTH] IN BLOOD BY AUTOMATED COUNT: 13 % (ref 11.6–15.1)
GFR SERPL CREATININE-BSD FRML MDRD: 68 ML/MIN/1.73SQ M
GLUCOSE P FAST SERPL-MCNC: 99 MG/DL (ref 65–99)
HBV CORE AB SER QL: NORMAL
HBV CORE IGM SER QL: NORMAL
HBV SURFACE AG SER QL: NORMAL
HCT VFR BLD AUTO: 44.3 % (ref 34.8–46.1)
HCV AB SER QL: NORMAL
HGB BLD-MCNC: 14.4 G/DL (ref 11.5–15.4)
IGA SERPL-MCNC: 251 MG/DL (ref 66–433)
IGG SERPL-MCNC: 1173 MG/DL (ref 635–1741)
IGM SERPL-MCNC: 70 MG/DL (ref 45–281)
IMM GRANULOCYTES # BLD AUTO: 0.02 THOUSAND/UL (ref 0–0.2)
IMM GRANULOCYTES NFR BLD AUTO: 0 % (ref 0–2)
IRON SATN MFR SERPL: 29 % (ref 15–50)
IRON SERPL-MCNC: 103 UG/DL (ref 50–212)
LYMPHOCYTES # BLD AUTO: 2.44 THOUSANDS/ÂΜL (ref 0.6–4.47)
LYMPHOCYTES NFR BLD AUTO: 32 % (ref 14–44)
MCH RBC QN AUTO: 30.4 PG (ref 26.8–34.3)
MCHC RBC AUTO-ENTMCNC: 32.5 G/DL (ref 31.4–37.4)
MCV RBC AUTO: 94 FL (ref 82–98)
MONOCYTES # BLD AUTO: 0.6 THOUSAND/ÂΜL (ref 0.17–1.22)
MONOCYTES NFR BLD AUTO: 8 % (ref 4–12)
NEUTROPHILS # BLD AUTO: 4.22 THOUSANDS/ÂΜL (ref 1.85–7.62)
NEUTS SEG NFR BLD AUTO: 56 % (ref 43–75)
NRBC BLD AUTO-RTO: 0 /100 WBCS
PLATELET # BLD AUTO: 303 THOUSANDS/UL (ref 149–390)
PMV BLD AUTO: 10.2 FL (ref 8.9–12.7)
POTASSIUM SERPL-SCNC: 4.3 MMOL/L (ref 3.5–5.3)
PROT SERPL-MCNC: 7.5 G/DL (ref 6.4–8.4)
RBC # BLD AUTO: 4.74 MILLION/UL (ref 3.81–5.12)
SODIUM SERPL-SCNC: 139 MMOL/L (ref 135–147)
TIBC SERPL-MCNC: 351 UG/DL (ref 250–450)
UIBC SERPL-MCNC: 248 UG/DL (ref 155–355)
WBC # BLD AUTO: 7.54 THOUSAND/UL (ref 4.31–10.16)

## 2024-07-09 PROCEDURE — 82103 ALPHA-1-ANTITRYPSIN TOTAL: CPT

## 2024-07-09 PROCEDURE — 83550 IRON BINDING TEST: CPT

## 2024-07-09 PROCEDURE — 83540 ASSAY OF IRON: CPT

## 2024-07-09 PROCEDURE — 86381 MITOCHONDRIAL ANTIBODY EACH: CPT

## 2024-07-09 PROCEDURE — 82784 ASSAY IGA/IGD/IGG/IGM EACH: CPT

## 2024-07-09 PROCEDURE — 87340 HEPATITIS B SURFACE AG IA: CPT

## 2024-07-09 PROCEDURE — 86704 HEP B CORE ANTIBODY TOTAL: CPT

## 2024-07-09 PROCEDURE — 86803 HEPATITIS C AB TEST: CPT

## 2024-07-09 PROCEDURE — 82785 ASSAY OF IGE: CPT

## 2024-07-09 PROCEDURE — 86705 HEP B CORE ANTIBODY IGM: CPT

## 2024-07-09 PROCEDURE — 86038 ANTINUCLEAR ANTIBODIES: CPT

## 2024-07-09 PROCEDURE — 85025 COMPLETE CBC W/AUTO DIFF WBC: CPT

## 2024-07-09 PROCEDURE — 86015 ACTIN ANTIBODY EACH: CPT

## 2024-07-09 PROCEDURE — 82390 ASSAY OF CERULOPLASMIN: CPT

## 2024-07-09 PROCEDURE — 36415 COLL VENOUS BLD VENIPUNCTURE: CPT

## 2024-07-09 PROCEDURE — 80053 COMPREHEN METABOLIC PANEL: CPT

## 2024-07-09 RX ORDER — DICYCLOMINE HYDROCHLORIDE 10 MG/1
10 CAPSULE ORAL 3 TIMES DAILY PRN
Qty: 90 CAPSULE | Refills: 0 | Status: SHIPPED | OUTPATIENT
Start: 2024-07-09 | End: 2024-08-08

## 2024-07-10 LAB
A1AT SERPL-MCNC: 136 MG/DL (ref 101–187)
ACTIN IGG SERPL-ACNC: 2 UNITS (ref 0–19)
CERULOPLASMIN SERPL-MCNC: 24.5 MG/DL (ref 19–39)
MITOCHONDRIA M2 IGG SER-ACNC: <20 UNITS (ref 0–20)
TOTAL IGE SMQN RAST: 26.6 KU/L (ref 0–113)

## 2024-07-23 ENCOUNTER — OFFICE VISIT (OUTPATIENT)
Dept: BARIATRICS | Facility: CLINIC | Age: 59
End: 2024-07-23
Payer: COMMERCIAL

## 2024-07-23 VITALS
WEIGHT: 231.2 LBS | BODY MASS INDEX: 38.52 KG/M2 | TEMPERATURE: 97.6 F | RESPIRATION RATE: 16 BRPM | HEIGHT: 65 IN | SYSTOLIC BLOOD PRESSURE: 129 MMHG | DIASTOLIC BLOOD PRESSURE: 84 MMHG | HEART RATE: 95 BPM

## 2024-07-23 DIAGNOSIS — R73.03 PREDIABETES: ICD-10-CM

## 2024-07-23 DIAGNOSIS — K76.0 HEPATIC STEATOSIS: ICD-10-CM

## 2024-07-23 DIAGNOSIS — I10 PRIMARY HYPERTENSION: ICD-10-CM

## 2024-07-23 DIAGNOSIS — E66.01 CLASS 2 SEVERE OBESITY DUE TO EXCESS CALORIES WITH SERIOUS COMORBIDITY AND BODY MASS INDEX (BMI) OF 37.0 TO 37.9 IN ADULT (HCC): Primary | ICD-10-CM

## 2024-07-23 DIAGNOSIS — E78.2 MIXED HYPERLIPIDEMIA: ICD-10-CM

## 2024-07-23 PROBLEM — E66.812 CLASS 2 SEVERE OBESITY DUE TO EXCESS CALORIES WITH SERIOUS COMORBIDITY AND BODY MASS INDEX (BMI) OF 37.0 TO 37.9 IN ADULT (HCC): Status: ACTIVE | Noted: 2024-07-23

## 2024-07-23 PROCEDURE — 99204 OFFICE O/P NEW MOD 45 MIN: CPT | Performed by: PHYSICIAN ASSISTANT

## 2024-07-23 RX ORDER — GABAPENTIN 100 MG/1
CAPSULE ORAL
COMMUNITY
Start: 2024-04-23

## 2024-07-23 RX ORDER — TIRZEPATIDE 2.5 MG/.5ML
2.5 INJECTION, SOLUTION SUBCUTANEOUS WEEKLY
Qty: 2 ML | Refills: 0 | Status: SHIPPED | OUTPATIENT
Start: 2024-07-23 | End: 2024-08-20

## 2024-07-23 RX ORDER — FAMOTIDINE 10 MG
10 TABLET ORAL 2 TIMES DAILY
COMMUNITY

## 2024-07-23 RX ORDER — DULOXETIN HYDROCHLORIDE 60 MG/1
CAPSULE, DELAYED RELEASE ORAL
COMMUNITY
Start: 2024-05-12

## 2024-07-23 NOTE — ASSESSMENT & PLAN NOTE
-Discussed options of HealthyCORE-Intensive Lifestyle Intervention Program, Very Low Calorie Diet-VLCD, Conservative Program, Deven-En-Y Gastric Bypass, and Vertical Sleeve Gastrectomy and the role of weight loss medications.Explained the importance of making lifestyle changes if utilizing medication to aid in weight loss  -not interested in surgery  -Initial weight loss goal of 5-10% weight loss for improved health  -Screening labs and records reviewed from prior  - STOP BANG-6/8-she plans to contact pulmonology about recheck for sleep apenea.    -Patient is interested in pursuing healthycore    Goals:  -try to get a lean protein with meals.  Recommend 2 servings of vegetables daily  - To drink at least 64oz of water daily.No sugary beverages.      To start on zepbound They have tried more than 6 months of lifestyle modifications including diet and activity changes and has had insignificant weight loss of less than 1 lb a week. Patient denies personal and family history of MCT and MEN2 tumors. Patient denies personal history of pancreatitis. Side effects discussed but not limited to diarrhea, bloating, constipation, GI upset, heartburn, increased heart rate, headache, low blood sugar, fatigue and dizziness. Titration and medication administration discussed.  Medication agreement signed    Initial Weight:231.2  Goal Weight:150

## 2024-07-23 NOTE — PROGRESS NOTES
Assessment/Plan:    Class 2 severe obesity due to excess calories with serious comorbidity and body mass index (BMI) of 37.0 to 37.9 in adult (HCC)  -Discussed options of HealthyCORE-Intensive Lifestyle Intervention Program, Very Low Calorie Diet-VLCD, Conservative Program, Deven-En-Y Gastric Bypass, and Vertical Sleeve Gastrectomy and the role of weight loss medications.Explained the importance of making lifestyle changes if utilizing medication to aid in weight loss  -not interested in surgery  -Initial weight loss goal of 5-10% weight loss for improved health  -Screening labs and records reviewed from prior  - STOP BANG-6/8-she plans to contact pulmonology about recheck for sleep apenea.    -Patient is interested in pursuing healthycore    Goals:  -try to get a lean protein with meals.  Recommend 2 servings of vegetables daily  - To drink at least 64oz of water daily.No sugary beverages.      To start on zepbound They have tried more than 6 months of lifestyle modifications including diet and activity changes and has had insignificant weight loss of less than 1 lb a week. Patient denies personal and family history of MCT and MEN2 tumors. Patient denies personal history of pancreatitis. Side effects discussed but not limited to diarrhea, bloating, constipation, GI upset, heartburn, increased heart rate, headache, low blood sugar, fatigue and dizziness. Titration and medication administration discussed.  Medication agreement signed    Initial Weight:231.2  Goal Weight:150        Hepatic steatosis  -should improve with weight loss, dietary, and lifestyle changes      Prediabetes  -should improve with weight loss, dietary, and lifestyle changes        Hypertension  On exforge  -should improve with weight loss, dietary, and lifestyle changes      Mixed hyperlipidemia  -should improve with weight loss, dietary, and lifestyle changes      Return in about 6 months (around 1/23/2025)and for weight check 2 months  .      Diagnoses and all orders for this visit:    Class 2 severe obesity due to excess calories with serious comorbidity and body mass index (BMI) of 37.0 to 37.9 in adult (HCC)  -     tirzepatide (Zepbound) 2.5 mg/0.5 mL auto-injector; Inject 0.5 mL (2.5 mg total) under the skin once a week for 28 days    Hepatic steatosis  -     tirzepatide (Zepbound) 2.5 mg/0.5 mL auto-injector; Inject 0.5 mL (2.5 mg total) under the skin once a week for 28 days    Prediabetes  -     tirzepatide (Zepbound) 2.5 mg/0.5 mL auto-injector; Inject 0.5 mL (2.5 mg total) under the skin once a week for 28 days    Primary hypertension  -     tirzepatide (Zepbound) 2.5 mg/0.5 mL auto-injector; Inject 0.5 mL (2.5 mg total) under the skin once a week for 28 days    Mixed hyperlipidemia  -     tirzepatide (Zepbound) 2.5 mg/0.5 mL auto-injector; Inject 0.5 mL (2.5 mg total) under the skin once a week for 28 days    Other orders  -     gabapentin (NEURONTIN) 100 mg capsule  -     DULoxetine (CYMBALTA) 60 mg delayed release capsule  -     famotidine (PEPCID) 10 mg tablet; Take 10 mg by mouth 2 (two) times a day          Subjective:   Chief Complaint   Patient presents with    Consult     MWM- Consult; GW 150lb; Waist 45.5in- Stop Bang 6/8       Patient ID: Maria Luz Ponce  is a 58 y.o. female with excess weight/obesity here to pursue weight management.    Past Medical History:   Diagnosis Date    Ankle fracture     Asthma     Cervical spinal stenosis     Closed left arm fracture, sequela     Concussion     Degeneration, intervertebral disc, cervical     Hypertension     Kidney stone     Migraine     Mixed hyperlipidemia 1/5/2021    Right arm fracture     Seasonal allergies     Shingles     Vitamin D deficiency        HPI: Here for MWM consult  Has noticed weight change for last 10 years relates to diet, sedentary life, menopause.  Does not typically do breakfast and then gets to work and will eat when she can. Sometimes will do a protein based  snack pack   Has tried noom and myfitnesspal. Will do well with food until coming home from work.  She will track but then fall off track with it.    She did have sleep study in 2021 .  She does feel fatigue and has HTN. She has gained weight since then      Obesity/Excess Weight:  Severity:  class II with  HTN, GERD , hepatic steatosis  Onset:  weight gain with permienopause.  About last 10 years    Modifiers: Diet and Exercise and Commercial Weight Loss Programs-ie. Weight Watchers, [employer], Nutrisystem, etc.  Contributing factors: Poor Food Choices, Insufficient Physical Activity, and Menopause    Hydration:max 32 oz water, soda at least 12 oz, tea 1 min 16 oz 2 sugar w/milk, iced tea 1 a day pureleaf. Sometimes OJ  Alcohol: none  Exercise:nothing formal-has gym membership  Occupation:nurse-management [employer]  Dining out/takeout:daily     Diet Recall:  Tea  Lunch  Dinner:    The following portions of the patient's history were reviewed and updated as appropriate: She  has a past medical history of Ankle fracture, Asthma, Cervical spinal stenosis, Closed left arm fracture, sequela, Concussion, Degeneration, intervertebral disc, cervical, Hypertension, Kidney stone, Migraine, Mixed hyperlipidemia (1/5/2021), Right arm fracture, Seasonal allergies, Shingles, and Vitamin D deficiency.  She   Patient Active Problem List    Diagnosis Date Noted    Class 2 severe obesity due to excess calories with serious comorbidity and body mass index (BMI) of 37.0 to 37.9 in adult (HCC) 07/23/2024    Hepatic steatosis 07/23/2024    Hypertension     Mild intermittent asthma     Prediabetes 02/16/2023    Abnormal PFT 05/05/2021    Shortness of breath 05/05/2021    Mixed hyperlipidemia 01/05/2021    Diarrhea 11/26/2018    Renal colic on left side 09/24/2018    Lumbar disc disease 08/28/2018    Lumbar facet joint pain 08/28/2018     She  has a past surgical history that includes Carpal tunnel release (Bilateral); Tubal ligation;  Sinus surgery; Mouth surgery; Cataract extraction (Left); and pr colonoscopy flx dx w/collj spec when pfrmd (N/A, 12/27/2018).  Her family history includes Heart disease in her father; Lung cancer in her paternal aunt; No Known Problems in her daughter, daughter, daughter, maternal grandfather, maternal grandmother, paternal grandfather, and paternal grandmother; Uterine cancer (age of onset: 75) in her maternal aunt.  She  reports that she has never smoked. She has never used smokeless tobacco. She reports current alcohol use. She reports that she does not use drugs.  Current Outpatient Medications   Medication Sig Dispense Refill    albuterol (PROVENTIL HFA,VENTOLIN HFA) 90 mcg/act inhaler Inhale 2 puffs if needed      amLODIPine-valsartan (EXFORGE) 5-160 MG per tablet Take 1 tablet by mouth daily      Ascorbic Acid (VITAMIN C) 100 MG tablet Take by mouth daily      Cholecalciferol (VITAMIN D3) 3000 units TABS Take 3,000 Units by mouth daily      Diclofenac Sodium (VOLTAREN) 1 % if needed      dicyclomine (BENTYL) 10 mg capsule TAKE 1 CAPSULE (10 MG TOTAL) BY MOUTH 3 (THREE) TIMES A DAY AS NEEDED (DIARRHEA AND ABDOMINAL PAIN) (Patient taking differently: Take 10 mg by mouth if needed (diarrhea and abdominal pain)) 90 capsule 0    DULoxetine (CYMBALTA) 60 mg delayed release capsule       famotidine (PEPCID) 10 mg tablet Take 10 mg by mouth 2 (two) times a day      fexofenadine (ALLEGRA) 180 MG tablet Take by mouth as needed      fluticasone (FLONASE) 50 mcg/act nasal spray SPRAY 2 SPRAYS INTO EACH NOSTRIL 2 TIMES A DAY (Patient taking differently: if needed) 96 mL 2    gabapentin (NEURONTIN) 100 mg capsule       ipratropium (ATROVENT) 0.06 % nasal spray SPRAY 2 SPRAYS INTO EACH NOSTRIL 3 TIMES A DAY. 45 mL 3    melatonin 3 mg Take 3 mg by mouth daily at bedtime      meloxicam (MOBIC) 15 mg tablet Take 15 mg by mouth daily      methocarbamol (ROBAXIN) 750 mg tablet Take 750 mg by mouth if needed      Omega-3 1000 MG  CAPS Take by mouth daily      tirzepatide (Zepbound) 2.5 mg/0.5 mL auto-injector Inject 0.5 mL (2.5 mg total) under the skin once a week for 28 days 2 mL 0    fluticasone-salmeterol (ADVAIR, WIXELA) 100-50 mcg/dose inhaler Inhale 1 puff 2 (two) times a day      sodium chloride (OCEAN) 0.65 % nasal spray 2 sprays into each nostril every 2 (two) hours while awake 30 mL 2     No current facility-administered medications for this visit.     Current Outpatient Medications on File Prior to Visit   Medication Sig    albuterol (PROVENTIL HFA,VENTOLIN HFA) 90 mcg/act inhaler Inhale 2 puffs if needed    amLODIPine-valsartan (EXFORGE) 5-160 MG per tablet Take 1 tablet by mouth daily    Ascorbic Acid (VITAMIN C) 100 MG tablet Take by mouth daily    Cholecalciferol (VITAMIN D3) 3000 units TABS Take 3,000 Units by mouth daily    Diclofenac Sodium (VOLTAREN) 1 % if needed    dicyclomine (BENTYL) 10 mg capsule TAKE 1 CAPSULE (10 MG TOTAL) BY MOUTH 3 (THREE) TIMES A DAY AS NEEDED (DIARRHEA AND ABDOMINAL PAIN) (Patient taking differently: Take 10 mg by mouth if needed (diarrhea and abdominal pain))    DULoxetine (CYMBALTA) 60 mg delayed release capsule     famotidine (PEPCID) 10 mg tablet Take 10 mg by mouth 2 (two) times a day    fexofenadine (ALLEGRA) 180 MG tablet Take by mouth as needed    fluticasone (FLONASE) 50 mcg/act nasal spray SPRAY 2 SPRAYS INTO EACH NOSTRIL 2 TIMES A DAY (Patient taking differently: if needed)    gabapentin (NEURONTIN) 100 mg capsule     ipratropium (ATROVENT) 0.06 % nasal spray SPRAY 2 SPRAYS INTO EACH NOSTRIL 3 TIMES A DAY.    melatonin 3 mg Take 3 mg by mouth daily at bedtime    meloxicam (MOBIC) 15 mg tablet Take 15 mg by mouth daily    methocarbamol (ROBAXIN) 750 mg tablet Take 750 mg by mouth if needed    Omega-3 1000 MG CAPS Take by mouth daily    fluticasone-salmeterol (ADVAIR, WIXELA) 100-50 mcg/dose inhaler Inhale 1 puff 2 (two) times a day    sodium chloride (OCEAN) 0.65 % nasal spray 2 sprays  "into each nostril every 2 (two) hours while awake    [DISCONTINUED] dicyclomine (BENTYL) 10 mg capsule Take 1 capsule (10 mg total) by mouth 3 (three) times a day as needed (diarrhea and pain) (Patient not taking: Reported on 7/23/2024)    [DISCONTINUED] DULoxetine (CYMBALTA) 30 mg delayed release capsule Take 30 mg by mouth daily (Patient not taking: Reported on 7/23/2024)    [DISCONTINUED] omeprazole (PriLOSEC) 20 mg delayed release capsule Take 20 mg by mouth daily (Patient not taking: Reported on 7/23/2024)     No current facility-administered medications on file prior to visit.     She is allergic to amoxicillin-pot clavulanate and penicillins..    Review of Systems   Constitutional:  Positive for fatigue. Negative for fever.   Respiratory:  Negative for shortness of breath.    Cardiovascular:  Negative for chest pain and palpitations.   Gastrointestinal:  Negative for abdominal pain, constipation, diarrhea and vomiting.   Genitourinary:  Negative for difficulty urinating.   Skin:  Negative for rash.   Neurological:  Negative for headaches.       Objective:    /84   Pulse 95   Temp 97.6 °F (36.4 °C)   Resp 16   Ht 5' 5\" (1.651 m)   Wt 105 kg (231 lb 3.2 oz)   LMP 03/15/2019 (Exact Date)   BMI 38.47 kg/m²     Physical Exam  Vitals and nursing note reviewed.   Constitutional:       General: She is not in acute distress.     Appearance: She is well-developed. She is obese.   HENT:      Head: Normocephalic and atraumatic.   Eyes:      Conjunctiva/sclera: Conjunctivae normal.   Neck:      Thyroid: No thyromegaly.   Pulmonary:      Effort: Pulmonary effort is normal. No respiratory distress.   Skin:     Findings: No rash (visible).   Neurological:      Mental Status: She is alert and oriented to person, place, and time.   Psychiatric:         Mood and Affect: Mood normal.         Behavior: Behavior normal.        "

## 2024-07-24 ENCOUNTER — TELEPHONE (OUTPATIENT)
Dept: BARIATRICS | Facility: CLINIC | Age: 59
End: 2024-07-24

## 2024-07-24 NOTE — TELEPHONE ENCOUNTER
PA for Zepbound 2.5mg    Submitted via    [x]CMM-KEY [id number]  [][employer] On [employer]-Case ID #    []Faxed to plan   []Other website    []Phone call Case ID #      Office notes sent, clinical questions answered. Awaiting determination    Turnaround time for your insurance to make a decision on your Prior Authorization can take 7-21 business days.

## 2024-07-30 ENCOUNTER — CLINICAL SUPPORT (OUTPATIENT)
Dept: BARIATRICS | Facility: CLINIC | Age: 59
End: 2024-07-30

## 2024-07-30 VITALS — HEIGHT: 65 IN | WEIGHT: 228.3 LBS | BODY MASS INDEX: 38.04 KG/M2

## 2024-07-30 DIAGNOSIS — E66.01 CLASS 2 SEVERE OBESITY DUE TO EXCESS CALORIES WITH SERIOUS COMORBIDITY AND BODY MASS INDEX (BMI) OF 37.0 TO 37.9 IN ADULT (HCC): Primary | ICD-10-CM

## 2024-07-30 PROCEDURE — RECHECK

## 2024-07-30 PROCEDURE — WMPRO12

## 2024-07-30 NOTE — PROGRESS NOTES
Weight Management Medical Nutrition Assessment      Maria Luz presented for the New Start session with the [facility] Program.  Today's weight is 228.3   .  Per dietary recall patient consumes excess calories from empty calorie foods and sugary beverages. Maria Luz quick was prescribed Zepbound and will be starting in the next day(s). At this time she expresses that she knows what to work on such as less added sugars, less fast food intake, and increasing exercise. Most importantly, her water intake is ~2 cups per day. Menopause 5 years ago at ~53, as weight has been struggling since.     We developed and reviewed a low calorie meal plan of 8717-0916 calories per day with emphasis on fluid intake to start (estimated ~2300+ calories per day at this time/ consumption). Maria Luz should aim to consume 64 fl oz per day (8 cups). She may also start to work on swapping water for one sugary beverage a day to slowly eliminate these elements from her diet.     Completed a body composition using SECA scale and reviewed results with patient. She will follow up in 2 weeks to further discuss diet     Patient seen by Medical Provider in past 6 months:  yes  Requested to schedule appointment with Medical Provider: Yes      Anthropometric Measurements  Start Weight (#): 231  Current Weight (#): 228.3  TBW % Change from start weight:-  Ideal Body Weight (#):120  Goal Weight (#):140-150#; 200# Under   Highest:Current   Lowest:200     Weight Loss History  Previous weight loss attempts: Commercial Programs (Weight Watchers, [employer], etc.)  Self Created Diets (Portion Control, Healthy Food Choices, etc.)    Food and Nutrition Related History  Wake up: 515/530AM; 630/7; random    Bed Time:9/10PM     Food Recall  Breakfast:tea, banana or a muffin if I dont eat on the way to work may get eggs (scrambled), sausage, and orange juice;tea with sugar (2 tsp) and dariela      Snack:sometimes Miriam Nascimento cookies (portioned) 100 calorie  pack  Lunch:deli sandwich like ham and cheese with whole wheat bread LTMP, salad bar (field greens/paula, tomato, cucus, shredded cheddar, olives, cold pasta like macaroni salad, red beets, eggs, dressing (varies), sunflower seeds; pasta bar, sometimes its PB&J, iced tea (Pure Leaf)   Snack:Pepsi, Cheezeitz (portioned bag) OR potato chips (snack bag); sweet treat like an ice sandwich  Dinner:Diner, Chipotle, chick-alejandro-a ([place] toast, soup and salad bar, Grilled cheese with fries, gyro, pasta), chicken sandwich or 3 strip meal (sans fries)  Snack:-      Beverages: water, 1% milk, juice, regular soda, and coffee/tea  Volume of beverage intake: 0 water intake; however, fluid amount is appropriate     Weekends: Same  Cravings: Varies   Trouble area of day:Home     Frequency of Eating out: daily  Food restrictions:No   Cooking: self   Food Shopping: self    Physical Activity Intake  Activity:Biking  Frequency:rarely  Physical limitations/barriers to exercise: Hips, knees, back     Estimated Needs  Energy  SECA: BMR:1687      X 1.3 -1000 = 1193  [facility] Energy Needs: BMR :1615    1-2# loss weekly sedentary:  938-1438             1-2# loss weekly lightly active:5298-8779  Maintenance calories for sedentary activity level: 1938  Protein:73-92      (1.2-1.5g/kg IBW)  Fluid: 64     (35mL/kg IBW)    Nutrition Diagnosis  Yes;    Excessive energy intake  related to Food and nutrition related knowledge deficit concerning energy intake as evidenced by  BMI >25 for adults       Nutrition Intervention    Nutrition Prescription  Calories:5407-0437  Protein:73-92  Fluid:72    Meal Plan (Rico/Pro/Carb)  Breakfast: 400 rico (25g PRO)  Snack: 150-200 rico (10g PRO)  Lunch: 500 rico (30g PRO)  Snack: 150-200 rico (10g PRO)  Dinner: 500-600 rico (30g PRO)  Snack: -    Nutrition Education:    Healthy Core Manual  Calorie controlled menu  Lean protein food choices  Healthy snack options  Food journaling tips      Nutrition  Counseling:  Strategies: meal planning, portion sizes, healthy snack choices, hydration, fiber intake, protein intake, exercise, food journal      Monitoring and Evaluation:  Evaluation criteria:  Energy Intake  Meet protein needs  Maintain adequate hydration  Monitor weekly weight  Meal planning/preparation  Food journal   Decreased portions at mealtimes and snacks  Physical activity     Barriers to learning:none  Readiness to change: Preparation:  (Getting ready to change)   Comprehension: very good  Expected Compliance: very good

## 2024-07-31 DIAGNOSIS — Z12.11 SCREENING FOR COLON CANCER: ICD-10-CM

## 2024-08-01 RX ORDER — DICYCLOMINE HYDROCHLORIDE 10 MG/1
10 CAPSULE ORAL 3 TIMES DAILY PRN
Qty: 300 CAPSULE | Refills: 1 | Status: SHIPPED | OUTPATIENT
Start: 2024-08-01 | End: 2025-02-17

## 2024-08-08 ENCOUNTER — CLINICAL SUPPORT (OUTPATIENT)
Dept: BARIATRICS | Facility: CLINIC | Age: 59
End: 2024-08-08

## 2024-08-08 VITALS — WEIGHT: 226.6 LBS | HEIGHT: 66 IN | BODY MASS INDEX: 36.42 KG/M2

## 2024-08-08 DIAGNOSIS — R63.5 ABNORMAL WEIGHT GAIN: Primary | ICD-10-CM

## 2024-08-08 PROCEDURE — RECHECK

## 2024-08-13 ENCOUNTER — CLINICAL SUPPORT (OUTPATIENT)
Dept: BARIATRICS | Facility: CLINIC | Age: 59
End: 2024-08-13

## 2024-08-13 VITALS — WEIGHT: 222.1 LBS | BODY MASS INDEX: 37 KG/M2 | HEIGHT: 65 IN

## 2024-08-13 DIAGNOSIS — E66.9 CLASS 2 OBESITY WITHOUT SERIOUS COMORBIDITY WITH BODY MASS INDEX (BMI) OF 36.0 TO 36.9 IN ADULT: Primary | ICD-10-CM

## 2024-08-13 PROCEDURE — RECHECK

## 2024-08-13 NOTE — PROGRESS NOTES
Weight Management Medical Nutrition Assessment      Maria Luz presented for 2 week Healthy [facility] Program check in.  Today's weight is 222.1. Weight loss of 6.2# since last visit (3.1# per week).  Since our last visit, Maria Luz is adjusting to her medication which has significantly altered her appetite (reduced). At times she may feel she has to push nourishment; for example, having a spoonful of peanut butter for breakfast or before bed. At this time, it is important that Maria Luz prioritizes protein and forgo some of the usual low calorie options when hungry so she at least eats ENOUGH and enough overall protein.     She is in a calorie deficit based on her weight loss and dietary intake, below the recommended 7245-7649 calories per day. This is OK and we will monitor weight loss progress. At this time she is not exercising; however, I reminded Maria Luz that it is OK to give herself humera at this time. When going too far into a calorie deficit with movement and calorie reduction, we do not want to lower the metabolic rate and lose muscle mass.     Things Maria Luz can do to keep herself nourished are sufficient fluids, consuming ~95g protein per day, consuming a meal replacement beverage or bar to increase intake of micronutrients (gave samples of [employer] products) -- ex: Ensure Max Protein, Boost, etc. Can be used daily and can also help with intake of fluids.     Patient seen by Medical Provider in past 6 months:  yes  Requested to schedule appointment with Medical Provider: Yes      Anthropometric Measurements  Start Weight (#): 231 (with provider); 228# starting HC   Current Weight (#): 222.1  TBW % Change from start weight:-  Ideal Body Weight (#):120  Goal Weight (#):140-150#; 200# Under   Highest:Current   Lowest:200     Weight Loss History  Previous weight loss attempts: Commercial Programs (Weight Watchers, GlucoTec, etc.)  Self Created Diets (Portion Control, Healthy Food Choices, etc.)    Food and  Nutrition Related History  Wake up: 515/530AM; 630/7; random    Bed Time:9/10PM     Food Recall  Breakfast:tea, banana or a muffin if I dont eat on the way to work may get eggs (scrambled), sausage, and orange juice;tea with sugar (2 tsp) and dariela      Snack:sometimes Miriam Nascimento cookies (portioned) 100 calorie pack  Lunch:deli sandwich like ham and cheese with whole wheat bread LTMP, salad bar (field greens/paula, tomato, cucus, shredded cheddar, olives, cold pasta like macaroni salad, red beets, eggs, dressing (varies), sunflower seeds; pasta bar, sometimes its PB&J, iced tea (Pure Leaf)   Snack:Pepsi, Cheezeitz (portioned bag) OR potato chips (snack bag); sweet treat like an ice sandwich  Dinner:Diner, Chipotle, chick-alejandro-a (Beninese toast, soup and salad bar, Grilled cheese with fries, gyro, pasta), chicken sandwich or 3 strip meal (sans fries)  Snack:-      Beverages: water, 1% milk, juice, regular soda, and coffee/tea  Volume of beverage intake: 0 water intake; however, fluid amount is appropriate     Weekends: Same  Cravings: Varies   Trouble area of day:Home     Frequency of Eating out: daily  Food restrictions:No   Cooking: self   Food Shopping: self    Physical Activity Intake  Activity:Biking  Frequency:rarely  Physical limitations/barriers to exercise: Hips, knees, back     Estimated Needs  Energy  SECA: BMR:1687      X 1.3 -1000 = 1193  [facility] Energy Needs: BMR :1615    1-2# loss weekly sedentary:  938-1438             1-2# loss weekly lightly active:7232-0367  Maintenance calories for sedentary activity level: 1938  Protein:73-92      (1.2-1.5g/kg IBW)  Fluid: 64     (35mL/kg IBW)    Nutrition Diagnosis  Yes;    Excessive energy intake  related to Food and nutrition related knowledge deficit concerning energy intake as evidenced by  BMI >25 for adults       Nutrition Intervention    Nutrition Prescription  Calories:3411-7136  Protein:73-92  Fluid:72    Meal Plan (Rico/Pro/Carb)  Breakfast: 400  emanuel (25g PRO)  Snack: 150-200 emanuel (10g PRO)  Lunch: 500 emanuel (30g PRO)  Snack: 150-200 emanuel (10g PRO)  Dinner: 500-600 emanuel (30g PRO)  Snack: -    Nutrition Education:    Healthy Core Manual  Calorie controlled menu  Lean protein food choices  Healthy snack options  Food journaling tips      Nutrition Counseling:  Strategies: meal planning, portion sizes, healthy snack choices, hydration, fiber intake, protein intake, exercise, food journal      Monitoring and Evaluation:  Evaluation criteria:  Energy Intake  Meet protein needs  Maintain adequate hydration  Monitor weekly weight  Meal planning/preparation  Food journal   Decreased portions at mealtimes and snacks  Physical activity     Barriers to learning:none  Readiness to change: Action  Comprehension: very good  Expected Compliance: very good

## 2024-08-15 ENCOUNTER — CLINICAL SUPPORT (OUTPATIENT)
Dept: BARIATRICS | Facility: CLINIC | Age: 59
End: 2024-08-15

## 2024-08-15 VITALS — HEIGHT: 65 IN | WEIGHT: 222.4 LBS | BODY MASS INDEX: 37.05 KG/M2

## 2024-08-15 DIAGNOSIS — R63.5 ABNORMAL WEIGHT GAIN: Primary | ICD-10-CM

## 2024-08-15 PROCEDURE — RECHECK

## 2024-08-21 DIAGNOSIS — E66.01 CLASS 2 SEVERE OBESITY DUE TO EXCESS CALORIES WITH SERIOUS COMORBIDITY AND BODY MASS INDEX (BMI) OF 37.0 TO 37.9 IN ADULT (HCC): Primary | ICD-10-CM

## 2024-08-21 RX ORDER — TIRZEPATIDE 5 MG/.5ML
5 INJECTION, SOLUTION SUBCUTANEOUS WEEKLY
Qty: 2 ML | Refills: 1 | Status: SHIPPED | OUTPATIENT
Start: 2024-08-21 | End: 2024-10-16

## 2024-08-29 ENCOUNTER — CLINICAL SUPPORT (OUTPATIENT)
Dept: BARIATRICS | Facility: CLINIC | Age: 59
End: 2024-08-29

## 2024-08-29 VITALS — BODY MASS INDEX: 36.72 KG/M2 | HEIGHT: 65 IN | WEIGHT: 220.4 LBS

## 2024-08-29 DIAGNOSIS — R63.5 ABNORMAL WEIGHT GAIN: Primary | ICD-10-CM

## 2024-08-29 PROCEDURE — RECHECK

## 2024-09-05 ENCOUNTER — CLINICAL SUPPORT (OUTPATIENT)
Dept: BARIATRICS | Facility: CLINIC | Age: 59
End: 2024-09-05

## 2024-09-05 VITALS — BODY MASS INDEX: 36.35 KG/M2 | HEIGHT: 65 IN | WEIGHT: 218.2 LBS

## 2024-09-05 DIAGNOSIS — R63.5 ABNORMAL WEIGHT GAIN: Primary | ICD-10-CM

## 2024-09-05 PROCEDURE — RECHECK

## 2024-09-10 ENCOUNTER — CLINICAL SUPPORT (OUTPATIENT)
Dept: BARIATRICS | Facility: CLINIC | Age: 59
End: 2024-09-10

## 2024-09-10 VITALS — BODY MASS INDEX: 36.24 KG/M2 | WEIGHT: 217.8 LBS

## 2024-09-10 DIAGNOSIS — R63.5 ABNORMAL WEIGHT GAIN: Primary | ICD-10-CM

## 2024-09-10 PROCEDURE — RECHECK

## 2024-09-12 ENCOUNTER — APPOINTMENT (EMERGENCY)
Dept: CT IMAGING | Facility: HOSPITAL | Age: 59
DRG: 854 | End: 2024-09-12
Payer: COMMERCIAL

## 2024-09-12 ENCOUNTER — APPOINTMENT (EMERGENCY)
Dept: ULTRASOUND IMAGING | Facility: HOSPITAL | Age: 59
DRG: 854 | End: 2024-09-12
Payer: COMMERCIAL

## 2024-09-12 ENCOUNTER — HOSPITAL ENCOUNTER (INPATIENT)
Facility: HOSPITAL | Age: 59
LOS: 3 days | Discharge: HOME | DRG: 854 | End: 2024-09-15
Attending: EMERGENCY MEDICINE | Admitting: INTERNAL MEDICINE
Payer: COMMERCIAL

## 2024-09-12 DIAGNOSIS — R93.89 ABNORMAL ULTRASOUND OF PELVIS: ICD-10-CM

## 2024-09-12 DIAGNOSIS — N83.202 CYST OF LEFT OVARY: ICD-10-CM

## 2024-09-12 DIAGNOSIS — N20.9 UROLITHIASIS: ICD-10-CM

## 2024-09-12 DIAGNOSIS — M54.50 LOW BACK PAIN: Primary | ICD-10-CM

## 2024-09-12 DIAGNOSIS — N12 PYELONEPHRITIS: ICD-10-CM

## 2024-09-12 DIAGNOSIS — N20.1 LEFT URETERAL CALCULUS: ICD-10-CM

## 2024-09-12 LAB
ALBUMIN SERPL BCG-MCNC: 4.2 G/DL (ref 3.5–5)
ALP SERPL-CCNC: 35 U/L (ref 34–104)
ALT SERPL W P-5'-P-CCNC: 23 U/L (ref 7–52)
ANION GAP SERPL CALCULATED.3IONS-SCNC: 10 MMOL/L (ref 4–13)
AST SERPL W P-5'-P-CCNC: 17 U/L (ref 13–39)
BACTERIA UR QL AUTO: ABNORMAL /HPF
BASOPHILS # BLD AUTO: 0.07 THOUSANDS/ΜL (ref 0–0.1)
BASOPHILS NFR BLD AUTO: 1 % (ref 0–1)
BILIRUB SERPL-MCNC: 0.68 MG/DL (ref 0.2–1)
BILIRUB UR QL STRIP: NEGATIVE
BUN SERPL-MCNC: 18 MG/DL (ref 5–25)
CALCIUM SERPL-MCNC: 9.5 MG/DL (ref 8.4–10.2)
CHLORIDE SERPL-SCNC: 101 MMOL/L (ref 96–108)
CLARITY UR: ABNORMAL
CO2 SERPL-SCNC: 23 MMOL/L (ref 21–32)
COLOR UR: YELLOW
CREAT SERPL-MCNC: 1.26 MG/DL (ref 0.6–1.3)
EOSINOPHIL # BLD AUTO: 0.01 THOUSAND/ΜL (ref 0–0.61)
EOSINOPHIL NFR BLD AUTO: 0 % (ref 0–6)
ERYTHROCYTE [DISTWIDTH] IN BLOOD BY AUTOMATED COUNT: 12.7 % (ref 11.6–15.1)
GFR SERPL CREATININE-BSD FRML MDRD: 46 ML/MIN/1.73SQ M
GLUCOSE SERPL-MCNC: 95 MG/DL (ref 65–140)
GLUCOSE UR STRIP-MCNC: NEGATIVE MG/DL
HCT VFR BLD AUTO: 41.1 % (ref 34.8–46.1)
HGB BLD-MCNC: 13.9 G/DL (ref 11.5–15.4)
HGB UR QL STRIP.AUTO: ABNORMAL
HYALINE CASTS #/AREA URNS LPF: ABNORMAL /LPF
IMM GRANULOCYTES # BLD AUTO: 0.02 THOUSAND/UL (ref 0–0.2)
IMM GRANULOCYTES NFR BLD AUTO: 0 % (ref 0–2)
KETONES UR STRIP-MCNC: ABNORMAL MG/DL
LACTATE SERPL-SCNC: 0.6 MMOL/L (ref 0.5–2)
LEUKOCYTE ESTERASE UR QL STRIP: ABNORMAL
LYMPHOCYTES # BLD AUTO: 1.77 THOUSANDS/ΜL (ref 0.6–4.47)
LYMPHOCYTES NFR BLD AUTO: 15 % (ref 14–44)
MCH RBC QN AUTO: 31.2 PG (ref 26.8–34.3)
MCHC RBC AUTO-ENTMCNC: 33.8 G/DL (ref 31.4–37.4)
MCV RBC AUTO: 92 FL (ref 82–98)
MONOCYTES # BLD AUTO: 1.65 THOUSAND/ΜL (ref 0.17–1.22)
MONOCYTES NFR BLD AUTO: 14 % (ref 4–12)
MUCOUS THREADS UR QL AUTO: ABNORMAL
NEUTROPHILS # BLD AUTO: 8.69 THOUSANDS/ΜL (ref 1.85–7.62)
NEUTS SEG NFR BLD AUTO: 70 % (ref 43–75)
NITRITE UR QL STRIP: POSITIVE
NON-SQ EPI CELLS URNS QL MICRO: ABNORMAL /HPF
NRBC BLD AUTO-RTO: 0 /100 WBCS
PH UR STRIP.AUTO: 6 [PH]
PLATELET # BLD AUTO: 249 THOUSANDS/UL (ref 149–390)
PMV BLD AUTO: 9.8 FL (ref 8.9–12.7)
POTASSIUM SERPL-SCNC: 4 MMOL/L (ref 3.5–5.3)
PROCALCITONIN SERPL-MCNC: 0.54 NG/ML
PROT SERPL-MCNC: 8.1 G/DL (ref 6.4–8.4)
PROT UR STRIP-MCNC: ABNORMAL MG/DL
RBC # BLD AUTO: 4.46 MILLION/UL (ref 3.81–5.12)
RBC #/AREA URNS AUTO: ABNORMAL /HPF
SODIUM SERPL-SCNC: 134 MMOL/L (ref 135–147)
SP GR UR STRIP.AUTO: 1.03 (ref 1–1.03)
TRANS CELLS #/AREA URNS HPF: PRESENT /[HPF]
UROBILINOGEN UR STRIP-ACNC: 2 MG/DL
WBC # BLD AUTO: 12.21 THOUSAND/UL (ref 4.31–10.16)
WBC #/AREA URNS AUTO: ABNORMAL /HPF

## 2024-09-12 PROCEDURE — 36415 COLL VENOUS BLD VENIPUNCTURE: CPT | Performed by: EMERGENCY MEDICINE

## 2024-09-12 PROCEDURE — 84145 PROCALCITONIN (PCT): CPT | Performed by: EMERGENCY MEDICINE

## 2024-09-12 PROCEDURE — 76856 US EXAM PELVIC COMPLETE: CPT

## 2024-09-12 PROCEDURE — 72131 CT LUMBAR SPINE W/O DYE: CPT

## 2024-09-12 PROCEDURE — 87040 BLOOD CULTURE FOR BACTERIA: CPT | Performed by: EMERGENCY MEDICINE

## 2024-09-12 PROCEDURE — 87086 URINE CULTURE/COLONY COUNT: CPT | Performed by: EMERGENCY MEDICINE

## 2024-09-12 PROCEDURE — 80053 COMPREHEN METABOLIC PANEL: CPT | Performed by: EMERGENCY MEDICINE

## 2024-09-12 PROCEDURE — 96374 THER/PROPH/DIAG INJ IV PUSH: CPT

## 2024-09-12 PROCEDURE — 72192 CT PELVIS W/O DYE: CPT

## 2024-09-12 PROCEDURE — 99291 CRITICAL CARE FIRST HOUR: CPT | Performed by: EMERGENCY MEDICINE

## 2024-09-12 PROCEDURE — 96361 HYDRATE IV INFUSION ADD-ON: CPT

## 2024-09-12 PROCEDURE — 99284 EMERGENCY DEPT VISIT MOD MDM: CPT

## 2024-09-12 PROCEDURE — 96375 TX/PRO/DX INJ NEW DRUG ADDON: CPT

## 2024-09-12 PROCEDURE — 81001 URINALYSIS AUTO W/SCOPE: CPT | Performed by: EMERGENCY MEDICINE

## 2024-09-12 PROCEDURE — 87077 CULTURE AEROBIC IDENTIFY: CPT | Performed by: EMERGENCY MEDICINE

## 2024-09-12 PROCEDURE — 76830 TRANSVAGINAL US NON-OB: CPT

## 2024-09-12 PROCEDURE — 83605 ASSAY OF LACTIC ACID: CPT | Performed by: EMERGENCY MEDICINE

## 2024-09-12 PROCEDURE — 87186 SC STD MICRODIL/AGAR DIL: CPT | Performed by: EMERGENCY MEDICINE

## 2024-09-12 PROCEDURE — 85025 COMPLETE CBC W/AUTO DIFF WBC: CPT | Performed by: EMERGENCY MEDICINE

## 2024-09-12 RX ORDER — KETOROLAC TROMETHAMINE 30 MG/ML
30 INJECTION, SOLUTION INTRAMUSCULAR; INTRAVENOUS ONCE
Status: COMPLETED | OUTPATIENT
Start: 2024-09-12 | End: 2024-09-12

## 2024-09-12 RX ORDER — FAMOTIDINE 20 MG/1
10 TABLET, FILM COATED ORAL DAILY
Status: DISCONTINUED | OUTPATIENT
Start: 2024-09-13 | End: 2024-09-15 | Stop reason: HOSPADM

## 2024-09-12 RX ORDER — SODIUM CHLORIDE 9 MG/ML
125 INJECTION, SOLUTION INTRAVENOUS CONTINUOUS
Status: DISCONTINUED | OUTPATIENT
Start: 2024-09-12 | End: 2024-09-14

## 2024-09-12 RX ORDER — FAMOTIDINE 20 MG/1
10 TABLET, FILM COATED ORAL 2 TIMES DAILY
Status: DISCONTINUED | OUTPATIENT
Start: 2024-09-13 | End: 2024-09-12

## 2024-09-12 RX ORDER — FENTANYL CITRATE 50 UG/ML
50 INJECTION, SOLUTION INTRAMUSCULAR; INTRAVENOUS ONCE
Status: COMPLETED | OUTPATIENT
Start: 2024-09-12 | End: 2024-09-12

## 2024-09-12 RX ORDER — DEXAMETHASONE SODIUM PHOSPHATE 10 MG/ML
10 INJECTION, SOLUTION INTRAMUSCULAR; INTRAVENOUS ONCE
Status: COMPLETED | OUTPATIENT
Start: 2024-09-12 | End: 2024-09-12

## 2024-09-12 RX ORDER — LANOLIN ALCOHOL/MO/W.PET/CERES
6 CREAM (GRAM) TOPICAL
Status: COMPLETED | OUTPATIENT
Start: 2024-09-12 | End: 2024-09-12

## 2024-09-12 RX ORDER — ACETAMINOPHEN 10 MG/ML
1000 INJECTION, SOLUTION INTRAVENOUS ONCE
Status: COMPLETED | OUTPATIENT
Start: 2024-09-12 | End: 2024-09-12

## 2024-09-12 RX ORDER — ENOXAPARIN SODIUM 100 MG/ML
40 INJECTION SUBCUTANEOUS DAILY
Status: DISCONTINUED | OUTPATIENT
Start: 2024-09-13 | End: 2024-09-15 | Stop reason: HOSPADM

## 2024-09-12 RX ORDER — GABAPENTIN 100 MG/1
200 CAPSULE ORAL
Status: DISCONTINUED | OUTPATIENT
Start: 2024-09-12 | End: 2024-09-15 | Stop reason: HOSPADM

## 2024-09-12 RX ORDER — LOSARTAN POTASSIUM 25 MG/1
25 TABLET ORAL DAILY
Status: DISCONTINUED | OUTPATIENT
Start: 2024-09-13 | End: 2024-09-15 | Stop reason: HOSPADM

## 2024-09-12 RX ORDER — VALSARTAN 160 MG/1
160 TABLET ORAL DAILY
COMMUNITY
Start: 2024-09-10 | End: 2025-09-10

## 2024-09-12 RX ORDER — DIAZEPAM 5 MG
5 TABLET ORAL ONCE
Status: COMPLETED | OUTPATIENT
Start: 2024-09-12 | End: 2024-09-12

## 2024-09-12 RX ORDER — ACETAMINOPHEN 325 MG/1
650 TABLET ORAL EVERY 6 HOURS PRN
Status: DISCONTINUED | OUTPATIENT
Start: 2024-09-12 | End: 2024-09-15 | Stop reason: HOSPADM

## 2024-09-12 RX ORDER — DULOXETIN HYDROCHLORIDE 60 MG/1
60 CAPSULE, DELAYED RELEASE ORAL DAILY
Status: DISCONTINUED | OUTPATIENT
Start: 2024-09-13 | End: 2024-09-15 | Stop reason: HOSPADM

## 2024-09-12 RX ADMIN — SODIUM CHLORIDE 1000 ML: 0.9 INJECTION, SOLUTION INTRAVENOUS at 18:06

## 2024-09-12 RX ADMIN — ACETAMINOPHEN 1000 MG: 10 INJECTION INTRAVENOUS at 18:06

## 2024-09-12 RX ADMIN — FENTANYL CITRATE 50 MCG: 50 INJECTION INTRAMUSCULAR; INTRAVENOUS at 18:06

## 2024-09-12 RX ADMIN — GABAPENTIN 200 MG: 100 CAPSULE ORAL at 23:21

## 2024-09-12 RX ADMIN — DIAZEPAM 5 MG: 5 TABLET ORAL at 18:06

## 2024-09-12 RX ADMIN — KETOROLAC TROMETHAMINE 30 MG: 30 INJECTION, SOLUTION INTRAMUSCULAR; INTRAVENOUS at 18:04

## 2024-09-12 RX ADMIN — SODIUM CHLORIDE 125 ML/HR: 0.9 INJECTION, SOLUTION INTRAVENOUS at 23:30

## 2024-09-12 RX ADMIN — Medication 6 MG: at 23:21

## 2024-09-12 RX ADMIN — DEXTROSE 1000 MG: 50 INJECTION, SOLUTION INTRAVENOUS at 21:46

## 2024-09-12 RX ADMIN — SODIUM CHLORIDE 1000 ML: 0.9 INJECTION, SOLUTION INTRAVENOUS at 21:42

## 2024-09-12 RX ADMIN — DEXAMETHASONE SODIUM PHOSPHATE 10 MG: 10 INJECTION INTRAMUSCULAR; INTRAVENOUS at 18:05

## 2024-09-12 NOTE — LETTER
[place] [id number]  [address] [facility] [address]  Dept: [phone]    September 15, 2024     Patient: Maria Luz Ponce   YOB: 1965   Date of Visit: 9/12/2024       To Whom it May Concern:    Maria Luz Ponce is under my professional care. She was seen in the hospital from 9/12/2024 to 09/15/24. She may return to work on 9/23/24 without limitations.    If you have any questions or concerns, please don't hesitate to call.         Sincerely,          Sorin Santos PA-C

## 2024-09-12 NOTE — ED PROVIDER NOTES
1. Low back pain    2. Left ureteral calculus    3. Cyst of left ovary    4. Abnormal ultrasound of pelvis    5. Pyelonephritis    6. Urolithiasis      ED Disposition       ED Disposition   Admit    Condition   Stable    Date/Time   Thu Sep 12, 2024  8:43 PM    Comment   Case was discussed with   and the patient's admission status was agreed to be Admission Status: inpatient status to the service of    .               Assessment & Plan       Medical Decision Making  DDx including but not limited to: sciatica, herniated disc, arthritis, spinal stenosis, strain, sprain, fracture, cauda equina syndrome, epidural abscess, AAA.     Patient meets Back pain low risk criteria, no trauma, no fever chills or weight loss, no neurological deficit, no history of cancer, no history of injecting drugs, pain improved with rest.    Based on history and physical concerning for musculoskeletal opponent versus sacroiliitis versus herniated disc versus UTI versus pyelonephritis versus fracture versus did consider and unlikely cauda equina    Problems Addressed:  Abnormal ultrasound of pelvis: acute illness or injury  Cyst of left ovary: chronic illness or injury  Low back pain: acute illness or injury  Pyelonephritis: acute illness or injury    Amount and/or Complexity of Data Reviewed  Independent Historian: spouse     Details:   Spouse at bedside      External Data Reviewed: notes.     Details:   Patient's last MRI of the lumbar spine in May 2021 showed progression of degenerative disc changes at L5-S1 with disc space narrowing and osteophytic complex.  There is noted disc bulge L3-L4      Labs: ordered. Decision-making details documented in ED Course.     Details:   Mild leukocytosis without any bands.  Lactic acid within normal range  Procalcitonin mildly elevated  No acute kidney injury or electrolyte dysfunction except for sodium 134 and replaced IV fluids  Patient's urine with noted blood, nitrites, leukocytes, innumerable  WBCs and bacteria as well as hyaline cast.       Radiology: ordered. Decision-making details documented in ED Course.     Details:   CT of lumbar spine interpreted by radiologist as Focal (0.4 cm) left UPJ calculus with mild left-sided hydronephrosis. Subcentimeter left renal cortical calcification is noted.        The lumbar vertebral body heights are maintained demonstrating no acute fracture or subluxation     Degenerative disc space narrowing at the L5-S1 level with mild to moderate bilateral neural foraminal narrowing with possible encroachment of the exiting L5 nerve roots. Correlate with radiculopathy symptoms.     Left adnexal cystic lesion compatible with an ovarian cyst.             CT of pelvis interpreted by radiologist as 1.  No identifiable acute abnormality to account for the patient's clinical presentation.  2.  There is a 4.6 cm left ovarian/adnexal cyst. Nonemergent follow-up pelvic ultrasound is recommended for further evaluation.      Ultrasound of pelvis interpreted by radiologist as 1.  No convincing acute abnormality. No definitive evidence of acute torsion.  2.  There is a 3.9 cm, unilocular left ovarian cyst, with at least 2 avascular papillary projections. O-RADS 4 = Intermediate risk.  Imaging options include evaluation by ultrsaound specialist, MRI, or per GYN-oncologist protocol. Clinical management by   gynecologist with GYN-oncologist consultation or soley by GYN-oncologist.  Discussion of management or test interpretation with external provider(s): Urology        Risk  Prescription drug management.  Decision regarding hospitalization.          ED Course as of 09/12/24 223   Thu Sep 12, 2024   0390 Patient is a 59-year-old female coming in today with back pain that is progressively worsening.  On exam she is resting in bed in no distress.  Hemodynamically stable.  She does have reproducible tenderness along the bilateral iliac crest rating into the anterior pelvic crest.  Neuro intact  "with no focal deficits.  Will check CT pelvis and lumbar spine as well as UA and PVR.  Will check CBC and CMP as well.  She does have a temperature of 99 and tachycardic however patient appears in pain.  Patient does not appear septic at this time.  She has no obvious source of infection.      Disclosure: Voice to text software was used in the preparation of this document and could have resulted in translational errors.      Occasional wrong word or \"sound a like\" substitutions may have occurred due to the inherent limitations of voice recognition software.  Read the chart carefully and recognize, using context, where substitutions have occurred.       I have independently reviewed external records are available to me to the level of detail possible within the time constraints of my patient care responsibilities in the ED.       1848   Patient with mild leukocytosis without any bands.  Pending urine and CT read.  Patient CMP stable.           1930   Patient and  updated on CT findings.  They are also aware of pending ultrasound completed of the pelvis given her ovarian cyst.      Reviewed chart with patient and .  Patient in 2018 had a similar ovarian cyst as well as urolithiasis on the left at 0.4 cm.  States that she has had kidney stones in the past but was given Flomax for the but never had any issues.  She has never had any interventions for kidney stones in the past    Patient  is agreeable for ultrasound to rule out torsion of the ovarian cyst.     Will reach out to urology given her urolithiasis      Patient is resting in bed and states that her pain is markedly improved.  She is hemodynamically stable.  Vital signs improving.  She is nonperitoneal and nonseptic appearing         1939   PVR at 23 cc.  Unlikely cauda equina.  Given patient's presentation there is further concern for possible UTI versus Pyelo versus infected stone.  Also concern for ovarian torsion           1955   .  Discussed " with urology PA - Keysha Cárdenas -agree on pending urine.  She is aware of temperature patient's presentation including past CT showing urolithiasis.  Patient may need antibiotics depending on urine and follow-up with urology         2012   Patient's urine does appear infected.  No old urine culture here at [facility].  Will rediscussed with urology as there is concern if she had chills with a white count, to treat her as a Pyelonephritis.  Will send urine culture as well.       2022   Continue discussion with urology given back pain, chills, temperature of 99 with leukocytosis and UTI with concern for this obstructive stone that is been present.     Will plan for admission will discuss  with patient we will give antibiotics and possible stent tomorrow as well as n.p.o. after midnight       2041   Patient has ultrasound results.  Patient I had a lengthy discussion regarding her workup, CT findings, urine , and my discussion with urology.  Will add on lactic acid as well as procalcitonin.  Will continue with IV antibiotics, and plan for medical admission.    Patient's ultrasound did result and will need GYN consult while in hospital    Patient initially did not present septic as she was having exquisite low back pain and concern for lumbar radiculopathy versus worsening disc injury.    Given patient's history and further criteria for sepsis continue septic workup at this time     2111   Spoke with Dr Hilario who accepts patient.  Aware of pending lactic and procalcitonin.           2124 Procalcitonin(!)    Procalcitonin  elevated.  Will add on blood cultures and discussed with staff.  Pending lactic acid as well.           Noted prior to patient going to floor blood cultures were also ordered.  Lactic acid within normal range    Medications   ceftriaxone (ROCEPHIN) 1 g/50 mL in dextrose IVPB (1,000 mg Intravenous New Bag 9/12/24 2146)   diazepam (VALIUM) tablet 5 mg (5 mg Oral Given 9/12/24 1806)   ketorolac  (TORADOL) injection 30 mg (30 mg Intravenous Given 9/12/24 1804)   dexamethasone (PF) (DECADRON) injection 10 mg (10 mg Intravenous Given 9/12/24 1805)   fentaNYL injection 50 mcg (50 mcg Intravenous Given 9/12/24 1806)   sodium chloride 0.9 % bolus 1,000 mL (0 mL Intravenous Stopped 9/12/24 2123)   acetaminophen (Ofirmev) injection 1,000 mg (0 mg Intravenous Stopped 9/12/24 1821)   sodium chloride 0.9 % bolus 1,000 mL (1,000 mL Intravenous New Bag 9/12/24 2142)       History of Present Illness       Patient is a 59-year-old female with a history of hepatic steatosis, prediabetes, hypertension and hyperlipidemia as well as patient reports of fibromyalgia coming in today with onset of nontraumatic back pain that started several days ago.  She reports that several days ago she got the Pneumovax and then went home.  She fell asleep on a recliner when she woke up she had back pain.  She has pain around her back and points to the iliac crest and wraps around to the anterior portion.  She has no fevers, nausea, vomiting, diarrhea.  Patient has had chills. She does have some discomfort with urination primarily at end of her stream.  She has not going any more frequently or having hematuria.  She has been taking over-the-counter medication as well as Robaxin without relief.  She denies any focal weakness and or paresthesias throughout the bilateral lower extremity.  She states that she has a history of bulging disks in her back but never had surgery but has had injections prior. She denies any urinary retention or saddle anesthesia.  No loss of bowel or bladder      History provided by:  Medical records, patient and spouse   used: No    Back Pain  Location:  Lumbar spine and sacro-iliac joint  Quality:  Aching, cramping, stabbing and stiffness  Pain severity:  Moderate  Onset quality:  Gradual  Timing:  Constant  Progression:  Unchanged  Chronicity:  New  Context: not emotional stress, not falling, not  jumping from heights, not lifting heavy objects, not MCA, not MVA, not occupational injury, not pedestrian accident, not physical stress, not recent illness, not recent injury and not twisting    Relieved by:  Nothing  Worsened by:  Sitting and movement  Ineffective treatments:  NSAIDs, OTC medications, ibuprofen, lying down and muscle relaxants  Associated symptoms: dysuria and headaches    Associated symptoms: no abdominal pain, no abdominal swelling, no bladder incontinence, no bowel incontinence, no chest pain, no fever, no leg pain, no numbness, no paresthesias, no pelvic pain, no perianal numbness, no tingling, no weakness and no weight loss    Risk factors: obesity    Risk factors: no hx of cancer, no hx of osteoporosis, no lack of exercise, no menopause, no recent surgery, no steroid use and no vascular disease          Review of Systems   Constitutional:  Positive for chills. Negative for fever and weight loss.   HENT: Negative.  Negative for ear pain and sore throat.    Eyes: Negative.  Negative for pain and visual disturbance.   Respiratory: Negative.  Negative for cough and shortness of breath.    Cardiovascular: Negative.  Negative for chest pain and palpitations.   Gastrointestinal: Negative.  Negative for abdominal pain, bowel incontinence and vomiting.   Genitourinary:  Positive for dysuria. Negative for bladder incontinence, hematuria and pelvic pain.   Musculoskeletal:  Positive for back pain. Negative for arthralgias.   Skin: Negative.  Negative for color change and rash.   Neurological:  Positive for headaches. Negative for tingling, seizures, syncope, weakness, numbness and paresthesias.   Hematological: Negative.    Psychiatric/Behavioral: Negative.     All other systems reviewed and are negative.          Objective     ED Triage Vitals [09/12/24 1724]   Temperature Pulse Blood Pressure Respirations SpO2 Patient Position - Orthostatic VS   99.9 °F (37.7 °C) (!) 108 129/67 18 96 % Sitting       Temp Source Heart Rate Source BP Location FiO2 (%) Pain Score    Oral Monitor Right arm -- 8        Physical Exam  Vitals and nursing note reviewed.   Constitutional:       General: She is awake. She is not in acute distress.     Appearance: Normal appearance. She is well-developed and overweight.   HENT:      Head: Normocephalic and atraumatic.      Right Ear: External ear normal.      Left Ear: External ear normal.      Nose: Nose normal.      Mouth/Throat:      Mouth: Mucous membranes are moist.   Eyes:      General: Lids are normal. Gaze aligned appropriately.      Extraocular Movements: Extraocular movements intact.      Conjunctiva/sclera: Conjunctivae normal.      Pupils: Pupils are equal, round, and reactive to light.   Neck:      Trachea: Trachea and phonation normal.   Cardiovascular:      Rate and Rhythm: Regular rhythm. Tachycardia present.      Pulses:           Femoral pulses are 2+ on the right side and 2+ on the left side.       Popliteal pulses are 2+ on the right side and 2+ on the left side.        Dorsalis pedis pulses are 2+ on the right side and 2+ on the left side.      Heart sounds: Normal heart sounds, S1 normal and S2 normal. No murmur heard.  Pulmonary:      Effort: Pulmonary effort is normal. No respiratory distress.      Breath sounds: Normal breath sounds.   Abdominal:      General: Bowel sounds are normal.      Palpations: Abdomen is soft.      Tenderness: There is abdominal tenderness in the right lower quadrant, suprapubic area and left lower quadrant. There is no right CVA tenderness, left CVA tenderness, guarding or rebound. Negative signs include Cano's sign, Rovsing's sign and McBurney's sign.       Musculoskeletal:         General: No swelling.      Cervical back: Normal range of motion and neck supple. No rigidity.        Back:       Comments: Patient has tenderness along the posterior iliac crest along the anterior iliac crest as well.   Skin:     General: Skin is warm and  dry.      Capillary Refill: Capillary refill takes less than 2 seconds.   Neurological:      General: No focal deficit present.      Mental Status: She is alert and oriented to person, place, and time.      GCS: GCS eye subscore is 4. GCS verbal subscore is 5. GCS motor subscore is 6.      Cranial Nerves: Cranial nerves 2-12 are intact.      Sensory: Sensation is intact.      Motor: Motor function is intact.      Coordination: Coordination is intact.      Comments: No slurred speech, facial asymmetry, no tongue deviation.     Patient can move bilateral lower extremities spontaneously with full active range of motion throughout the bilateral hips, his knees and ankles.  Manual muscle grade 5 out of 5 throughout bilateral lower extremities   Psychiatric:         Mood and Affect: Mood normal.         Behavior: Behavior normal. Behavior is cooperative.         Labs Reviewed   URINALYSIS WITH REFLEX TO SCOPE - Abnormal       Result Value    Color, UA Yellow      Clarity, UA Turbid      Specific Gravity, UA 1.032 (*)     pH, UA 6.0      Leukocytes, UA Large (*)     Nitrite, UA Positive (*)     Protein, UA 70 (1+) (*)     Glucose, UA Negative      Ketones, UA 40 (2+) (*)     Urobilinogen, UA 2.0 (*)     Bilirubin, UA Negative      Occult Blood, UA Small (*)    CBC AND DIFFERENTIAL - Abnormal    WBC 12.21 (*)     RBC 4.46      Hemoglobin 13.9      Hematocrit 41.1      MCV 92      MCH 31.2      MCHC 33.8      RDW 12.7      MPV 9.8      Platelets 249      nRBC 0      Segmented % 70      Immature Grans % 0      Lymphocytes % 15      Monocytes % 14 (*)     Eosinophils Relative 0      Basophils Relative 1      Absolute Neutrophils 8.69 (*)     Absolute Immature Grans 0.02      Absolute Lymphocytes 1.77      Absolute Monocytes 1.65 (*)     Eosinophils Absolute 0.01      Basophils Absolute 0.07     COMPREHENSIVE METABOLIC PANEL - Abnormal    Sodium 134 (*)     Potassium 4.0      Chloride 101      CO2 23      ANION GAP 10      BUN  18      Creatinine 1.26      Glucose 95      Calcium 9.5      AST 17      ALT 23      Alkaline Phosphatase 35      Total Protein 8.1      Albumin 4.2      Total Bilirubin 0.68      eGFR 46      Narrative:     [employer] guidelines for Chronic Kidney Disease (CKD):     Stage 1 with normal or high GFR (GFR > 90 mL/min/1.73 square meters)    Stage 2 Mild CKD (GFR = 60-89 mL/min/1.73 square meters)    Stage 3A Moderate CKD (GFR = 45-59 mL/min/1.73 square meters)    Stage 3B Moderate CKD (GFR = 30-44 mL/min/1.73 square meters)    Stage 4 Severe CKD (GFR = 15-29 mL/min/1.73 square meters)    Stage 5 End Stage CKD (GFR <15 mL/min/1.73 square meters)  Note: GFR calculation is accurate only with a steady state creatinine   URINE MICROSCOPIC - Abnormal    RBC, UA 20-30 (*)     WBC, UA Innumerable (*)     Epithelial Cells Occasional      Bacteria, UA Innumerable (*)     MUCUS THREADS Moderate (*)     Hyaline Casts, UA 25-50 (*)     Transitional Epithelial Cells Present     PROCALCITONIN TEST - Abnormal    Procalcitonin 0.54 (*)    LACTIC ACID, PLASMA (W/REFLEX IF RESULT > 2.0) - Normal    LACTIC ACID 0.6      Narrative:     Result may be elevated if tourniquet was used during collection.   URINE CULTURE   BLOOD CULTURE   BLOOD CULTURE   B-TYPE NATRIURETIC PEPTIDE (BNP)   PLATELET COUNT     US pelvis complete w transvaginal   Final Interpretation by Georges Young MD (09/12 2034)      1.  No convincing acute abnormality. No definitive evidence of acute torsion.   2.  There is a 3.9 cm, unilocular left ovarian cyst, with at least 2 avascular papillary projections. O-RADS 4 = Intermediate risk.  Imaging options include evaluation by ultrsaound specialist, MRI, or per GYN-oncologist protocol. Clinical management by    gynecologist with GYN-oncologist consultation or soley by GYN-oncologist.                     Workstation performed: [id number]         CT spine lumbar without contrast   Final  Interpretation by Ranulfo Bonilla MD (09/12 1924)   Focal (0.4 cm) left UPJ calculus with mild left-sided hydronephrosis. Subcentimeter left renal cortical calcification is noted.         The lumbar vertebral body heights are maintained demonstrating no acute fracture or subluxation      Degenerative disc space narrowing at the L5-S1 level with mild to moderate bilateral neural foraminal narrowing with possible encroachment of the exiting L5 nerve roots. Correlate with radiculopathy symptoms.      Left adnexal cystic lesion compatible with an ovarian cyst.      The study was marked in EPIC for immediate notification.      Workstation performed: [id number]         CT pelvis wo contrast   Final Interpretation by Georges Young MD (09/12 1921)      1.  No identifiable acute abnormality to account for the patient's clinical presentation.   2.  There is a 4.6 cm left ovarian/adnexal cyst. Nonemergent follow-up pelvic ultrasound is recommended for further evaluation.      The study was marked in EPIC for significant notification.      Workstation performed: [id number]             CriticalCare Time    Date/Time: 9/12/2024 10:39 PM    Performed by: Ana Maria Berry DO  Authorized by: Ana Maria Berry DO    Critical care provider statement:     Critical care time (minutes):  40    Critical care time was exclusive of:  Separately billable procedures and treating other patients    Critical care was necessary to treat or prevent imminent or life-threatening deterioration of the following conditions:  Sepsis    Critical care was time spent personally by me on the following activities:  Blood draw for specimens, obtaining history from patient or surrogate, development of treatment plan with patient or surrogate, discussions with consultants, discussions with primary provider, evaluation of patient's response to treatment, review of old charts, re-evaluation of patient's condition, ordering and review of radiographic  studies, ordering and review of laboratory studies, ordering and performing treatments and interventions and examination of patient         Ana Maria Berry,   09/12/24 2244       Ana Maria Berry,   09/12/24 2241

## 2024-09-13 ENCOUNTER — ANESTHESIA (INPATIENT)
Dept: PERIOP | Facility: HOSPITAL | Age: 59
DRG: 854 | End: 2024-09-13
Payer: COMMERCIAL

## 2024-09-13 ENCOUNTER — TELEPHONE (OUTPATIENT)
Dept: OTHER | Facility: HOSPITAL | Age: 59
End: 2024-09-13

## 2024-09-13 ENCOUNTER — ANESTHESIA EVENT (INPATIENT)
Dept: PERIOP | Facility: HOSPITAL | Age: 59
DRG: 854 | End: 2024-09-13
Payer: COMMERCIAL

## 2024-09-13 ENCOUNTER — APPOINTMENT (INPATIENT)
Dept: RADIOLOGY | Facility: HOSPITAL | Age: 59
DRG: 854 | End: 2024-09-13
Payer: COMMERCIAL

## 2024-09-13 PROBLEM — N20.1 LEFT URETERAL CALCULUS: Status: ACTIVE | Noted: 2024-09-13

## 2024-09-13 PROBLEM — K21.9 GERD (GASTROESOPHAGEAL REFLUX DISEASE): Status: ACTIVE | Noted: 2024-09-13

## 2024-09-13 PROBLEM — N20.0 NEPHROLITHIASIS: Status: ACTIVE | Noted: 2024-09-13

## 2024-09-13 PROBLEM — N83.202 CYST OF LEFT OVARY: Status: ACTIVE | Noted: 2024-09-13

## 2024-09-13 PROBLEM — R06.02 SHORTNESS OF BREATH: Status: RESOLVED | Noted: 2021-05-05 | Resolved: 2024-09-13

## 2024-09-13 LAB
ALBUMIN SERPL BCG-MCNC: 3.8 G/DL (ref 3.5–5)
ALP SERPL-CCNC: 28 U/L (ref 34–104)
ALT SERPL W P-5'-P-CCNC: 18 U/L (ref 7–52)
ANION GAP SERPL CALCULATED.3IONS-SCNC: 7 MMOL/L (ref 4–13)
AST SERPL W P-5'-P-CCNC: 13 U/L (ref 13–39)
BILIRUB SERPL-MCNC: 0.31 MG/DL (ref 0.2–1)
BNP SERPL-MCNC: 75 PG/ML (ref 0–100)
BUN SERPL-MCNC: 18 MG/DL (ref 5–25)
CALCIUM SERPL-MCNC: 8.7 MG/DL (ref 8.4–10.2)
CHLORIDE SERPL-SCNC: 107 MMOL/L (ref 96–108)
CO2 SERPL-SCNC: 23 MMOL/L (ref 21–32)
CREAT SERPL-MCNC: 0.91 MG/DL (ref 0.6–1.3)
GFR SERPL CREATININE-BSD FRML MDRD: 69 ML/MIN/1.73SQ M
GLUCOSE SERPL-MCNC: 153 MG/DL (ref 65–140)
MAGNESIUM SERPL-MCNC: 2.5 MG/DL (ref 1.9–2.7)
PHOSPHATE SERPL-MCNC: 1.4 MG/DL (ref 2.7–4.5)
PLATELET # BLD AUTO: 223 THOUSANDS/UL (ref 149–390)
PMV BLD AUTO: 10.1 FL (ref 8.9–12.7)
POTASSIUM SERPL-SCNC: 4.2 MMOL/L (ref 3.5–5.3)
PROT SERPL-MCNC: 7.2 G/DL (ref 6.4–8.4)
SODIUM SERPL-SCNC: 137 MMOL/L (ref 135–147)

## 2024-09-13 PROCEDURE — 87077 CULTURE AEROBIC IDENTIFY: CPT | Performed by: UROLOGY

## 2024-09-13 PROCEDURE — 87186 SC STD MICRODIL/AGAR DIL: CPT | Performed by: UROLOGY

## 2024-09-13 PROCEDURE — 99024 POSTOP FOLLOW-UP VISIT: CPT | Performed by: UROLOGY

## 2024-09-13 PROCEDURE — 83735 ASSAY OF MAGNESIUM: CPT | Performed by: INTERNAL MEDICINE

## 2024-09-13 PROCEDURE — 99223 1ST HOSP IP/OBS HIGH 75: CPT | Performed by: UROLOGY

## 2024-09-13 PROCEDURE — 99232 SBSQ HOSP IP/OBS MODERATE 35: CPT | Performed by: PHYSICIAN ASSISTANT

## 2024-09-13 PROCEDURE — C1769 GUIDE WIRE: HCPCS | Performed by: UROLOGY

## 2024-09-13 PROCEDURE — 80053 COMPREHEN METABOLIC PANEL: CPT | Performed by: INTERNAL MEDICINE

## 2024-09-13 PROCEDURE — 99223 1ST HOSP IP/OBS HIGH 75: CPT | Performed by: INTERNAL MEDICINE

## 2024-09-13 PROCEDURE — 0T778DZ DILATION OF LEFT URETER WITH INTRALUMINAL DEVICE, VIA NATURAL OR ARTIFICIAL OPENING ENDOSCOPIC: ICD-10-PCS | Performed by: UROLOGY

## 2024-09-13 PROCEDURE — C1758 CATHETER, URETERAL: HCPCS | Performed by: UROLOGY

## 2024-09-13 PROCEDURE — 52332 CYSTOSCOPY AND TREATMENT: CPT | Performed by: UROLOGY

## 2024-09-13 PROCEDURE — 87086 URINE CULTURE/COLONY COUNT: CPT | Performed by: UROLOGY

## 2024-09-13 PROCEDURE — 85049 AUTOMATED PLATELET COUNT: CPT | Performed by: INTERNAL MEDICINE

## 2024-09-13 PROCEDURE — 83880 ASSAY OF NATRIURETIC PEPTIDE: CPT | Performed by: INTERNAL MEDICINE

## 2024-09-13 PROCEDURE — 99254 IP/OBS CNSLTJ NEW/EST MOD 60: CPT | Performed by: OBSTETRICS & GYNECOLOGY

## 2024-09-13 PROCEDURE — 84100 ASSAY OF PHOSPHORUS: CPT | Performed by: INTERNAL MEDICINE

## 2024-09-13 PROCEDURE — C2625 STENT, NON-COR, TEM W/DEL SY: HCPCS | Performed by: UROLOGY

## 2024-09-13 PROCEDURE — 74420 UROGRAPHY RTRGR +-KUB: CPT

## 2024-09-13 DEVICE — INLAY OPTIMA URETERAL STENT W/O GUIDEWIRE
Type: IMPLANTABLE DEVICE | Site: URETER | Status: FUNCTIONAL
Brand: BARD® INLAY OPTIMA® URETERAL STENT

## 2024-09-13 RX ORDER — FENTANYL CITRATE 50 UG/ML
INJECTION, SOLUTION INTRAMUSCULAR; INTRAVENOUS AS NEEDED
Status: DISCONTINUED | OUTPATIENT
Start: 2024-09-13 | End: 2024-09-13

## 2024-09-13 RX ORDER — DEXAMETHASONE SODIUM PHOSPHATE 10 MG/ML
INJECTION, SOLUTION INTRAMUSCULAR; INTRAVENOUS AS NEEDED
Status: DISCONTINUED | OUTPATIENT
Start: 2024-09-13 | End: 2024-09-13

## 2024-09-13 RX ORDER — LANOLIN ALCOHOL/MO/W.PET/CERES
6 CREAM (GRAM) TOPICAL
Status: DISCONTINUED | OUTPATIENT
Start: 2024-09-13 | End: 2024-09-14

## 2024-09-13 RX ORDER — SODIUM CHLORIDE 9 MG/ML
125 INJECTION, SOLUTION INTRAVENOUS CONTINUOUS
Status: DISCONTINUED | OUTPATIENT
Start: 2024-09-13 | End: 2024-09-15 | Stop reason: HOSPADM

## 2024-09-13 RX ORDER — MIDAZOLAM HYDROCHLORIDE 2 MG/2ML
INJECTION, SOLUTION INTRAMUSCULAR; INTRAVENOUS AS NEEDED
Status: DISCONTINUED | OUTPATIENT
Start: 2024-09-13 | End: 2024-09-13

## 2024-09-13 RX ORDER — ONDANSETRON 2 MG/ML
INJECTION INTRAMUSCULAR; INTRAVENOUS AS NEEDED
Status: DISCONTINUED | OUTPATIENT
Start: 2024-09-13 | End: 2024-09-13

## 2024-09-13 RX ORDER — PHENYLEPHRINE HCL IN 0.9% NACL 1 MG/10 ML
SYRINGE (ML) INTRAVENOUS AS NEEDED
Status: DISCONTINUED | OUTPATIENT
Start: 2024-09-13 | End: 2024-09-13

## 2024-09-13 RX ORDER — PROPOFOL 10 MG/ML
INJECTION, EMULSION INTRAVENOUS AS NEEDED
Status: DISCONTINUED | OUTPATIENT
Start: 2024-09-13 | End: 2024-09-13

## 2024-09-13 RX ORDER — ONDANSETRON 2 MG/ML
4 INJECTION INTRAMUSCULAR; INTRAVENOUS ONCE AS NEEDED
Status: DISCONTINUED | OUTPATIENT
Start: 2024-09-13 | End: 2024-09-13 | Stop reason: HOSPADM

## 2024-09-13 RX ORDER — LIDOCAINE HYDROCHLORIDE 20 MG/ML
INJECTION, SOLUTION EPIDURAL; INFILTRATION; INTRACAUDAL; PERINEURAL AS NEEDED
Status: DISCONTINUED | OUTPATIENT
Start: 2024-09-13 | End: 2024-09-13

## 2024-09-13 RX ORDER — CEFAZOLIN SODIUM 1 G/3ML
INJECTION, POWDER, FOR SOLUTION INTRAMUSCULAR; INTRAVENOUS AS NEEDED
Status: DISCONTINUED | OUTPATIENT
Start: 2024-09-13 | End: 2024-09-13

## 2024-09-13 RX ORDER — FENTANYL CITRATE/PF 50 MCG/ML
25 SYRINGE (ML) INJECTION
Status: DISCONTINUED | OUTPATIENT
Start: 2024-09-13 | End: 2024-09-13 | Stop reason: HOSPADM

## 2024-09-13 RX ADMIN — FENTANYL CITRATE 50 MCG: 50 INJECTION, SOLUTION INTRAMUSCULAR; INTRAVENOUS at 18:10

## 2024-09-13 RX ADMIN — PROPOFOL 50 MG: 10 INJECTION, EMULSION INTRAVENOUS at 18:05

## 2024-09-13 RX ADMIN — ONDANSETRON 4 MG: 2 INJECTION INTRAMUSCULAR; INTRAVENOUS at 18:24

## 2024-09-13 RX ADMIN — GABAPENTIN 200 MG: 100 CAPSULE ORAL at 22:11

## 2024-09-13 RX ADMIN — LIDOCAINE HYDROCHLORIDE 100 MG: 20 INJECTION, SOLUTION EPIDURAL; INFILTRATION; INTRACAUDAL at 18:02

## 2024-09-13 RX ADMIN — ACETAMINOPHEN 650 MG: 325 TABLET ORAL at 11:15

## 2024-09-13 RX ADMIN — Medication 6 MG: at 22:49

## 2024-09-13 RX ADMIN — CEFAZOLIN 2000 MG: 1 INJECTION, POWDER, FOR SOLUTION INTRAMUSCULAR; INTRAVENOUS; PARENTERAL at 18:11

## 2024-09-13 RX ADMIN — PROPOFOL 150 MG: 10 INJECTION, EMULSION INTRAVENOUS at 18:02

## 2024-09-13 RX ADMIN — DEXAMETHASONE SODIUM PHOSPHATE 10 MG: 10 INJECTION INTRAMUSCULAR; INTRAVENOUS at 18:11

## 2024-09-13 RX ADMIN — MIDAZOLAM 2 MG: 1 INJECTION INTRAMUSCULAR; INTRAVENOUS at 17:57

## 2024-09-13 RX ADMIN — Medication 200 MCG: at 18:11

## 2024-09-13 RX ADMIN — DEXTROSE 1000 MG: 50 INJECTION, SOLUTION INTRAVENOUS at 20:35

## 2024-09-13 RX ADMIN — ACETAMINOPHEN 650 MG: 325 TABLET ORAL at 19:41

## 2024-09-13 RX ADMIN — POTASSIUM PHOSPHATE 21 MMOL: 236; 224 INJECTION, SOLUTION INTRAVENOUS at 20:33

## 2024-09-13 RX ADMIN — FENTANYL CITRATE 50 MCG: 50 INJECTION, SOLUTION INTRAMUSCULAR; INTRAVENOUS at 18:06

## 2024-09-13 RX ADMIN — SODIUM CHLORIDE 125 ML/HR: 0.9 INJECTION, SOLUTION INTRAVENOUS at 15:33

## 2024-09-13 RX ADMIN — Medication 200 MCG: at 18:02

## 2024-09-13 NOTE — PROGRESS NOTES
Progress Note - Hospitalist   Name: Maria Luz Ponce 59 y.o. female I MRN: [id number]  Unit/Bed#: OR POOL I Date of Admission: 2024   Date of Service: 2024 I Hospital Day: 1    Assessment & Plan  Nephrolithiasis  -CAT scan shows 4 mm left UPJ calculus with mild associated hydronephrosis  -Associated urinary tract infection urine positive  -Urology consulted.  Plan for cystoscopy with stent placement today  -As needed morphine for pain  -Continue IV antibiotics  -Sepsis with leukocytosis and tachycardia on arrival, secondary to infected kidney stone  -Follow urine culture    Hypertension  -Continue home dose of valsartan  Mixed hyperlipidemia  Not on statin  GERD (gastroesophageal reflux disease)  -Continue home dose of Pepcid    DVT prophylaxis Lovenox 40 a day  Continue home dose of gabapentin    VTE Pharmacologic Prophylaxis:   Moderate Risk (Score 3-4) - Pharmacological DVT Prophylaxis Ordered: enoxaparin (Lovenox).    Mobility:   Basic Mobility Inpatient Raw Score: 24  JH-HLM Goal: 8: Walk 250 feet or more  JH-HLM Achieved: 7: Walk 25 feet or more  JH-HLM Goal achieved. Continue to encourage appropriate mobility.    Patient Centered Rounds: I performed bedside rounds with nursing staff today.   Discussions with Specialists or Other Care Team Provider: Urology    Education and Discussions with Family / Patient: Patient declined call to .     Current Length of Stay: 1 day(s)  Current Patient Status: Inpatient   Certification Statement: The patient will continue to require additional inpatient hospital stay due to urologic intervention  Discharge Plan: Anticipate discharge tomorrow to home.    Code Status: Level 1 - Full Code    Subjective   No acute events.  Patient does report some intermittent bilateral flank pain, however mainly on the left.    Objective     Vitals:   Temp (24hrs), Av.2 °F (36.8 °C), Min:97.7 °F (36.5 °C), Max:99.9 °F (37.7 °C)    Temp:  [97.7 °F (36.5 °C)-99.9 °F  (37.7 °C)] 97.7 °F (36.5 °C)  HR:  [] 62  Resp:  [18-19] 18  BP: ()/(56-71) 97/56  SpO2:  [92 %-97 %] 92 %  Body mass index is 35.26 kg/m².     Input and Output Summary (last 24 hours):     Intake/Output Summary (Last 24 hours) at 9/13/2024 1514  Last data filed at 9/13/2024 0804  Gross per 24 hour   Intake 1100 ml   Output 600 ml   Net 500 ml       Physical Exam  Vitals and nursing note reviewed.   Constitutional:       General: She is not in acute distress.     Appearance: Normal appearance.   HENT:      Head: Normocephalic.      Nose: Nose normal.      Mouth/Throat:      Mouth: Mucous membranes are moist.      Pharynx: Oropharynx is clear.   Eyes:      Extraocular Movements: Extraocular movements intact.      Pupils: Pupils are equal, round, and reactive to light.   Cardiovascular:      Rate and Rhythm: Normal rate and regular rhythm.      Pulses: Normal pulses.      Heart sounds: Normal heart sounds.   Pulmonary:      Effort: Pulmonary effort is normal.      Breath sounds: Normal breath sounds. No wheezing, rhonchi or rales.   Abdominal:      General: Abdomen is flat. Bowel sounds are normal. There is no distension.      Palpations: Abdomen is soft.      Tenderness: There is no abdominal tenderness.   Musculoskeletal:      Cervical back: Normal range of motion.      Right lower leg: No edema.      Left lower leg: No edema.   Skin:     General: Skin is warm and dry.      Findings: No rash.   Neurological:      General: No focal deficit present.      Mental Status: She is alert and oriented to person, place, and time. Mental status is at baseline.      Cranial Nerves: No cranial nerve deficit.      Sensory: No sensory deficit.          Lines/Drains:  Lines/Drains/Airways       Active Status       None                            Lab Results: I have reviewed the following results:    Results from last 7 days   Lab Units 09/13/24  0435 09/12/24  1814   WBC Thousand/uL  --  12.21*   HEMOGLOBIN g/dL  --   13.9   HEMATOCRIT %  --  41.1   PLATELETS Thousands/uL 223 249   SEGS PCT %  --  70   LYMPHO PCT %  --  15   MONO PCT %  --  14*   EOS PCT %  --  0     Results from last 7 days   Lab Units 09/13/24  0435   SODIUM mmol/L 137   POTASSIUM mmol/L 4.2   CHLORIDE mmol/L 107   CO2 mmol/L 23   BUN mg/dL 18   CREATININE mg/dL 0.91   ANION GAP mmol/L 7   CALCIUM mg/dL 8.7   ALBUMIN g/dL 3.8   TOTAL BILIRUBIN mg/dL 0.31   ALK PHOS U/L 28*   ALT U/L 18   AST U/L 13   GLUCOSE RANDOM mg/dL 153*                 Results from last 7 days   Lab Units 09/12/24 2138 09/12/24  1814   LACTIC ACID mmol/L 0.6  --    PROCALCITONIN ng/ml  --  0.54*       Recent Cultures (last 7 days):   Results from last 7 days   Lab Units 09/12/24 2138 09/12/24  2135   BLOOD CULTURE  Received in Microbiology Lab. Culture in Progress. Received in Microbiology Lab. Culture in Progress.       Imaging Review: No pertinent imaging studies reviewed.  Other Studies: No additional pertinent studies reviewed.    Last 24 Hours Medication List:     Current Facility-Administered Medications:     [Transfer Hold] acetaminophen (TYLENOL) tablet 650 mg, Q6H PRN    [Transfer Hold] ceftriaxone (ROCEPHIN) 1 g/50 mL in dextrose IVPB, Q24H, Last Rate: 1,000 mg (09/12/24 2146)    [Transfer Hold] cefTRIAXone (ROCEPHIN) 1,000 mg in dextrose 5 % 50 mL IVPB, Q24H    DULoxetine (CYMBALTA) delayed release capsule 60 mg, Daily    enoxaparin (LOVENOX) subcutaneous injection 40 mg, Daily    [Transfer Hold] famotidine (PEPCID) tablet 10 mg, Daily    gabapentin (NEURONTIN) capsule 200 mg, HS    losartan (COZAAR) tablet 25 mg, Daily    morphine injection 2 mg, Q6H PRN    sodium chloride 0.9 % infusion, Continuous, Last Rate: 125 mL/hr (09/12/24 2330)    Administrative Statements   Today, Patient Was Seen By: Sorin Santos PA-C      **Please Note: This note may have been constructed using a voice recognition system.**

## 2024-09-13 NOTE — PLAN OF CARE
Problem: PAIN - ADULT  Goal: Verbalizes/displays adequate comfort level or baseline comfort level  Description: Interventions:  - Encourage patient to monitor pain and request assistance  - Assess pain using appropriate pain scale  - Administer analgesics based on type and severity of pain and evaluate response  - Implement non-pharmacological measures as appropriate and evaluate response  - Consider cultural and social influences on pain and pain management  - Notify physician/advanced practitioner if interventions unsuccessful or patient reports new pain  Outcome: Progressing     Problem: INFECTION - ADULT  Goal: Absence or prevention of progression during hospitalization  Description: INTERVENTIONS:  - Assess and monitor for signs and symptoms of infection  - Monitor lab/diagnostic results  - Monitor all insertion sites, i.e. indwelling lines, tubes, and drains  - Monitor endotracheal if appropriate and nasal secretions for changes in amount and color  - [place] appropriate cooling/warming therapies per order  - Administer medications as ordered  - Instruct and encourage patient and family to use good hand hygiene technique  - Identify and instruct in appropriate isolation precautions for identified infection/condition  Outcome: Progressing     Problem: SAFETY ADULT  Goal: Patient will remain free of falls  Description: INTERVENTIONS:  - Educate patient/family on patient safety including physical limitations  - Instruct patient to call for assistance with activity   - Consult OT/PT to assist with strengthening/mobility   - Keep Call bell within reach  - Keep bed low and locked with side rails adjusted as appropriate  - Keep care items and personal belongings within reach  - Initiate and maintain comfort rounds  - Make Fall Risk Sign visible to staff  - Apply yellow socks and bracelet for high fall risk patients  - Consider moving patient to room near nurses station  Outcome: Progressing  Goal: Maintain or  return to baseline ADL function  Description: INTERVENTIONS:  -  Assess patient's ability to carry out ADLs; assess patient's baseline for ADL function and identify physical deficits which impact ability to perform ADLs (bathing, care of mouth/teeth, toileting, grooming, dressing, etc.)  - Assess/evaluate cause of self-care deficits   - Assess range of motion  - Assess patient's mobility; develop plan if impaired  - Assess patient's need for assistive devices and provide as appropriate  - Encourage maximum independence but intervene and supervise when necessary  - Involve family in performance of ADLs  - Assess for home care needs following discharge   - Consider OT consult to assist with ADL evaluation and planning for discharge  - Provide patient education as appropriate  Outcome: Progressing     Problem: DISCHARGE PLANNING  Goal: Discharge to home or other facility with appropriate resources  Description: INTERVENTIONS:  - Identify barriers to discharge w/patient and caregiver  - Arrange for needed discharge resources and transportation as appropriate  - Identify discharge learning needs (meds, wound care, etc.)  - Arrange for interpretive services to assist at discharge as needed  - Refer to Case Management Department for coordinating discharge planning if the patient needs post-hospital services based on physician/advanced practitioner order or complex needs related to functional status, cognitive ability, or social support system  Outcome: Progressing     Problem: Knowledge Deficit  Goal: Patient/family/caregiver demonstrates understanding of disease process, treatment plan, medications, and discharge instructions  Description: Complete learning assessment and assess knowledge base.  Interventions:  - Provide teaching at level of understanding  - Provide teaching via preferred learning methods  Outcome: Progressing     Problem: CARDIOVASCULAR - ADULT  Goal: Maintains optimal cardiac output and hemodynamic  stability  Description: INTERVENTIONS:  - Monitor I/O, vital signs and rhythm  - Monitor for S/S and trends of decreased cardiac output  - Administer and titrate ordered vasoactive medications to optimize hemodynamic stability  - Assess quality of pulses, skin color and temperature  - Assess for signs of decreased coronary artery perfusion  - Instruct patient to report change in severity of symptoms  Outcome: Progressing     Problem: RESPIRATORY - ADULT  Goal: Achieves optimal ventilation and oxygenation  Description: INTERVENTIONS:  - Assess for changes in respiratory status  - Assess for changes in mentation and behavior  - Position to facilitate oxygenation and minimize respiratory effort  - Oxygen administered by appropriate delivery if ordered  - Initiate smoking cessation education as indicated  - Encourage broncho-pulmonary hygiene including cough, deep breathe, Incentive Spirometry  - Assess the need for suctioning and aspirate as needed  - Assess and instruct to report SOB or any respiratory difficulty  - Respiratory Therapy support as indicated  Outcome: Progressing     Problem: GASTROINTESTINAL - ADULT  Goal: Minimal or absence of nausea and/or vomiting  Description: INTERVENTIONS:  - Administer IV fluids if ordered to ensure adequate hydration  - Maintain NPO status until nausea and vomiting are resolved  - Nasogastric tube if ordered  - Administer ordered antiemetic medications as needed  - Provide nonpharmacologic comfort measures as appropriate  - Advance diet as tolerated, if ordered  - Consider nutrition services referral to assist patient with adequate nutrition and appropriate food choices  Outcome: Progressing  Goal: Maintains or returns to baseline bowel function  Description: INTERVENTIONS:  - Assess bowel function  - Encourage oral fluids to ensure adequate hydration  - Administer IV fluids if ordered to ensure adequate hydration  - Administer ordered medications as needed  - Encourage  mobilization and activity  - Consider nutritional services referral to assist patient with adequate nutrition and appropriate food choices  Outcome: Progressing  Goal: Maintains adequate nutritional intake  Description: INTERVENTIONS:  - Monitor percentage of each meal consumed  - Identify factors contributing to decreased intake, treat as appropriate  - Assist with meals as needed  - Monitor I&O, weight, and lab values if indicated  - Obtain nutrition services referral as needed  Outcome: Progressing     Problem: GENITOURINARY - ADULT  Goal: Maintains or returns to baseline urinary function  Description: INTERVENTIONS:  - Assess urinary function  - Encourage oral fluids to ensure adequate hydration if ordered  - Administer IV fluids as ordered to ensure adequate hydration  - Administer ordered medications as needed  - Offer frequent toileting  - Follow urinary retention protocol if ordered  Outcome: Progressing  Goal: Absence of urinary retention  Description: INTERVENTIONS:  - Assess patient’s ability to void and empty bladder  - Monitor I/O  - Bladder scan as needed  - Discuss with physician/AP medications to alleviate retention as needed  - Discuss catheterization for long term situations as appropriate  Outcome: Progressing     Problem: METABOLIC, FLUID AND ELECTROLYTES - ADULT  Goal: Electrolytes maintained within normal limits  Description: INTERVENTIONS:  - Monitor labs and assess patient for signs and symptoms of electrolyte imbalances  - Administer electrolyte replacement as ordered  - Monitor response to electrolyte replacements, including repeat lab results as appropriate  - Instruct patient on fluid and nutrition as appropriate  Outcome: Progressing  Goal: Fluid balance maintained  Description: INTERVENTIONS:  - Monitor labs   - Monitor I/O and WT  - Instruct patient on fluid and nutrition as appropriate  - Assess for signs & symptoms of volume excess or deficit  Outcome: Progressing     Problem:  HEMATOLOGIC - ADULT  Goal: Maintains hematologic stability  Description: INTERVENTIONS  - Assess for signs and symptoms of bleeding or hemorrhage  - Monitor labs  - Administer supportive blood products/factors as ordered and appropriate  Outcome: Progressing

## 2024-09-13 NOTE — ASSESSMENT & PLAN NOTE
-CAT scan shows 4 mm left UPJ calculus with mild associated hydronephrosis  -Associated urinary tract infection urine positive  -Will place n.p.o.  -Urology spoken with possible intervention tomorrow secondary to symptoms  -As needed morphine for pain  -Placed on ceftriaxone 1 g IV daily  -Patient not overtly toxic creatinine normal white count mildly elevated at 12.2 procalcitonin elevated at 0.54

## 2024-09-13 NOTE — UTILIZATION REVIEW
Notification of Unplanned, Urgent, or   Emergency Inpatient Admission   AUTHORIZATION REQUEST   [place] [address]  Tax ID: [id number]  NPI: [id number]  Place of Service: Acute Care Hospital  Admission Level of Care: Inpatient  Place of Service Code: 21     Attending Physician Information  Attending Name and NPI#: Harrison Magdi Torres Do [[id number]]  Phone: [phone]     Admission Information  Inpatient Admission Date/Time: 9/12/24  9:12 PM  Discharge Date/Time: No discharge date for patient encounter.  Admitting Diagnosis Code/Description:  Low back pain [M54.50]  Back pain [M54.9]  Urolithiasis [N20.9]  Pyelonephritis [N12]  Cyst of left ovary [N83.202]  Left ureteral calculus [N20.1]  Abnormal ultrasound of pelvis [R93.89]     Utilization Review Contact  Nayana Patel, Utilization   Phone: [phone]  Fax: [phone]  Email: [email or web address]  Contact for approvals/pending authorizations, clinical reviews, and discharge.     Physician Advisory Services Contact  Medical Necessity Denial & [id number] Discussion  Phone: [phone]  Fax: [phone]  Email: [email or web address]     DISCHARGE SUPPORT TEAM:  For Patients Discharge Needs & Updates  Phone: [phone] opt. 2 Fax: [phone]  Email: [email or web address]

## 2024-09-13 NOTE — ASSESSMENT & PLAN NOTE
-CAT scan shows 4 mm left UPJ calculus with mild associated hydronephrosis  -Associated urinary tract infection urine positive  -Urology consulted.  Plan for cystoscopy with stent placement today  -As needed morphine for pain  -Continue IV antibiotics  -Sepsis with leukocytosis and tachycardia on arrival, secondary to infected kidney stone  -Follow urine culture

## 2024-09-13 NOTE — SEPSIS NOTE
Sepsis Note   Maria Luz Ponce 59 y.o. female MRN: [id number]  Unit/Bed#: ED-26 Encounter: [id number]       Initial Sepsis Screening       Row Name 09/12/24 2040                Is the patient's history suggestive of a new or worsening infection? Yes (Proceed)  -NB        Suspected source of infection suspect infection, source unknown  -NB        Indicate SIRS criteria Tachycardia > 90 bpm;Leukocytosis (WBC > 16682 IJL) OR Leukopenia (WBC <4000 IJL) OR Bandemia (WBC >10% bands)  -NB        Are two or more of the above signs & symptoms of infection both present and new to the patient? Yes (Proceed)  -NB        Assess for evidence of organ dysfunction: Are any of the below criteria present within 6 hours of suspected infection and SIRS criteria that are NOT considered to be chronic conditions? --                  User Key  (r) = Recorded By, (t) = Taken By, (c) = Cosigned By      Initials Name Provider Type    NB Ana Maria Berry DO Physician                  Patient with noted leukocytosis and tachycardia.  Vital signs improved and lactic acid without elevation.  No evidence of endorgan damage      Body mass index is 34.2 kg/m².  Wt Readings from Last 1 Encounters:   09/12/24 96.1 kg (211 lb 13.8 oz)     IBW (Ideal Body Weight): 59.3 kg    Ideal body weight: 59.3 kg (130 lb 11.7 oz)  Adjusted ideal body weight: 74 kg (163 lb 3 oz)

## 2024-09-13 NOTE — ANESTHESIA PREPROCEDURE EVALUATION
Procedure:  CYSTOSCOPY URETEROSCOPY WITH LITHOTRIPSY HOLMIUM LASER, RETROGRADE PYELOGRAM AND INSERTION STENT URETERAL (Left: Bladder)    Relevant Problems   ANESTHESIA (within normal limits)      CARDIO   (+) Hypertension   (+) Mixed hyperlipidemia      ENDO (within normal limits)      GI/HEPATIC   (+) GERD (gastroesophageal reflux disease) (Controlled on medication)   (+) Hepatic steatosis      /RENAL   (+) Nephrolithiasis      MUSCULOSKELETAL   (+) Lumbar facet joint pain      PULMONARY   (+) Mild intermittent asthma   (+) Shortness of breath (Resolved)        Physical Exam    Airway    Mallampati score: I  TM Distance: >3 FB  Neck ROM: full     Dental        Cardiovascular  Cardiovascular exam normal    Pulmonary  Pulmonary exam normal     Other Findings        Anesthesia Plan  ASA Score- 2 Emergent    Anesthesia Type- general with ASA Monitors.         Additional Monitors:     Airway Plan: LMA.           Plan Factors-    Chart reviewed.    Patient summary reviewed.                  Induction- intravenous.    Postoperative Plan-     Perioperative Resuscitation Plan - Level 1 - Full Code.       Informed Consent- Anesthetic plan and risks discussed with patient.

## 2024-09-13 NOTE — ANESTHESIA POSTPROCEDURE EVALUATION
Post-Op Assessment Note    CV Status:  Stable  Pain Score: 0    Pain management: adequate       Mental Status:  Alert and awake   Hydration Status:  Euvolemic   PONV Controlled:  Controlled   Airway Patency:  Patent and adequate     Post Op Vitals Reviewed: Yes    No anethesia notable event occurred.    Staff: Anesthesiologist, CRNA               BP   105/63   Temp   97.5   Pulse  68   Resp   18   SpO2   98

## 2024-09-13 NOTE — ASSESSMENT & PLAN NOTE
One prior episode of kidney stones -- passed independenlty without surgical intervention   Presented to [facility] ED 9/12 with worsening back pain  UA positive for infection, urine culture pending  WBC 12.2, procalcitonin 0.54  Creatinine stable  CT reveals 4 mm left UPJ stone with mild hydronephrosis  Initiated on Rocephin in ED  Admitted overnight for ongoing observation and MET  NPO since midnight   Pain ongoing intermittently - vitals remain stable   Case request placed for left ureteral stent insertion this afternoon by Dr. Johns  Patient is aware she will require second stage surgery for definitive stone treatment  Surgical consent signed and given to or holding  Proceed with surgical intervention at this time

## 2024-09-13 NOTE — H&P
Assessment & Plan  Nephrolithiasis  -CAT scan shows 4 mm left UPJ calculus with mild associated hydronephrosis  -Associated urinary tract infection urine positive  -Will place n.p.o.  -Urology spoken with possible intervention tomorrow secondary to symptoms  -As needed morphine for pain  -Placed on ceftriaxone 1 g IV daily  -Patient not overtly toxic creatinine normal white count mildly elevated at 12.2 procalcitonin elevated at 0.54  Hypertension  -Continue home dose of valsartan  Mixed hyperlipidemia    GERD (gastroesophageal reflux disease)  -Continue home dose of Pepcid    DVT prophylaxis Lovenox 40 a day  Continue home dose of gabapentin      Chief Complaint:       History of Present Illness:    Maria Luz Ponce is a 59 y.o. female who presents with patient comes in with a complaint of chills dysuria back pain.  She is a nurse in our system.  No overt subjective fever no diarrhea no vomiting no other constitutional symptoms.  In the ER she underwent a CT scan of the pelvis and lumbar spine as well as a pelvic ultrasound.  Was impressive for a 4 mm UVJ calculus.  Urology was spoken with recommended she be placed n.p.o. for possible intervention as she is symptomatic significantly.  Also found to have a UTI given ceftriaxone in the ER.  Resting in bed comfortably at this point pain controlled.  Creatinine normal  Review of Systems:    All other systems reviewed and negative    Past Medical and Surgical History:     Past Medical History:   Diagnosis Date    Ankle fracture     Asthma     Cervical spinal stenosis     Closed left arm fracture, sequela     Concussion     Degeneration, intervertebral disc, cervical     Hypertension     Kidney stone     Migraine     Mixed hyperlipidemia 1/5/2021    Right arm fracture     Seasonal allergies     Shingles     Vitamin D deficiency        Past Surgical History:   Procedure Laterality Date    CARPAL TUNNEL RELEASE Bilateral     CATARACT EXTRACTION Left     MOUTH SURGERY       DC COLONOSCOPY FLX DX W/COLLJ SPEC WHEN PFRMD N/A 12/27/2018    Procedure: COLONOSCOPY;  Surgeon: Bita Montalvo MD;  Location: [facility];  Service: Gastroenterology    SINUS SURGERY      TUBAL LIGATION         Meds/Allergies:    Prior to Admission medications    Medication Sig Start Date End Date Taking? Authorizing Provider   Cholecalciferol (VITAMIN D3) 3000 units TABS Take 3,000 Units by mouth daily   Yes Historical Provider, MD   dicyclomine (BENTYL) 10 mg capsule TAKE 1 CAPSULE (10 MG TOTAL) BY MOUTH 3 (THREE) TIMES A DAY AS NEEDED (DIARRHEA AND ABDOMINAL PAIN) 8/1/24 2/17/25 Yes Bita Montalvo MD   DULoxetine (CYMBALTA) 60 mg delayed release capsule  5/12/24  Yes Historical Provider, MD   famotidine (PEPCID) 10 mg tablet Take 10 mg by mouth 2 (two) times a day   Yes Historical Provider, MD   fexofenadine (ALLEGRA) 180 MG tablet Take by mouth as needed   Yes Historical Provider, MD   fluticasone (FLONASE) 50 mcg/act nasal spray SPRAY 2 SPRAYS INTO EACH NOSTRIL 2 TIMES A DAY  Patient taking differently: if needed 9/13/22  Yes Shanda Crump PA-C   gabapentin (NEURONTIN) 100 mg capsule Take 200 mg by mouth daily at bedtime 4/23/24  Yes Historical Provider, MD   ipratropium (ATROVENT) 0.06 % nasal spray SPRAY 2 SPRAYS INTO EACH NOSTRIL 3 TIMES A DAY. 9/15/22  Yes Rosa Chávez PA-C   melatonin 3 mg Take 3 mg by mouth daily at bedtime   Yes Historical Provider, MD   meloxicam (MOBIC) 15 mg tablet Take 15 mg by mouth daily 8/14/18  Yes Historical Provider, MD   methocarbamol (ROBAXIN) 750 mg tablet Take 750 mg by mouth if needed   Yes Historical Provider, MD   [place]-3 1000 MG CAPS Take by mouth daily   Yes Historical Provider, MD   tirzepatide (Zepbound) 5 mg/0.5 mL auto-injector Inject 0.5 mL (5 mg total) under the skin once a week 8/21/24 10/16/24 Yes Marina Hurst PA-C   valsartan (DIOVAN) 160 mg tablet Take 160 mg by mouth daily 9/10/24 9/10/25 Yes Historical Provider, MD   albuterol  "(PROVENTIL HFA,VENTOLIN HFA) 90 mcg/act inhaler Inhale 2 puffs if needed 9/13/17   Historical Provider, MD   Ascorbic Acid (VITAMIN C) 100 MG tablet Take by mouth daily    Historical Provider, MD   Diclofenac Sodium (VOLTAREN) 1 % if needed    Historical Provider, MD   fluticasone-salmeterol (ADVAIR, WIXELA) 100-50 mcg/dose inhaler Inhale 1 puff 2 (two) times a day 12/22/20 6/10/24  Historical Provider, MD   sodium chloride (OCEAN) 0.65 % nasal spray 2 sprays into each nostril every 2 (two) hours while awake 1/3/23 6/10/24  Zeb Greenberg MD       Allergies:   Allergies   Allergen Reactions    Amoxicillin-Pot Clavulanate Hives    Penicillins        Social History:     Marital Status: /Civil Union     Social History     Substance and Sexual Activity   Alcohol Use Yes    Comment: social     Social History     Tobacco Use   Smoking Status Never   Smokeless Tobacco Never     Social History     Substance and Sexual Activity   Drug Use No       Family History:    Reviewed. Noncontributing    Physical Exam:     Vitals:   Blood Pressure: 128/70 (09/12/24 2222)  Pulse: 75 (09/12/24 2222)  Temperature: 97.7 °F (36.5 °C) (09/12/24 2222)  Temp Source: Oral (09/12/24 2222)  Respirations: 19 (09/12/24 2130)  Height: 5' 5\" (165.1 cm) (09/12/24 2308)  Weight - Scale: 96.1 kg (211 lb 13.8 oz) (09/12/24 2308)  SpO2: 97 % (09/12/24 2222)    Physical Exam:      Vitals:   Blood Pressure: 151/83 (01/05/24 1655)  Pulse: 88 (01/05/24 1655)  Temperature: 98.2 °F (36.8 °C) (01/05/24 1114)  Temp Source: Oral (01/05/24 1114)  Respirations: 18 (01/05/24 1655)  SpO2: 92 % (01/05/24 1655)     Physical Exam mild CVA tenderness on the left as compared to the right awake alert  Vitals and nursing note reviewed.   Constitutional:       General: She is not in acute distress.    HENT:      Right Ear: Ear canal normal. There is no impacted cerumen.      Left Ear: Ear canal normal. There is no impacted cerumen.      Ears:      Mouth/Throat:      " Mouth: Mucous membranes are moist.   Eyes:      General: No scleral icterus.     Extraocular Movements: Extraocular movements intact.      Pupils: Pupils are equal, round, and reactive to light.   Neck:      Vascular: No carotid bruit.   Cardiovascular:      Rate and Rhythm: Normal rate and regular rhythm.      Pulses: Normal pulses.      Heart sounds: Normal heart sounds. No murmur heard.     No friction rub. No gallop.   Pulmonary:      Effort: Pulmonary effort is normal. No respiratory distress.      Breath sounds: No wheezing or rhonchi.   Abdominal:      General: Abdomen is flat. There is no distension.      Tenderness: There is no abdominal tenderness. There is no right CVA tenderness or left CVA tenderness.   Musculoskeletal:         General: No swelling or tenderness. Normal range of motion.      Cervical back: Normal range of motion. No tenderness.      Right lower leg: No edema.      Left lower leg: No edema.   Lymphadenopathy:      Cervical: No cervical adenopathy.   Skin:     General: Skin is warm and dry.   Neurological:      General: No focal deficit present.      Mental Status: She is alert and oriented to person, place, and time.      Cranial Nerves: No cranial nerve deficit.      Sensory: No sensory deficit.      Motor: No weakness.       Additional Data:     Lab Results: Reviewed radiology reports from this admission including: CT abdomen/pelvis.    Results from last 7 days   Lab Units 09/12/24  1814   WBC Thousand/uL 12.21*   HEMOGLOBIN g/dL 13.9   HEMATOCRIT % 41.1   PLATELETS Thousands/uL 249   SEGS PCT % 70   LYMPHO PCT % 15   MONO PCT % 14*   EOS PCT % 0     Results from last 7 days   Lab Units 09/12/24  1814   SODIUM mmol/L 134*   POTASSIUM mmol/L 4.0   CHLORIDE mmol/L 101   CO2 mmol/L 23   BUN mg/dL 18   CREATININE mg/dL 1.26   ANION GAP mmol/L 10   CALCIUM mg/dL 9.5   ALBUMIN g/dL 4.2   TOTAL BILIRUBIN mg/dL 0.68   ALK PHOS U/L 35   ALT U/L 23   AST U/L 17   GLUCOSE RANDOM mg/dL 95                  Results from last 7 days   Lab Units 09/12/24 2138 09/12/24  1814   LACTIC ACID mmol/L 0.6  --    PROCALCITONIN ng/ml  --  0.54*       Imaging:     US pelvis complete w transvaginal   Final Result by Georges Young MD (09/12 2034)      1.  No convincing acute abnormality. No definitive evidence of acute torsion.   2.  There is a 3.9 cm, unilocular left ovarian cyst, with at least 2 avascular papillary projections. O-RADS 4 = Intermediate risk.  Imaging options include evaluation by ultrsaound specialist, MRI, or per GYN-oncologist protocol. Clinical management by    gynecologist with GYN-oncologist consultation or soley by GYN-oncologist.                     Workstation performed: [id number]         CT spine lumbar without contrast   Final Result by Ranulfo Bonilla MD (09/12 1924)   Focal (0.4 cm) left UPJ calculus with mild left-sided hydronephrosis. Subcentimeter left renal cortical calcification is noted.         The lumbar vertebral body heights are maintained demonstrating no acute fracture or subluxation      Degenerative disc space narrowing at the L5-S1 level with mild to moderate bilateral neural foraminal narrowing with possible encroachment of the exiting L5 nerve roots. Correlate with radiculopathy symptoms.      Left adnexal cystic lesion compatible with an ovarian cyst.      The study was marked in EPIC for immediate notification.      Workstation performed: [id number]         CT pelvis wo contrast   Final Result by Georges Young MD (09/12 1921)      1.  No identifiable acute abnormality to account for the patient's clinical presentation.   2.  There is a 4.6 cm left ovarian/adnexal cyst. Nonemergent follow-up pelvic ultrasound is recommended for further evaluation.      The study was marked in EPIC for significant notification.      Workstation performed: [id number]                     ** Please Note: This note has been constructed using a voice recognition system. **

## 2024-09-13 NOTE — CONSULTS
Consult - Urology   Maria Luz Ponce 1965, 59 y.o. female MRN: [id number]    Unit/Bed#: E5 -01 Encounter: [id number]      Nephrolithiasis  Assessment & Plan  One prior episode of kidney stones -- passed independenlty without surgical intervention   Presented to [facility] ED 9/12 with worsening back pain  UA positive for infection, urine culture pending  WBC 12.2, procalcitonin 0.54  Creatinine stable  CT reveals 4 mm left UPJ stone with mild hydronephrosis  Initiated on Rocephin in ED  Admitted overnight for ongoing observation and MET  NPO since midnight   Pain ongoing intermittently - vitals remain stable   Case request placed for left ureteral stent insertion this afternoon by Dr. Johns  Patient is aware she will require second stage surgery for definitive stone treatment  Surgical consent signed and given to or holding  Proceed with surgical intervention at this time      Subjective:   Maria Luz is a 59-year-old female with history of nephrolithiasis who presented to [facility] ED on 9/12 with reports of worsening back pain.  CT revealed 4 mm left UPJ stone with mild hydronephrosis.  At presentation she was tachycardic and had a temp of 99.9F.  Upon recheck, vitals had stabilized.  WBC mildly elevated at 12.2, procalcitonin elevated at 0.54.  Creatinine remained stable.  She remained afebrile.  UA was grossly positive for infection.  Urine culture sent, results pending.  Patient was admitted under internal medicine for observation overnight and was initiated on IV antibiotics.  She has been n.p.o. since midnight.    Today in consultation, patient reports ongoing pain intermittently at left flank rated 4/10.  She did not strain her urine overnight but did not visualize any stone passage.  Associated symptoms include decreased appetite and chills.  She denies chest pain, SOB, difficulty urinating or dizziness.  Case request placed for left ureteral stent placement with Dr. Johns.  Patient educated that since she has  "a positive urinary tract infection we will not proceed with laser lithotripsy and she will require a second stage surgery for definitive stone treatment.  Patient vocalizes her understanding.  Surgical consent signed at bedside.  Proceed with surgical intervention this afternoon.    Review of Systems   Constitutional:  Positive for chills. Negative for activity change, fatigue and fever.   Respiratory:  Negative for apnea, cough and shortness of breath.    Cardiovascular:  Negative for chest pain and leg swelling.   Gastrointestinal:  Negative for abdominal distention, abdominal pain, constipation, diarrhea, nausea and vomiting.   Genitourinary:  Positive for flank pain (left). Negative for difficulty urinating, dysuria, frequency, hematuria, pelvic pain, urgency, vaginal bleeding and vaginal discharge.   Musculoskeletal:  Negative for arthralgias and back pain.   Neurological:  Negative for dizziness and headaches.   Psychiatric/Behavioral: Negative.     All other systems reviewed and are negative.      Objective:  Vitals: Blood pressure 97/56, pulse 62, temperature 97.7 °F (36.5 °C), resp. rate 18, height 5' 5\" (1.651 m), weight 96.1 kg (211 lb 13.8 oz), last menstrual period 03/15/2019, SpO2 92%.,Body mass index is 35.26 kg/m².    Physical Exam  Vitals and nursing note reviewed.   Constitutional:       General: She is not in acute distress.     Appearance: Normal appearance. She is obese. She is not ill-appearing.   HENT:      Head: Normocephalic and atraumatic.      Right Ear: External ear normal.      Left Ear: External ear normal.      Nose: Nose normal.      Mouth/Throat:      Pharynx: Oropharynx is clear.   Eyes:      Extraocular Movements: Extraocular movements intact.      Conjunctiva/sclera: Conjunctivae normal.   Cardiovascular:      Rate and Rhythm: Normal rate.   Pulmonary:      Effort: Pulmonary effort is normal.   Abdominal:      General: Abdomen is flat. There is no distension.      Palpations: " Abdomen is soft.      Tenderness: There is no abdominal tenderness. There is left CVA tenderness. There is no right CVA tenderness.   Musculoskeletal:         General: Normal range of motion.      Cervical back: Normal range of motion.   Neurological:      General: No focal deficit present.      Mental Status: She is alert and oriented to person, place, and time.   Psychiatric:         Mood and Affect: Mood normal.         Behavior: Behavior normal.         Imaging:  CT LUMBAR SPINE WITHOUT CONTRAST     INDICATION:   back pain.     COMPARISON: None.     TECHNIQUE:  Contiguous axial images through the lumbar spine were obtained. Sagittal and coronal reconstructions were performed.     IV Contrast:     Radiation dose length product (DLP) for this visit:  795 mGy-cm .  This examination, like all CT scans performed in the [facility], was performed utilizing techniques to minimize radiation dose exposure, including the use of iterative   reconstruction and automated exposure control.     IMAGE QUALITY:  Diagnostic.     FINDINGS:     ALIGNMENT:  There are 5 lumbar type vertebral bodies.  Normal alignment of the lumbar spine.  No spondylolysis or spondylolisthesis.     VERTEBRAE:  No fracture.  No lytic or blastic lesion.     DEGENERATIVE CHANGES:     Lower Thoracic spine:  Normal lower thoracic disc spaces.     Lumbar Spine:  L1-2: No central canal stenosis or foraminal stenosis.     L2-3: No central canal stenosis or foraminal stenosis.     L3-4: No central canal stenosis or foraminal stenosis.     L4-5: Small broad-based left foraminal disc protrusion is noted. There is no central canal narrowing. Mild left neural foraminal stenosis.     L5-S1: Degenerative disc disease with disc space narrowing. There is no central canal stenosis. Mild to moderate bilateral neural foraminal narrowing with possible encroachment of the exiting L5 nerve roots.     PARASPINAL SOFT TISSUES:  Normal.     OTHER: A 0.4 cm  obstructive calculus is noted at the left ureteral pelvic junction with mild left-sided hydronephrosis (series 2: 36). A 3 mm left renal cortical calcification is noted.     A 4.5 x 3.6 x 3.7 cm (AP x ML x CC) left adnexal cyst is noted, incompletely imaged, likely ovarian in origin.     IMPRESSION:  Focal (0.4 cm) left UPJ calculus with mild left-sided hydronephrosis. Subcentimeter left renal cortical calcification is noted.        The lumbar vertebral body heights are maintained demonstrating no acute fracture or subluxation     Degenerative disc space narrowing at the L5-S1 level with mild to moderate bilateral neural foraminal narrowing with possible encroachment of the exiting L5 nerve roots. Correlate with radiculopathy symptoms.     Left adnexal cystic lesion compatible with an ovarian cyst.  Imaging reviewed - both report and images personally reviewed.     Labs:  Recent Labs     09/12/24  1814   WBC 12.21*     Recent Labs     09/12/24  1814   HGB 13.9       Recent Labs     09/12/24  1814 09/13/24  0435   CREATININE 1.26 0.91       Microbiology:  Urine culture pending  Blood cultures x 2 pending    History:  Social History     Socioeconomic History    Marital status: /Civil Union     Spouse name: None    Number of children: None    Years of education: None    Highest education level: None   Occupational History    None   Tobacco Use    Smoking status: Never    Smokeless tobacco: Never   Vaping Use    Vaping status: Never Used   Substance and Sexual Activity    Alcohol use: Yes     Comment: social    Drug use: No    Sexual activity: Yes     Partners: Male     Birth control/protection: Female Sterilization   Other Topics Concern    None   Social History Narrative    None     Social Determinants of Health     Financial Resource Strain: Low Risk  (2/20/2024)    [facility]    Overall Financial Resource Strain (CARDIA)     Difficulty of Paying  Living Expenses: Not hard at all   Food Insecurity: No Food Insecurity (2/20/2024)    [facility]    Hunger Vital Sign     Worried About Running Out of Food in the Last Year: Never true     Ran Out of Food in the Last Year: Never true   Transportation Needs: Unmet Transportation Needs (2/20/2024)    [facility]    PRAPARE - Transportation     Lack of Transportation (Medical): Yes     Lack of Transportation (Non-Medical): Yes   Physical Activity: Not on file   Stress: No Stress Concern Present (2/20/2024)    [facility]    [place] [employer] - Occupational Stress Questionnaire     Feeling of Stress : Only a little   Social Connections: Unknown (6/18/2024)    Received from [facility]     How often do you feel lonely or isolated from those around you? (Adult - for ages 18 years and over): Not on file   Intimate Partner Violence: Not At Risk (2/20/2024)    [facility]    Humiliation, Afraid, Rape, and Kick questionnaire     Fear of Current or Ex-Partner: No     Emotionally Abused: No     Physically Abused: No     Sexually Abused: No   Housing Stability: Not on file       Past Medical History:   Diagnosis Date    Ankle fracture     Asthma     Cervical spinal stenosis     Closed left arm fracture, sequela     Concussion     Degeneration, intervertebral disc, cervical     Hypertension     Kidney stone     Migraine     Mixed hyperlipidemia 1/5/2021    Right arm fracture     Seasonal allergies     Shingles     Vitamin D deficiency      Past Surgical History:   Procedure Laterality Date    CARPAL TUNNEL RELEASE Bilateral     CATARACT EXTRACTION Left     MOUTH SURGERY      CT COLONOSCOPY FLX DX W/COLLJ SPEC WHEN PFRMD N/A 12/27/2018     Procedure: COLONOSCOPY;  Surgeon: Bita Montalvo MD;  Location: [facility];  Service: Gastroenterology    SINUS SURGERY      TUBAL LIGATION       Family History   Problem Relation Age of Onset    Heart disease Father     No Known Problems Daughter     No Known Problems Daughter     No Known Problems Daughter     No Known Problems Maternal Grandmother     No Known Problems Maternal Grandfather     No Known Problems Paternal Grandmother     No Known Problems Paternal Grandfather     Uterine cancer Maternal Aunt 75    Lung cancer Paternal Aunt         age unknown       Altagracia Ling PA-C  Date: 9/13/2024 Time: 11:47 AM

## 2024-09-13 NOTE — CASE MANAGEMENT
Case Management Assessment & Discharge Planning Note    Patient name Maria Luz Ponce  Location [facility] 5 /E5 -* MRN [id number]  : 1965 Date 2024       Current Admission Date: 2024  Current Admission Diagnosis:Nephrolithiasis   Patient Active Problem List    Diagnosis Date Noted Date Diagnosed    Nephrolithiasis 2024     GERD (gastroesophageal reflux disease) 2024     Class 2 severe obesity due to excess calories with serious comorbidity and body mass index (BMI) of 37.0 to 37.9 in adult (HCC) 2024     Hepatic steatosis 2024     Hypertension      Mild intermittent asthma      Prediabetes 2023     Abnormal PFT 2021     Shortness of breath 2021     Mixed hyperlipidemia 2021     Diarrhea 2018     Renal colic on left side 2018     Lumbar disc disease 2018     Lumbar facet joint pain 2018       LOS (days): 1  Geometric Mean LOS (GMLOS) (days): 2.9  Days to GMLOS:2.2     OBJECTIVE:    Risk of Unplanned Readmission Score: 7.66         Current admission status: Inpatient       Preferred Pharmacy:   [facility]/pharmacy #[id number] [place], [place] - [address]  [address] [place] [address]  Phone: [phone] Fax: [phone]    [place], [place] - [address]  [address] [facility] [address]  Phone: [phone] Fax: [phone]    [facility] [place] [address]  [address] [place] [address]  Phone: [phone] Fax: [phone]    Primary Care Provider: Nohelia Whitman MD    Primary Insurance: BLUE CROSS  Secondary Insurance:     ASSESSMENT:  Active Health Care Proxies    There are no active Health Care Proxies on file.       Advance Directives  Does patient have a Health Care POA?: No  Does patient have Advance Directives?: No  Primary Contact: Hermes Ponce (Spouse)  [phone] (Mobile)         Readmission Root Cause  30 Day Readmission:  No    Patient Information  Admitted from:: Home  Mental Status: Alert  During Assessment patient was accompanied by: Not accompanied during assessment  Assessment information provided by:: Patient  Primary Caregiver: Self  Support Systems: Self, Family members, Spouse/significant other  County of Residence: [place]  What city do you live in?: [place]  Home entry access options. Select all that apply.: Stairs  Number of steps to enter home.: 2  Do the steps have railings?: No  Type of Current Residence: 2 story home  Living Arrangements: Lives w/ Spouse/significant other  Is patient a ?: No    Activities of Daily Living Prior to Admission  Functional Status: Independent  Completes ADLs independently?: Yes  Ambulates independently?: Yes  Does patient use assisted devices?: No  Does patient currently own DME?: No  Does patient have a history of Outpatient Therapy (PT/OT)?: Yes  Does the patient have a history of Short-Term Rehab?: No  Does patient have a history of HHC?: No  Does patient currently have HHC?: No         Patient Information Continued  Income Source: Employed  Does patient have prescription coverage?: Yes  Does patient receive dialysis treatments?: No  Does patient have a history of substance abuse?: No  Does patient have a history of Mental Health Diagnosis?: No    PHQ 2/9 Screening   Reviewed PHQ 2/9 Depression Screening Score?: No    Means of Transportation  Means of Transport to Appts:: Drives Self      Social Determinants of Health (SDOH)      Flowsheet Row Most Recent Value   Housing Stability    In the last 12 months, was there a time when you were not able to pay the mortgage or rent on time? N   In the past 12 months, how many times have you moved where you were living? 0   At any time in the past 12 months, were you homeless or living in a shelter (including now)? N   Transportation Needs    In the past 12 months, has lack of transportation kept you from medical appointments or from  getting medications? no   In the past 12 months, has lack of transportation kept you from meetings, work, or from getting things needed for daily living? No   Food Insecurity    Within the past 12 months, you worried that your food would run out before you got the money to buy more. Never true   Within the past 12 months, the food you bought just didn't last and you didn't have money to get more. Never true   Utilities    In the past 12 months has the electric, gas, oil, or water company threatened to shut off services in your home? No            DISCHARGE DETAILS:    Discharge planning discussed with:: Pt        CM contacted family/caregiver?: No- see comments (pt aox4)  Were Treatment Team discharge recommendations reviewed with patient/caregiver?: Yes  Did patient/caregiver verbalize understanding of patient care needs?: Yes                      Other Referral/Resources/Interventions Provided:  Interventions: None Indicated         Treatment Team Recommendation: Home  Discharge Destination Plan:: Home

## 2024-09-13 NOTE — ASSESSMENT & PLAN NOTE
Incidental finding on imaging  3.9 cm, unilocular left ovarian cyst, with at least 2 avascular papillary projections. O-RADS 4 = Intermediate risk.   No acute intervention required  Will place ambulatory referral to GynOnc - office messaged

## 2024-09-13 NOTE — OP NOTE
OPERATIVE REPORT  PATIENT NAME: Maria Luz Ponce    :  1965  MRN: [id number]  Pt Location: [facility] OR ROOM 04    SURGERY DATE: 2024    Surgeons and Role:     * Eder Johns MD - Primary     * James Parnell MD - Assisting    Preop Diagnosis:  Left ureteral calculus [N20.1]    Post-Op Diagnosis Codes:     * Left ureteral calculus [N20.1]    Procedure(s):  Left - CYSTOSCOPY URETEROSCOPY WITH RETROGRADE PYELOGRAM AND INSERTION STENT URETERAL    Specimen(s):  ID Type Source Tests Collected by Time Destination   A :  Urine Urine, Cystoscopic  Eder Johns MD 2024 181        Estimated Blood Loss:   Minimal    Drains:  Left 26/6 Danish double-J ureteral stent without string    Anesthesia Type:   General    Operative Indications:  Left ureteral calculus [N20.1]    Operative Findings:  Left proximal ureteral calculus, status post left ureteral stent insertion without a string.  Cloudy appearing and for culture.      Complications:   None    Procedure and Technique:  Maria Luz is a 59-year-old female who presented to [facility] with back pain.  CT of her spine shows a 4 mm left UPJ calculus.  Her temperature is 99.9 with a white blood cell count of 12,000.  I have recommended cystoscopy with stent insertion.  Risk and benefits of cystoscopy with left ureteral stent insertion were discussed and reviewed.  The patient understands that I will not remove her stone at this time and that he will require interval ureteroscopy in the future.  Informed consent was obtained.  The patient was brought to the operating room on 2024.    After the smooth induction of general LMA anesthesia, the patient was placed in the dorsal lithotomy position.  Her genitalia was prepped and draped in a sterile fashion.  Venodyne boots had been applied.  Intravenous antibiotics were administered.  A timeout was performed with all members of the operative team confirming the patient's identity,  procedure to be performed, and laterality of the case.    A 24 Palestinian rigid cystoscope with 30° lens was inserted.  The bladder was thoroughly inspected. Attention was focused on the left ureteral orifice.  A 5 Palestinian open-ended catheter was inserted.  A left retrograde pyelogram was performed.  A filling defect in the left proximal ureter was identified consistent with the stone.  A wire was placed proximal to the stone and cloudy-appearing urine came from the left ureteral orifice.  .  A 26-6 Palestinian double-J ureteral stent was then placed in the standard fashion.  The proximal coil was appreciated in the left renal pelvis and the distal coil was visualized within the bladder.  There was no string left in place.  The bladder was emptied and the cystoscope was removed.    Overall the patient tolerated the procedure well and there were no complications.  The patient was extubated in the operating room and transferred to the PACU in stable condition at the conclusion of the case.    Patient Disposition:  PACU              SIGNATURE: James Parnell MD  DATE: September 13, 2024  TIME: 6:21 PM

## 2024-09-13 NOTE — PLAN OF CARE
Problem: PAIN - ADULT  Goal: Verbalizes/displays adequate comfort level or baseline comfort level  Description: Interventions:  - Encourage patient to monitor pain and request assistance  - Assess pain using appropriate pain scale  - Administer analgesics based on type and severity of pain and evaluate response  - Implement non-pharmacological measures as appropriate and evaluate response  - Consider cultural and social influences on pain and pain management  - Notify physician/advanced practitioner if interventions unsuccessful or patient reports new pain  Outcome: Progressing     Problem: INFECTION - ADULT  Goal: Absence or prevention of progression during hospitalization  Description: INTERVENTIONS:  - Assess and monitor for signs and symptoms of infection  - Monitor lab/diagnostic results  - Monitor all insertion sites, i.e. indwelling lines, tubes, and drains  - Monitor endotracheal if appropriate and nasal secretions for changes in amount and color  - [place] appropriate cooling/warming therapies per order  - Administer medications as ordered  - Instruct and encourage patient and family to use good hand hygiene technique  - Identify and instruct in appropriate isolation precautions for identified infection/condition  Outcome: Progressing     Problem: SAFETY ADULT  Goal: Patient will remain free of falls  Description: INTERVENTIONS:  - Educate patient/family on patient safety including physical limitations  - Instruct patient to call for assistance with activity   - Consult OT/PT to assist with strengthening/mobility   - Keep Call bell within reach  - Keep bed low and locked with side rails adjusted as appropriate  - Keep care items and personal belongings within reach  - Initiate and maintain comfort rounds  - Make Fall Risk Sign visible to staff  - Apply yellow socks and bracelet for high fall risk patients  - Consider moving patient to room near nurses station  Outcome: Progressing  Goal: Maintain or  return to baseline ADL function  Description: INTERVENTIONS:  -  Assess patient's ability to carry out ADLs; assess patient's baseline for ADL function and identify physical deficits which impact ability to perform ADLs (bathing, care of mouth/teeth, toileting, grooming, dressing, etc.)  - Assess/evaluate cause of self-care deficits   - Assess range of motion  - Assess patient's mobility; develop plan if impaired  - Assess patient's need for assistive devices and provide as appropriate  - Encourage maximum independence but intervene and supervise when necessary  - Involve family in performance of ADLs  - Assess for home care needs following discharge   - Consider OT consult to assist with ADL evaluation and planning for discharge  - Provide patient education as appropriate  Outcome: Progressing     Problem: DISCHARGE PLANNING  Goal: Discharge to home or other facility with appropriate resources  Description: INTERVENTIONS:  - Identify barriers to discharge w/patient and caregiver  - Arrange for needed discharge resources and transportation as appropriate  - Identify discharge learning needs (meds, wound care, etc.)  - Arrange for interpretive services to assist at discharge as needed  - Refer to Case Management Department for coordinating discharge planning if the patient needs post-hospital services based on physician/advanced practitioner order or complex needs related to functional status, cognitive ability, or social support system  Outcome: Progressing     Problem: Knowledge Deficit  Goal: Patient/family/caregiver demonstrates understanding of disease process, treatment plan, medications, and discharge instructions  Description: Complete learning assessment and assess knowledge base.  Interventions:  - Provide teaching at level of understanding  - Provide teaching via preferred learning methods  Outcome: Progressing     Problem: CARDIOVASCULAR - ADULT  Goal: Maintains optimal cardiac output and hemodynamic  stability  Description: INTERVENTIONS:  - Monitor I/O, vital signs and rhythm  - Monitor for S/S and trends of decreased cardiac output  - Administer and titrate ordered vasoactive medications to optimize hemodynamic stability  - Assess quality of pulses, skin color and temperature  - Assess for signs of decreased coronary artery perfusion  - Instruct patient to report change in severity of symptoms  Outcome: Progressing     Problem: RESPIRATORY - ADULT  Goal: Achieves optimal ventilation and oxygenation  Description: INTERVENTIONS:  - Assess for changes in respiratory status  - Assess for changes in mentation and behavior  - Position to facilitate oxygenation and minimize respiratory effort  - Oxygen administered by appropriate delivery if ordered  - Initiate smoking cessation education as indicated  - Encourage broncho-pulmonary hygiene including cough, deep breathe, Incentive Spirometry  - Assess the need for suctioning and aspirate as needed  - Assess and instruct to report SOB or any respiratory difficulty  - Respiratory Therapy support as indicated  Outcome: Progressing     Problem: GASTROINTESTINAL - ADULT  Goal: Minimal or absence of nausea and/or vomiting  Description: INTERVENTIONS:  - Administer IV fluids if ordered to ensure adequate hydration  - Maintain NPO status until nausea and vomiting are resolved  - Nasogastric tube if ordered  - Administer ordered antiemetic medications as needed  - Provide nonpharmacologic comfort measures as appropriate  - Advance diet as tolerated, if ordered  - Consider nutrition services referral to assist patient with adequate nutrition and appropriate food choices  Outcome: Progressing  Goal: Maintains or returns to baseline bowel function  Description: INTERVENTIONS:  - Assess bowel function  - Encourage oral fluids to ensure adequate hydration  - Administer IV fluids if ordered to ensure adequate hydration  - Administer ordered medications as needed  - Encourage  mobilization and activity  - Consider nutritional services referral to assist patient with adequate nutrition and appropriate food choices  Outcome: Progressing  Goal: Maintains adequate nutritional intake  Description: INTERVENTIONS:  - Monitor percentage of each meal consumed  - Identify factors contributing to decreased intake, treat as appropriate  - Assist with meals as needed  - Monitor I&O, weight, and lab values if indicated  - Obtain nutrition services referral as needed  Outcome: Progressing     Problem: GENITOURINARY - ADULT  Goal: Maintains or returns to baseline urinary function  Description: INTERVENTIONS:  - Assess urinary function  - Encourage oral fluids to ensure adequate hydration if ordered  - Administer IV fluids as ordered to ensure adequate hydration  - Administer ordered medications as needed  - Offer frequent toileting  - Follow urinary retention protocol if ordered  Outcome: Progressing  Goal: Absence of urinary retention  Description: INTERVENTIONS:  - Assess patient’s ability to void and empty bladder  - Monitor I/O  - Bladder scan as needed  - Discuss with physician/AP medications to alleviate retention as needed  - Discuss catheterization for long term situations as appropriate  Outcome: Progressing     Problem: METABOLIC, FLUID AND ELECTROLYTES - ADULT  Goal: Electrolytes maintained within normal limits  Description: INTERVENTIONS:  - Monitor labs and assess patient for signs and symptoms of electrolyte imbalances  - Administer electrolyte replacement as ordered  - Monitor response to electrolyte replacements, including repeat lab results as appropriate  - Instruct patient on fluid and nutrition as appropriate  Outcome: Progressing  Goal: Fluid balance maintained  Description: INTERVENTIONS:  - Monitor labs   - Monitor I/O and WT  - Instruct patient on fluid and nutrition as appropriate  - Assess for signs & symptoms of volume excess or deficit  Outcome: Progressing     Problem:  HEMATOLOGIC - ADULT  Goal: Maintains hematologic stability  Description: INTERVENTIONS  - Assess for signs and symptoms of bleeding or hemorrhage  - Monitor labs  - Administer supportive blood products/factors as ordered and appropriate  Outcome: Progressing

## 2024-09-13 NOTE — ASSESSMENT & PLAN NOTE
-Continue home dose of Pepcid    DVT prophylaxis Lovenox 40 a day  Continue home dose of gabapentin

## 2024-09-13 NOTE — QUICK NOTE
Maria Luz is a 58 yo female hx of nephrolithiasis presenting to the ED for back pain. CT showing 4 mm Left UPJ stone with mild hydronephrosis. Tachycardic, temp 99.9F, repeat normal. WBC 12. Reports chills a couple days ago. No fevers. UA positive.             Plan:  - Admit to SLIM  - IV antibiotics  - Fluids  - Pain control  - NPO at midnight and added on to OR for stent placement  - If patient develops fever overnight, contact urology for expedited ureteral stent placement  - Full consult to follow tomorrow AM

## 2024-09-13 NOTE — CONSULTS
Consultation - Obstetrics & Gynecology  Maria Luz Ponce 59 y.o. female MRN: [id number]  Unit/Bed#: OR POOL Encounter: [id number]      Assessment & Plan     Cyst of left ovary  Assessment & Plan  Incidental finding on imaging  3.9 cm, unilocular left ovarian cyst, with at least 2 avascular papillary projections. O-RADS 4 = Intermediate risk.   No acute intervention required  Will place ambulatory referral to GynOnc - office messaged          Patient seen and evaluated with Dr. Lynn    Inpatient consult to Obstetrics / Gynecology  Consult performed by: Bhavesh Ricardo MD  Consult ordered by: Ana Maria Berry DO        History of Present Illness   Physician Requesting Consult: Harrison Torres DO  Reason for Consult / Principal Problem: Cyst of left ovary    HPI: Maria Luz Ponce is a 59 y.o.  who presented with flank pain, was found to have nephrolithiasis with a left UPJ stone. Incidentally found on imaging was a 4cm cyst of left ovary with 2 papillary-appearing projections. ORADs category 4. We discussed that at her current age and being that she is menopausal this is suspicious for a possible malignancy. We recommended she follow-up outpatient with Gyn Oncology for further recommendations once she is discharged. She expressed understanding and all questions were answered.    Review of Systems   Constitutional:  Negative for chills and fever.   Eyes:  Negative for visual disturbance.   Respiratory:  Negative for shortness of breath.    Cardiovascular:  Negative for chest pain.   Gastrointestinal:  Negative for diarrhea, nausea and vomiting.   Genitourinary:  Positive for flank pain. Negative for dysuria, hematuria, vaginal bleeding and vaginal discharge.   Skin:  Negative for rash.   Neurological:  Negative for dizziness, numbness and headaches.   All other systems reviewed and are negative.      Historical Information   Past Medical History:   Diagnosis Date    Ankle fracture     Asthma     Cervical spinal  stenosis     Closed left arm fracture, sequela     Concussion     Degeneration, intervertebral disc, cervical     Hypertension     Kidney stone     Migraine     Mixed hyperlipidemia 2021    Right arm fracture     Seasonal allergies     Shingles     Vitamin D deficiency      Past Surgical History:   Procedure Laterality Date    CARPAL TUNNEL RELEASE Bilateral     CATARACT EXTRACTION Left     MOUTH SURGERY      AL COLONOSCOPY FLX DX W/COLLJ SPEC WHEN PFRMD N/A 2018    Procedure: COLONOSCOPY;  Surgeon: Bita Montalvo MD;  Location: [facility];  Service: Gastroenterology    SINUS SURGERY      TUBAL LIGATION       OB History    Para Term  AB Living   3 3 3         SAB IAB Ectopic Multiple Live Births           3      # Outcome Date GA Lbr Abdoul/2nd Weight Sex Type Anes PTL Lv   3 Term            2 Term            1 Term              Family History   Problem Relation Age of Onset    Heart disease Father     No Known Problems Daughter     No Known Problems Daughter     No Known Problems Daughter     No Known Problems Maternal Grandmother     No Known Problems Maternal Grandfather     No Known Problems Paternal Grandmother     No Known Problems Paternal Grandfather     Uterine cancer Maternal Aunt 75    Lung cancer Paternal Aunt         age unknown     Social History   Social History     Substance and Sexual Activity   Alcohol Use Yes    Comment: social     Social History     Substance and Sexual Activity   Drug Use No     Social History     Tobacco Use   Smoking Status Never   Smokeless Tobacco Never       Meds/Allergies     Current Facility-Administered Medications:     [Transfer Hold] acetaminophen (TYLENOL) tablet 650 mg, Q6H PRN    [Transfer Hold] ceftriaxone (ROCEPHIN) 1 g/50 mL in dextrose IVPB, Q24H, Last Rate: 1,000 mg (24)    [Transfer Hold] cefTRIAXone (ROCEPHIN) 1,000 mg in dextrose 5 % 50 mL IVPB, Q24H    DULoxetine (CYMBALTA) delayed release capsule 60 mg, Daily    enoxaparin  "(LOVENOX) subcutaneous injection 40 mg, Daily    [Transfer Hold] famotidine (PEPCID) tablet 10 mg, Daily    gabapentin (NEURONTIN) capsule 200 mg, HS    losartan (COZAAR) tablet 25 mg, Daily    morphine injection 2 mg, Q6H PRN    potassium phosphates 21 mmol in sodium chloride 0.9 % 250 mL infusion, Once    sodium chloride 0.9 % infusion, Continuous, Last Rate: 125 mL/hr (09/13/24 1533)    sodium chloride 0.9 % infusion, Continuous    Allergies   Allergen Reactions    Amoxicillin-Pot Clavulanate Hives    Penicillins        Objective   Vitals: Blood pressure 97/56, pulse 62, temperature 97.7 °F (36.5 °C), resp. rate 18, height 5' 5\" (1.651 m), weight 96.1 kg (211 lb 13.8 oz), last menstrual period 03/15/2019, SpO2 92%. Body mass index is 35.26 kg/m².      Intake/Output Summary (Last 24 hours) at 9/13/2024 1617  Last data filed at 9/13/2024 0804  Gross per 24 hour   Intake 1100 ml   Output 600 ml   Net 500 ml       Invasive Devices       Peripheral Intravenous Line  Duration             Peripheral IV 09/12/24 Right Antecubital <1 day                    Physical Exam  Constitutional:       General: She is not in acute distress.     Appearance: Normal appearance.   HENT:      Head: Normocephalic and atraumatic.   Eyes:      Extraocular Movements: Extraocular movements intact.   Cardiovascular:      Rate and Rhythm: Normal rate.      Pulses: Normal pulses.   Pulmonary:      Effort: Pulmonary effort is normal. No respiratory distress.   Musculoskeletal:         General: Normal range of motion.      Cervical back: Normal range of motion.   Neurological:      General: No focal deficit present.      Mental Status: She is alert and oriented to person, place, and time.   Skin:     General: Skin is warm and dry.   Psychiatric:         Mood and Affect: Mood normal.         Behavior: Behavior normal.   Vitals reviewed. Exam conducted with a chaperone present.          Lab Results:   Recent Results (from the past 24 hour(s))   CBC " and differential    Collection Time: 09/12/24  6:14 PM   Result Value Ref Range    WBC 12.21 (H) 4.31 - 10.16 Thousand/uL    RBC 4.46 3.81 - 5.12 Million/uL    Hemoglobin 13.9 11.5 - 15.4 g/dL    Hematocrit 41.1 34.8 - 46.1 %    MCV 92 82 - 98 fL    MCH 31.2 26.8 - 34.3 pg    MCHC 33.8 31.4 - 37.4 g/dL    RDW 12.7 11.6 - 15.1 %    MPV 9.8 8.9 - 12.7 fL    Platelets 249 149 - 390 Thousands/uL    nRBC 0 /100 WBCs    Segmented % 70 43 - 75 %    Immature Grans % 0 0 - 2 %    Lymphocytes % 15 14 - 44 %    Monocytes % 14 (H) 4 - 12 %    Eosinophils Relative 0 0 - 6 %    Basophils Relative 1 0 - 1 %    Absolute Neutrophils 8.69 (H) 1.85 - 7.62 Thousands/µL    Absolute Immature Grans 0.02 0.00 - 0.20 Thousand/uL    Absolute Lymphocytes 1.77 0.60 - 4.47 Thousands/µL    Absolute Monocytes 1.65 (H) 0.17 - 1.22 Thousand/µL    Eosinophils Absolute 0.01 0.00 - 0.61 Thousand/µL    Basophils Absolute 0.07 0.00 - 0.10 Thousands/µL   Comprehensive metabolic panel    Collection Time: 09/12/24  6:14 PM   Result Value Ref Range    Sodium 134 (L) 135 - 147 mmol/L    Potassium 4.0 3.5 - 5.3 mmol/L    Chloride 101 96 - 108 mmol/L    CO2 23 21 - 32 mmol/L    ANION GAP 10 4 - 13 mmol/L    BUN 18 5 - 25 mg/dL    Creatinine 1.26 0.60 - 1.30 mg/dL    Glucose 95 65 - 140 mg/dL    Calcium 9.5 8.4 - 10.2 mg/dL    AST 17 13 - 39 U/L    ALT 23 7 - 52 U/L    Alkaline Phosphatase 35 34 - 104 U/L    Total Protein 8.1 6.4 - 8.4 g/dL    Albumin 4.2 3.5 - 5.0 g/dL    Total Bilirubin 0.68 0.20 - 1.00 mg/dL    eGFR 46 ml/min/1.73sq m   Procalcitonin    Collection Time: 09/12/24  6:14 PM   Result Value Ref Range    Procalcitonin 0.54 (H) <=0.25 ng/ml   UA (URINE) with reflex to Scope    Collection Time: 09/12/24  7:36 PM   Result Value Ref Range    Color, UA Yellow     Clarity, UA Turbid     Specific Gravity, UA 1.032 (H) 1.003 - 1.030    pH, UA 6.0 4.5, 5.0, 5.5, 6.0, 6.5, 7.0, 7.5, 8.0    Leukocytes, UA Large (A) Negative    Nitrite, UA Positive (A)  Negative    Protein, UA 70 (1+) (A) Negative mg/dl    Glucose, UA Negative Negative mg/dl    Ketones, UA 40 (2+) (A) Negative mg/dl    Urobilinogen, UA 2.0 (A) <2.0 mg/dl mg/dl    Bilirubin, UA Negative Negative    Occult Blood, UA Small (A) Negative   Urine Microscopic    Collection Time: 09/12/24  7:36 PM   Result Value Ref Range    RBC, UA 20-30 (A) None Seen, 1-2 /hpf    WBC, UA Innumerable (A) None Seen, 1-2 /hpf    Epithelial Cells Occasional None Seen, Occasional /hpf    Bacteria, UA Innumerable (A) None Seen, Occasional /hpf    MUCUS THREADS Moderate (A) None Seen    Hyaline Casts, UA 25-50 (A) None Seen /lpf    Transitional Epithelial Cells Present    Blood culture #1    Collection Time: 09/12/24  9:35 PM    Specimen: Hand, Left; Blood   Result Value Ref Range    Blood Culture Received in Microbiology Lab. Culture in Progress.    Lactic acid, plasma (w/reflex if result > 2.0)    Collection Time: 09/12/24  9:38 PM   Result Value Ref Range    LACTIC ACID 0.6 0.5 - 2.0 mmol/L   Blood culture #2    Collection Time: 09/12/24  9:38 PM    Specimen: Hand, Right; Blood   Result Value Ref Range    Blood Culture Received in Microbiology Lab. Culture in Progress.    B-Type Natriuretic Peptide(BNP)    Collection Time: 09/13/24  4:35 AM   Result Value Ref Range    BNP 75 0 - 100 pg/mL   Platelet count    Collection Time: 09/13/24  4:35 AM   Result Value Ref Range    Platelets 223 149 - 390 Thousands/uL    MPV 10.1 8.9 - 12.7 fL   Comprehensive metabolic panel    Collection Time: 09/13/24  4:35 AM   Result Value Ref Range    Sodium 137 135 - 147 mmol/L    Potassium 4.2 3.5 - 5.3 mmol/L    Chloride 107 96 - 108 mmol/L    CO2 23 21 - 32 mmol/L    ANION GAP 7 4 - 13 mmol/L    BUN 18 5 - 25 mg/dL    Creatinine 0.91 0.60 - 1.30 mg/dL    Glucose 153 (H) 65 - 140 mg/dL    Calcium 8.7 8.4 - 10.2 mg/dL    AST 13 13 - 39 U/L    ALT 18 7 - 52 U/L    Alkaline Phosphatase 28 (L) 34 - 104 U/L    Total Protein 7.2 6.4 - 8.4 g/dL     Albumin 3.8 3.5 - 5.0 g/dL    Total Bilirubin 0.31 0.20 - 1.00 mg/dL    eGFR 69 ml/min/1.73sq m   Phosphorus    Collection Time: 24  4:35 AM   Result Value Ref Range    Phosphorus 1.4 (L) 2.7 - 4.5 mg/dL   Magnesium    Collection Time: 24  4:35 AM   Result Value Ref Range    Magnesium 2.5 1.9 - 2.7 mg/dL       Imagin/12 TVUS: There is a 3.9 cm, unilocular left ovarian cyst, with at least 2 avascular papillary projections. O-RADS 4 = Intermediate risk.  Imaging options include evaluation by ultrsaound specialist, MRI, or per GYN-oncologist protocol. Clinical management by   gynecologist with GYN-oncologist consultation or soley by GYN-oncologist.        Bhavesh Ricardo MD  PGY-3, OB/GYN  2024, 4:17 PM

## 2024-09-13 NOTE — UTILIZATION REVIEW
Initial Clinical Review    Admission: Date/Time/Statement:   Admission Orders (From admission, onward)       Ordered        09/12/24 2112  INPATIENT ADMISSION  Once                          Orders Placed This Encounter   Procedures    INPATIENT ADMISSION     Standing Status:   Standing     Number of Occurrences:   1     Order Specific Question:   Level of Care     Answer:   Med Surg [16]     Order Specific Question:   Estimated length of stay     Answer:   More than 2 Midnights     Order Specific Question:   Certification     Answer:   I certify that inpatient services are medically necessary for this patient for a duration of greater than two midnights. See H&P and MD Progress Notes for additional information about the patient's course of treatment.     ED Arrival Information       Expected   -    Arrival   9/12/2024 17:18    Acuity   Urgent              Means of arrival   Walk-In    Escorted by   Self    Service   Hospitalist    Admission type   Emergency              Arrival complaint   Back pain             Chief Complaint   Patient presents with    Back Pain     Pt reports since Tuesday after taking a nap on the recliner with sever lower back pain that radiates to bilateral legs. Denies other symptoms at this time.        Initial Presentation: 59 y.o. female presents to ED from home with back pain,  chills and dysuria for past  2 days.  Ct scan showed UVJ calculus.  U/A  showed a  UTI.  PMH  is  GERD  and  HTN.  Admit  Ip with  Nephrolithiasis   and plan is  LAKESHIA, monitor labs, pain control, and continue home meds.      Date:   9/13   Day 2:   Urology consult  Had a  prior episodes of kidney stone and  passed spontaneously.  Has  ongoing intermittent pain.    Plan surgical intervention.    SURGERY DATE: 9/13/2024   Procedure(s):  Left - CYSTOSCOPY URETEROSCOPY WITH RETROGRADE PYELOGRAM AND INSERTION STENT URETERAL    Operative Findings:  Left proximal ureteral calculus, status post left ureteral stent insertion  without a string.  Cloudy appearing and for culture.    Post op note  9/13  iman Braga underwent cystoscopy with left retrograde pyelogram and left ureteral stent insertion. I did not remove her stone. I would monitor her for 24 hours to ensure resolution of her white blood cell count and to ensure that she does not have a fever. Continue with antibiotics. Follow urine cultures. Plan for discharge home on September 14, 2024. Outpatient follow-up will be arranged for interval ureteroscopy.        ED Triage Vitals [09/12/24 1724]   Temperature Pulse Respirations Blood Pressure SpO2 Pain Score   99.9 °F (37.7 °C) (!) 108 18 129/67 96 % 8     Weight (last 2 days)       Date/Time Weight    09/12/24 2308 96.1 (211.86)    09/12/24 1724 96.1 (211.86)            Vital Signs (last 3 days)       Date/Time Temp Pulse Resp BP MAP (mmHg) SpO2 O2 Device Patient Position - Orthostatic VS [place] Coma Scale Score Pain    09/13/24 1115 -- -- -- -- -- -- -- -- -- 4    09/13/24 0801 -- -- -- -- -- -- None (Room air) -- -- No Pain    09/13/24 07:38:08 97.7 °F (36.5 °C) 62 18 97/56 70 92 % -- -- -- --    09/13/24 0500 -- -- -- -- -- -- -- -- -- No Pain    09/12/24 2308 -- -- -- -- -- -- -- -- -- 2    09/12/24 22:22:46 97.7 °F (36.5 °C) 75 -- 128/70 89 97 % -- -- -- --    09/12/24 22:22:31 97.7 °F (36.5 °C) 73 -- 128/70 89 96 % -- -- -- --    09/12/24 2130 -- 76 19 120/71 -- 96 % None (Room air) Lying -- --    09/12/24 1950 97.8 °F (36.6 °C) -- -- -- -- -- -- -- -- --    09/12/24 1827 -- -- -- -- -- -- None (Room air) -- -- --    09/12/24 1826 -- -- -- -- -- -- -- -- 15 7    09/12/24 1804 -- -- -- -- -- -- -- -- -- 7    09/12/24 1724 99.9 °F (37.7 °C) 108 18 129/67 -- 96 % None (Room air) Sitting -- 8              Pertinent Labs/Diagnostic Test Results:   Radiology:  US pelvis complete w transvaginal   Final Interpretation by Georges Young MD (09/12 2034)      1.  No convincing acute abnormality. No definitive evidence of acute  torsion.   2.  There is a 3.9 cm, unilocular left ovarian cyst, with at least 2 avascular papillary projections. O-RADS 4 = Intermediate risk.  Imaging options include evaluation by ultrsaound specialist, MRI, or per GYN-oncologist protocol. Clinical management by    gynecologist with GYN-oncologist consultation or soley by GYN-oncologist.                     Workstation performed: [id number]         CT spine lumbar without contrast   Final Interpretation by Ranulfo Bonilla MD (09/12 1924)   Focal (0.4 cm) left UPJ calculus with mild left-sided hydronephrosis. Subcentimeter left renal cortical calcification is noted.         The lumbar vertebral body heights are maintained demonstrating no acute fracture or subluxation      Degenerative disc space narrowing at the L5-S1 level with mild to moderate bilateral neural foraminal narrowing with possible encroachment of the exiting L5 nerve roots. Correlate with radiculopathy symptoms.      Left adnexal cystic lesion compatible with an ovarian cyst.      The study was marked in EPIC for immediate notification.      Workstation performed: [id number]         CT pelvis wo contrast   Final Interpretation by Georges Young MD (09/12 1921)      1.  No identifiable acute abnormality to account for the patient's clinical presentation.   2.  There is a 4.6 cm left ovarian/adnexal cyst. Nonemergent follow-up pelvic ultrasound is recommended for further evaluation.      The study was marked in EPIC for significant notification.      Workstation performed: [id number]         FL < 1 hour    (Results Pending)         Results from last 7 days   Lab Units 09/13/24  0435 09/12/24  1814   WBC Thousand/uL  --  12.21*   HEMOGLOBIN g/dL  --  13.9   HEMATOCRIT %  --  41.1   PLATELETS Thousands/uL 223 249   TOTAL NEUT ABS Thousands/µL  --  8.69*         Results from last 7 days   Lab Units 09/13/24  0435 09/12/24  1814   SODIUM mmol/L 137 134*   POTASSIUM mmol/L 4.2 4.0   CHLORIDE  mmol/L 107 101   CO2 mmol/L 23 23   ANION GAP mmol/L 7 10   BUN mg/dL 18 18   CREATININE mg/dL 0.91 1.26   EGFR ml/min/1.73sq m 69 46   CALCIUM mg/dL 8.7 9.5   MAGNESIUM mg/dL 2.5  --    PHOSPHORUS mg/dL 1.4*  --      Results from last 7 days   Lab Units 09/13/24  0435 09/12/24  1814   AST U/L 13 17   ALT U/L 18 23   ALK PHOS U/L 28* 35   TOTAL PROTEIN g/dL 7.2 8.1   ALBUMIN g/dL 3.8 4.2   TOTAL BILIRUBIN mg/dL 0.31 0.68         Results from last 7 days   Lab Units 09/13/24  0435 09/12/24  1814   GLUCOSE RANDOM mg/dL 153* 95               Results from last 7 days   Lab Units 09/12/24  1814   PROCALCITONIN ng/ml 0.54*     Results from last 7 days   Lab Units 09/12/24  2138   LACTIC ACID mmol/L 0.6             Results from last 7 days   Lab Units 09/13/24 0435   BNP pg/mL 75           Results from last 7 days   Lab Units 09/12/24  1936   CLARITY UA  Turbid   COLOR UA  Yellow   SPEC GRAV UA  1.032*   PH UA  6.0   GLUCOSE UA mg/dl Negative   KETONES UA mg/dl 40 (2+)*   BLOOD UA  Small*   PROTEIN UA mg/dl 70 (1+)*   NITRITE UA  Positive*   BILIRUBIN UA  Negative   UROBILINOGEN UA (BE) mg/dl 2.0*   LEUKOCYTES UA  Large*   WBC UA /hpf Innumerable*   RBC UA /hpf 20-30*   BACTERIA UA /hpf Innumerable*   EPITHELIAL CELLS WET PREP /hpf Occasional   MUCUS THREADS  Moderate*               Results from last 7 days   Lab Units 09/12/24 2138 09/12/24 2135   BLOOD CULTURE  Received in Microbiology Lab. Culture in Progress. Received in Microbiology Lab. Culture in Progress.                   ED Treatment-Medication Administration from 09/12/2024 1718 to 09/12/2024 2217         Date/Time Order Dose Route Action     09/12/2024 1806 diazepam (VALIUM) tablet 5 mg 5 mg Oral Given     09/12/2024 1804 ketorolac (TORADOL) injection 30 mg 30 mg Intravenous Given     09/12/2024 1805 dexamethasone (PF) (DECADRON) injection 10 mg 10 mg Intravenous Given     09/12/2024 1806 fentaNYL injection 50 mcg 50 mcg Intravenous Given     09/12/2024 1806  sodium chloride 0.9 % bolus 1,000 mL 1,000 mL Intravenous New Bag     09/12/2024 1806 acetaminophen (Ofirmev) injection 1,000 mg 1,000 mg Intravenous New Bag     09/12/2024 2142 sodium chloride 0.9 % bolus 1,000 mL 1,000 mL Intravenous New Bag     09/12/2024 2146 ceftriaxone (ROCEPHIN) 1 g/50 mL in dextrose IVPB 1,000 mg Intravenous New Bag              Present on Admission:   Hypertension   Mixed hyperlipidemia      Admitting Diagnosis: Low back pain [M54.50]  Back pain [M54.9]  Urolithiasis [N20.9]  Pyelonephritis [N12]  Cyst of left ovary [N83.202]  Left ureteral calculus [N20.1]  Abnormal ultrasound of pelvis [R93.89]  Age/Sex: 59 y.o. female  Admission Orders:  Scheduled Medications:  cefTRIAXone, 1,000 mg, Intravenous, Q24H  cefTRIAXone, 1,000 mg, Intravenous, Q24H  DULoxetine, 60 mg, Oral, Daily  enoxaparin, 40 mg, Subcutaneous, Daily  famotidine, 10 mg, Oral, Daily  gabapentin, 200 mg, Oral, HS  losartan, 25 mg, Oral, Daily      Continuous IV Infusions:  sodium chloride, 125 mL/hr, Intravenous, Continuous      PRN Meds:  acetaminophen, 650 mg, Oral, Q6H PRN  morphine injection, 2 mg, Intravenous, Q6H PRN        IP CONSULT TO OB GYN  IP CONSULT TO UROLOGY    Network Utilization Review Department  ATTENTION: Please call with any questions or concerns to [phone] and carefully listen to the prompts so that you are directed to the right person. All voicemails are confidential.   For Discharge needs, contact Care Management DC Support Team at [phone] opt. [phone]  Send all requests for admission clinical reviews, approved or denied determinations and any other requests to dedicated fax number below belonging to the campus where the patient is receiving treatment. List of dedicated fax numbers for the Facilities:  FACILITY NAME UR FAX NUMBER   ADMISSION DENIALS (Administrative/Medical Necessity) [phone]   DISCHARGE SUPPORT TEAM (NETWORK) [phone]   PARENT CHILD HEALTH (Maternity/NICU/Pediatrics)  [phone]   [facility] [phone]   [facility] [phone]   [facility] [phone]   [facility] [phone]   [facility] [phone]   [facility] [phone]   [facility] [phone]   [facility] [phone]   [facility] [phone]   Critical access hospital [phone]   [facility] [phone]   [facility] [phone]

## 2024-09-14 LAB
ALBUMIN SERPL BCG-MCNC: 3.7 G/DL (ref 3.5–5)
ALP SERPL-CCNC: 31 U/L (ref 34–104)
ALT SERPL W P-5'-P-CCNC: 15 U/L (ref 7–52)
ANION GAP SERPL CALCULATED.3IONS-SCNC: 8 MMOL/L (ref 4–13)
AST SERPL W P-5'-P-CCNC: 13 U/L (ref 13–39)
BILIRUB SERPL-MCNC: 0.21 MG/DL (ref 0.2–1)
BUN SERPL-MCNC: 19 MG/DL (ref 5–25)
CALCIUM SERPL-MCNC: 8.6 MG/DL (ref 8.4–10.2)
CHLORIDE SERPL-SCNC: 110 MMOL/L (ref 96–108)
CO2 SERPL-SCNC: 21 MMOL/L (ref 21–32)
CREAT SERPL-MCNC: 0.83 MG/DL (ref 0.6–1.3)
GFR SERPL CREATININE-BSD FRML MDRD: 77 ML/MIN/1.73SQ M
GLUCOSE SERPL-MCNC: 148 MG/DL (ref 65–140)
MAGNESIUM SERPL-MCNC: 2.2 MG/DL (ref 1.9–2.7)
PHOSPHATE SERPL-MCNC: 2.1 MG/DL (ref 2.7–4.5)
POTASSIUM SERPL-SCNC: 4.2 MMOL/L (ref 3.5–5.3)
PROT SERPL-MCNC: 6.9 G/DL (ref 6.4–8.4)
SODIUM SERPL-SCNC: 139 MMOL/L (ref 135–147)

## 2024-09-14 PROCEDURE — 80053 COMPREHEN METABOLIC PANEL: CPT | Performed by: UROLOGY

## 2024-09-14 PROCEDURE — 84100 ASSAY OF PHOSPHORUS: CPT | Performed by: UROLOGY

## 2024-09-14 PROCEDURE — 83735 ASSAY OF MAGNESIUM: CPT | Performed by: UROLOGY

## 2024-09-14 PROCEDURE — 99232 SBSQ HOSP IP/OBS MODERATE 35: CPT | Performed by: PHYSICIAN ASSISTANT

## 2024-09-14 RX ORDER — LANOLIN ALCOHOL/MO/W.PET/CERES
9 CREAM (GRAM) TOPICAL
Status: DISCONTINUED | OUTPATIENT
Start: 2024-09-14 | End: 2024-09-15 | Stop reason: HOSPADM

## 2024-09-14 RX ORDER — TEMAZEPAM 7.5 MG/1
7.5 CAPSULE ORAL
Status: DISCONTINUED | OUTPATIENT
Start: 2024-09-14 | End: 2024-09-15 | Stop reason: HOSPADM

## 2024-09-14 RX ORDER — ONDANSETRON 2 MG/ML
4 INJECTION INTRAMUSCULAR; INTRAVENOUS EVERY 4 HOURS PRN
Status: DISCONTINUED | OUTPATIENT
Start: 2024-09-14 | End: 2024-09-15 | Stop reason: HOSPADM

## 2024-09-14 RX ADMIN — SODIUM CHLORIDE 125 ML/HR: 0.9 INJECTION, SOLUTION INTRAVENOUS at 20:52

## 2024-09-14 RX ADMIN — FAMOTIDINE 10 MG: 20 TABLET, FILM COATED ORAL at 08:58

## 2024-09-14 RX ADMIN — GABAPENTIN 200 MG: 100 CAPSULE ORAL at 23:56

## 2024-09-14 RX ADMIN — DEXTROSE 1000 MG: 50 INJECTION, SOLUTION INTRAVENOUS at 20:51

## 2024-09-14 RX ADMIN — LOSARTAN POTASSIUM 25 MG: 25 TABLET, FILM COATED ORAL at 08:58

## 2024-09-14 RX ADMIN — ONDANSETRON 4 MG: 2 INJECTION INTRAMUSCULAR; INTRAVENOUS at 09:17

## 2024-09-14 RX ADMIN — DULOXETINE HYDROCHLORIDE 60 MG: 60 CAPSULE, DELAYED RELEASE ORAL at 23:56

## 2024-09-14 RX ADMIN — ACETAMINOPHEN 650 MG: 325 TABLET ORAL at 20:51

## 2024-09-14 RX ADMIN — ACETAMINOPHEN 650 MG: 325 TABLET ORAL at 02:05

## 2024-09-14 RX ADMIN — SODIUM CHLORIDE 125 ML/HR: 0.9 INJECTION, SOLUTION INTRAVENOUS at 03:31

## 2024-09-14 RX ADMIN — TEMAZEPAM 7.5 MG: 7.5 CAPSULE ORAL at 03:31

## 2024-09-14 RX ADMIN — ENOXAPARIN SODIUM 40 MG: 40 INJECTION SUBCUTANEOUS at 08:58

## 2024-09-14 RX ADMIN — Medication 9 MG: at 23:56

## 2024-09-14 NOTE — TELEPHONE ENCOUNTER
Status post stent with Dr. Parnell on-call for infected stone.  Will require second definitive stone treatment on outpatient please assist with arranging once discharged.

## 2024-09-14 NOTE — PROGRESS NOTES
Progress Note - Hospitalist   Name: Maria Luz Ponce 59 y.o. female I MRN: [id number]  Unit/Bed#: E5 -01 I Date of Admission: 9/12/2024   Date of Service: 9/14/2024 I Hospital Day: 2    Assessment & Plan  Nephrolithiasis  -CAT scan shows 4 mm left UPJ calculus with mild associated hydronephrosis  -Associated urinary tract infection urine positive  -Urology consulted.  Status post cystoscopy with ureteral stent placement  -As needed morphine for pain  -Continue IV ceftriaxone  -Sepsis with leukocytosis and tachycardia on arrival, secondary to infected kidney stone  -Follow urine culture -growing gram-negative rods    Hypertension  -Continue home dose of valsartan  Mixed hyperlipidemia  Not on statin  GERD (gastroesophageal reflux disease)  -Continue home dose of Pepcid    DVT prophylaxis Lovenox 40 a day  Continue home dose of gabapentin  Cervical spinal stenosis    Cyst of left ovary  Outpatient follow-up with OB/GYN  Left ureteral calculus  Status post hysteroscopy plus stent  Outpatient follow-up with urology    VTE Pharmacologic Prophylaxis:   Moderate Risk (Score 3-4) - Pharmacological DVT Prophylaxis Ordered: enoxaparin (Lovenox).    Mobility:   Basic Mobility Inpatient Raw Score: 24  JH-HLM Goal: 8: Walk 250 feet or more  JH-HLM Achieved: 7: Walk 25 feet or more  JH-HLM Goal NOT achieved. Continue with multidisciplinary rounding and encourage appropriate mobility to improve upon JH-HLM goals.    Patient Centered Rounds: I performed bedside rounds with nursing staff today.   Discussions with Specialists or Other Care Team Provider: Urology    Education and Discussions with Family / Patient: Updated  () at bedside.    Current Length of Stay: 2 day(s)  Current Patient Status: Inpatient   Certification Statement: The patient will continue to require additional inpatient hospital stay due to urine cultures  Discharge Plan: Anticipate discharge tomorrow to home.    Code Status: Level 1 -  Full Code    Subjective    Patient reports that she did not sleep well secondary to stress from her ovarian cyst.  Additionally, she has lack of appetite, however no overt nausea/vomiting.  Discussed discharge today versus tomorrow.    Objective     Vitals:   Temp (24hrs), Av.8 °F (36.6 °C), Min:97.3 °F (36.3 °C), Max:98.2 °F (36.8 °C)    Temp:  [97.3 °F (36.3 °C)-98.2 °F (36.8 °C)] 98.1 °F (36.7 °C)  HR:  [62-76] 70  Resp:  [16-18] 16  BP: (104-140)/(59-77) 115/65  SpO2:  [91 %-98 %] 93 %  Body mass index is 35.26 kg/m².     Input and Output Summary (last 24 hours):     Intake/Output Summary (Last 24 hours) at 2024 1135  Last data filed at 2024 2324  Gross per 24 hour   Intake 650 ml   Output 300 ml   Net 350 ml       Physical Exam  Vitals and nursing note reviewed.   Constitutional:       General: She is not in acute distress.     Appearance: Normal appearance.   HENT:      Head: Normocephalic.      Nose: Nose normal.      Mouth/Throat:      Mouth: Mucous membranes are moist.      Pharynx: Oropharynx is clear.   Eyes:      Extraocular Movements: Extraocular movements intact.      Pupils: Pupils are equal, round, and reactive to light.   Cardiovascular:      Rate and Rhythm: Normal rate and regular rhythm.      Pulses: Normal pulses.      Heart sounds: Normal heart sounds.   Pulmonary:      Effort: Pulmonary effort is normal.      Breath sounds: Normal breath sounds. No wheezing, rhonchi or rales.   Abdominal:      General: Abdomen is flat. Bowel sounds are normal. There is no distension.      Palpations: Abdomen is soft.      Tenderness: There is no abdominal tenderness.   Musculoskeletal:      Cervical back: Normal range of motion.      Right lower leg: No edema.      Left lower leg: No edema.   Skin:     General: Skin is warm and dry.      Findings: No rash.   Neurological:      General: No focal deficit present.      Mental Status: She is alert and oriented to person, place, and time. Mental  status is at baseline.      Cranial Nerves: No cranial nerve deficit.      Sensory: No sensory deficit.          Lines/Drains:  Lines/Drains/Airways       Active Status       Name Placement date Placement time Site Days    Ureteral Internal Stent Left ureter 6 Fr. 09/13/24 1817  Left ureter  less than 1                            Lab Results: I have reviewed the following results:    Results from last 7 days   Lab Units 09/13/24  0435 09/12/24  1814   WBC Thousand/uL  --  12.21*   HEMOGLOBIN g/dL  --  13.9   HEMATOCRIT %  --  41.1   PLATELETS Thousands/uL 223 249   SEGS PCT %  --  70   LYMPHO PCT %  --  15   MONO PCT %  --  14*   EOS PCT %  --  0     Results from last 7 days   Lab Units 09/14/24  0440   SODIUM mmol/L 139   POTASSIUM mmol/L 4.2   CHLORIDE mmol/L 110*   CO2 mmol/L 21   BUN mg/dL 19   CREATININE mg/dL 0.83   ANION GAP mmol/L 8   CALCIUM mg/dL 8.6   ALBUMIN g/dL 3.7   TOTAL BILIRUBIN mg/dL 0.21   ALK PHOS U/L 31*   ALT U/L 15   AST U/L 13   GLUCOSE RANDOM mg/dL 148*                 Results from last 7 days   Lab Units 09/12/24  2138 09/12/24  1814   LACTIC ACID mmol/L 0.6  --    PROCALCITONIN ng/ml  --  0.54*       Recent Cultures (last 7 days):   Results from last 7 days   Lab Units 09/12/24  2138 09/12/24  2135   BLOOD CULTURE  No Growth at 24 hrs. No Growth at 24 hrs.   URINE CULTURE  >100,000 cfu/ml Gram Negative Koby Enteric Like*  --        Imaging Review: No pertinent imaging studies reviewed.  Other Studies: No additional pertinent studies reviewed.    Last 24 Hours Medication List:     Current Facility-Administered Medications:     acetaminophen (TYLENOL) tablet 650 mg, Q6H PRN    cefTRIAXone (ROCEPHIN) 1,000 mg in dextrose 5 % 50 mL IVPB, Q24H    DULoxetine (CYMBALTA) delayed release capsule 60 mg, Daily    enoxaparin (LOVENOX) subcutaneous injection 40 mg, Daily    famotidine (PEPCID) tablet 10 mg, Daily    gabapentin (NEURONTIN) capsule 200 mg, HS    iohexol (OMNIPAQUE) 300 mg/mL injection  50 mL, Once in imaging    lactated ringers bolus 1,000 mL, Once PRN **AND** lactated ringers bolus 1,000 mL, Once PRN    losartan (COZAAR) tablet 25 mg, Daily    melatonin tablet 9 mg, HS    morphine injection 2 mg, Q6H PRN    ondansetron (ZOFRAN) injection 4 mg, Q4H PRN    sodium chloride 0.9 % bolus 1,000 mL, Once PRN **AND** sodium chloride 0.9 % bolus 1,000 mL, Once PRN    sodium chloride 0.9 % infusion, Continuous, Last Rate: 125 mL/hr (09/13/24 1946)    temazepam (RESTORIL) capsule 7.5 mg, HS PRN    Administrative Statements   Today, Patient Was Seen By: Sorin Santos PA-C      **Please Note: This note may have been constructed using a voice recognition system.**

## 2024-09-14 NOTE — ASSESSMENT & PLAN NOTE
-CAT scan shows 4 mm left UPJ calculus with mild associated hydronephrosis  -Associated urinary tract infection urine positive  -Urology consulted.  Status post cystoscopy with ureteral stent placement  -As needed morphine for pain  -Continue IV ceftriaxone  -Sepsis with leukocytosis and tachycardia on arrival, secondary to infected kidney stone  -Follow urine culture -growing gram-negative rods

## 2024-09-14 NOTE — PLAN OF CARE
Problem: PAIN - ADULT  Goal: Verbalizes/displays adequate comfort level or baseline comfort level  Description: Interventions:  - Encourage patient to monitor pain and request assistance  - Assess pain using appropriate pain scale  - Administer analgesics based on type and severity of pain and evaluate response  - Implement non-pharmacological measures as appropriate and evaluate response  - Consider cultural and social influences on pain and pain management  - Notify physician/advanced practitioner if interventions unsuccessful or patient reports new pain  Outcome: Progressing     Problem: INFECTION - ADULT  Goal: Absence or prevention of progression during hospitalization  Description: INTERVENTIONS:  - Assess and monitor for signs and symptoms of infection  - Monitor lab/diagnostic results  - Monitor all insertion sites, i.e. indwelling lines, tubes, and drains  - Monitor endotracheal if appropriate and nasal secretions for changes in amount and color  - [place] appropriate cooling/warming therapies per order  - Administer medications as ordered  - Instruct and encourage patient and family to use good hand hygiene technique  - Identify and instruct in appropriate isolation precautions for identified infection/condition  Outcome: Progressing     Problem: SAFETY ADULT  Goal: Patient will remain free of falls  Description: INTERVENTIONS:  - Educate patient/family on patient safety including physical limitations  - Instruct patient to call for assistance with activity   - Consult OT/PT to assist with strengthening/mobility   - Keep Call bell within reach  - Keep bed low and locked with side rails adjusted as appropriate  - Keep care items and personal belongings within reach  - Initiate and maintain comfort rounds  - Make Fall Risk Sign visible to staff  - Apply yellow socks and bracelet for high fall risk patients  - Consider moving patient to room near nurses station  Outcome: Progressing  Goal: Maintain or  return to baseline ADL function  Description: INTERVENTIONS:  -  Assess patient's ability to carry out ADLs; assess patient's baseline for ADL function and identify physical deficits which impact ability to perform ADLs (bathing, care of mouth/teeth, toileting, grooming, dressing, etc.)  - Assess/evaluate cause of self-care deficits   - Assess range of motion  - Assess patient's mobility; develop plan if impaired  - Assess patient's need for assistive devices and provide as appropriate  - Encourage maximum independence but intervene and supervise when necessary  - Involve family in performance of ADLs  - Assess for home care needs following discharge   - Consider OT consult to assist with ADL evaluation and planning for discharge  - Provide patient education as appropriate  Outcome: Progressing     Problem: DISCHARGE PLANNING  Goal: Discharge to home or other facility with appropriate resources  Description: INTERVENTIONS:  - Identify barriers to discharge w/patient and caregiver  - Arrange for needed discharge resources and transportation as appropriate  - Identify discharge learning needs (meds, wound care, etc.)  - Arrange for interpretive services to assist at discharge as needed  - Refer to Case Management Department for coordinating discharge planning if the patient needs post-hospital services based on physician/advanced practitioner order or complex needs related to functional status, cognitive ability, or social support system  Outcome: Progressing     Problem: Knowledge Deficit  Goal: Patient/family/caregiver demonstrates understanding of disease process, treatment plan, medications, and discharge instructions  Description: Complete learning assessment and assess knowledge base.  Interventions:  - Provide teaching at level of understanding  - Provide teaching via preferred learning methods  Outcome: Progressing     Problem: CARDIOVASCULAR - ADULT  Goal: Maintains optimal cardiac output and hemodynamic  stability  Description: INTERVENTIONS:  - Monitor I/O, vital signs and rhythm  - Monitor for S/S and trends of decreased cardiac output  - Administer and titrate ordered vasoactive medications to optimize hemodynamic stability  - Assess quality of pulses, skin color and temperature  - Assess for signs of decreased coronary artery perfusion  - Instruct patient to report change in severity of symptoms  Outcome: Progressing     Problem: RESPIRATORY - ADULT  Goal: Achieves optimal ventilation and oxygenation  Description: INTERVENTIONS:  - Assess for changes in respiratory status  - Assess for changes in mentation and behavior  - Position to facilitate oxygenation and minimize respiratory effort  - Oxygen administered by appropriate delivery if ordered  - Initiate smoking cessation education as indicated  - Encourage broncho-pulmonary hygiene including cough, deep breathe, Incentive Spirometry  - Assess the need for suctioning and aspirate as needed  - Assess and instruct to report SOB or any respiratory difficulty  - Respiratory Therapy support as indicated  Outcome: Progressing     Problem: GASTROINTESTINAL - ADULT  Goal: Minimal or absence of nausea and/or vomiting  Description: INTERVENTIONS:  - Administer IV fluids if ordered to ensure adequate hydration  - Maintain NPO status until nausea and vomiting are resolved  - Nasogastric tube if ordered  - Administer ordered antiemetic medications as needed  - Provide nonpharmacologic comfort measures as appropriate  - Advance diet as tolerated, if ordered  - Consider nutrition services referral to assist patient with adequate nutrition and appropriate food choices  Outcome: Progressing  Goal: Maintains or returns to baseline bowel function  Description: INTERVENTIONS:  - Assess bowel function  - Encourage oral fluids to ensure adequate hydration  - Administer IV fluids if ordered to ensure adequate hydration  - Administer ordered medications as needed  - Encourage  mobilization and activity  - Consider nutritional services referral to assist patient with adequate nutrition and appropriate food choices  Outcome: Progressing  Goal: Maintains adequate nutritional intake  Description: INTERVENTIONS:  - Monitor percentage of each meal consumed  - Identify factors contributing to decreased intake, treat as appropriate  - Assist with meals as needed  - Monitor I&O, weight, and lab values if indicated  - Obtain nutrition services referral as needed  Outcome: Progressing     Problem: GENITOURINARY - ADULT  Goal: Maintains or returns to baseline urinary function  Description: INTERVENTIONS:  - Assess urinary function  - Encourage oral fluids to ensure adequate hydration if ordered  - Administer IV fluids as ordered to ensure adequate hydration  - Administer ordered medications as needed  - Offer frequent toileting  - Follow urinary retention protocol if ordered  Outcome: Progressing  Goal: Absence of urinary retention  Description: INTERVENTIONS:  - Assess patient’s ability to void and empty bladder  - Monitor I/O  - Bladder scan as needed  - Discuss with physician/AP medications to alleviate retention as needed  - Discuss catheterization for long term situations as appropriate  Outcome: Progressing     Problem: METABOLIC, FLUID AND ELECTROLYTES - ADULT  Goal: Electrolytes maintained within normal limits  Description: INTERVENTIONS:  - Monitor labs and assess patient for signs and symptoms of electrolyte imbalances  - Administer electrolyte replacement as ordered  - Monitor response to electrolyte replacements, including repeat lab results as appropriate  - Instruct patient on fluid and nutrition as appropriate  Outcome: Progressing  Goal: Fluid balance maintained  Description: INTERVENTIONS:  - Monitor labs   - Monitor I/O and WT  - Instruct patient on fluid and nutrition as appropriate  - Assess for signs & symptoms of volume excess or deficit  Outcome: Progressing     Problem:  HEMATOLOGIC - ADULT  Goal: Maintains hematologic stability  Description: INTERVENTIONS  - Assess for signs and symptoms of bleeding or hemorrhage  - Monitor labs  - Administer supportive blood products/factors as ordered and appropriate  Outcome: Progressing

## 2024-09-14 NOTE — PLAN OF CARE
Problem: PAIN - ADULT  Goal: Verbalizes/displays adequate comfort level or baseline comfort level  Description: Interventions:  - Encourage patient to monitor pain and request assistance  - Assess pain using appropriate pain scale  - Administer analgesics based on type and severity of pain and evaluate response  - Implement non-pharmacological measures as appropriate and evaluate response  - Consider cultural and social influences on pain and pain management  - Notify physician/advanced practitioner if interventions unsuccessful or patient reports new pain  Outcome: Progressing     Problem: INFECTION - ADULT  Goal: Absence or prevention of progression during hospitalization  Description: INTERVENTIONS:  - Assess and monitor for signs and symptoms of infection  - Monitor lab/diagnostic results  - Monitor all insertion sites, i.e. indwelling lines, tubes, and drains  - Monitor endotracheal if appropriate and nasal secretions for changes in amount and color  - [place] appropriate cooling/warming therapies per order  - Administer medications as ordered  - Instruct and encourage patient and family to use good hand hygiene technique  - Identify and instruct in appropriate isolation precautions for identified infection/condition  Outcome: Progressing     Problem: SAFETY ADULT  Goal: Patient will remain free of falls  Description: INTERVENTIONS:  - Educate patient/family on patient safety including physical limitations  - Instruct patient to call for assistance with activity   - Consult OT/PT to assist with strengthening/mobility   - Keep Call bell within reach  - Keep bed low and locked with side rails adjusted as appropriate  - Keep care items and personal belongings within reach  - Initiate and maintain comfort rounds  - Make Fall Risk Sign visible to staff  - Apply yellow socks and bracelet for high fall risk patients  - Consider moving patient to room near nurses station  Outcome: Progressing  Goal: Maintain or  return to baseline ADL function  Description: INTERVENTIONS:  -  Assess patient's ability to carry out ADLs; assess patient's baseline for ADL function and identify physical deficits which impact ability to perform ADLs (bathing, care of mouth/teeth, toileting, grooming, dressing, etc.)  - Assess/evaluate cause of self-care deficits   - Assess range of motion  - Assess patient's mobility; develop plan if impaired  - Assess patient's need for assistive devices and provide as appropriate  - Encourage maximum independence but intervene and supervise when necessary  - Involve family in performance of ADLs  - Assess for home care needs following discharge   - Consider OT consult to assist with ADL evaluation and planning for discharge  - Provide patient education as appropriate  Outcome: Progressing     Problem: DISCHARGE PLANNING  Goal: Discharge to home or other facility with appropriate resources  Description: INTERVENTIONS:  - Identify barriers to discharge w/patient and caregiver  - Arrange for needed discharge resources and transportation as appropriate  - Identify discharge learning needs (meds, wound care, etc.)  - Arrange for interpretive services to assist at discharge as needed  - Refer to Case Management Department for coordinating discharge planning if the patient needs post-hospital services based on physician/advanced practitioner order or complex needs related to functional status, cognitive ability, or social support system  Outcome: Progressing     Problem: Knowledge Deficit  Goal: Patient/family/caregiver demonstrates understanding of disease process, treatment plan, medications, and discharge instructions  Description: Complete learning assessment and assess knowledge base.  Interventions:  - Provide teaching at level of understanding  - Provide teaching via preferred learning methods  Outcome: Progressing     Problem: CARDIOVASCULAR - ADULT  Goal: Maintains optimal cardiac output and hemodynamic  stability  Description: INTERVENTIONS:  - Monitor I/O, vital signs and rhythm  - Monitor for S/S and trends of decreased cardiac output  - Administer and titrate ordered vasoactive medications to optimize hemodynamic stability  - Assess quality of pulses, skin color and temperature  - Assess for signs of decreased coronary artery perfusion  - Instruct patient to report change in severity of symptoms  Outcome: Progressing     Problem: RESPIRATORY - ADULT  Goal: Achieves optimal ventilation and oxygenation  Description: INTERVENTIONS:  - Assess for changes in respiratory status  - Assess for changes in mentation and behavior  - Position to facilitate oxygenation and minimize respiratory effort  - Oxygen administered by appropriate delivery if ordered  - Initiate smoking cessation education as indicated  - Encourage broncho-pulmonary hygiene including cough, deep breathe, Incentive Spirometry  - Assess the need for suctioning and aspirate as needed  - Assess and instruct to report SOB or any respiratory difficulty  - Respiratory Therapy support as indicated  Outcome: Progressing     Problem: GASTROINTESTINAL - ADULT  Goal: Minimal or absence of nausea and/or vomiting  Description: INTERVENTIONS:  - Administer IV fluids if ordered to ensure adequate hydration  - Maintain NPO status until nausea and vomiting are resolved  - Nasogastric tube if ordered  - Administer ordered antiemetic medications as needed  - Provide nonpharmacologic comfort measures as appropriate  - Advance diet as tolerated, if ordered  - Consider nutrition services referral to assist patient with adequate nutrition and appropriate food choices  Outcome: Progressing  Goal: Maintains or returns to baseline bowel function  Description: INTERVENTIONS:  - Assess bowel function  - Encourage oral fluids to ensure adequate hydration  - Administer IV fluids if ordered to ensure adequate hydration  - Administer ordered medications as needed  - Encourage  mobilization and activity  - Consider nutritional services referral to assist patient with adequate nutrition and appropriate food choices  Outcome: Progressing  Goal: Maintains adequate nutritional intake  Description: INTERVENTIONS:  - Monitor percentage of each meal consumed  - Identify factors contributing to decreased intake, treat as appropriate  - Assist with meals as needed  - Monitor I&O, weight, and lab values if indicated  - Obtain nutrition services referral as needed  Outcome: Progressing     Problem: GENITOURINARY - ADULT  Goal: Maintains or returns to baseline urinary function  Description: INTERVENTIONS:  - Assess urinary function  - Encourage oral fluids to ensure adequate hydration if ordered  - Administer IV fluids as ordered to ensure adequate hydration  - Administer ordered medications as needed  - Offer frequent toileting  - Follow urinary retention protocol if ordered  Outcome: Progressing  Goal: Absence of urinary retention  Description: INTERVENTIONS:  - Assess patient’s ability to void and empty bladder  - Monitor I/O  - Bladder scan as needed  - Discuss with physician/AP medications to alleviate retention as needed  - Discuss catheterization for long term situations as appropriate  Outcome: Progressing     Problem: METABOLIC, FLUID AND ELECTROLYTES - ADULT  Goal: Electrolytes maintained within normal limits  Description: INTERVENTIONS:  - Monitor labs and assess patient for signs and symptoms of electrolyte imbalances  - Administer electrolyte replacement as ordered  - Monitor response to electrolyte replacements, including repeat lab results as appropriate  - Instruct patient on fluid and nutrition as appropriate  Outcome: Progressing  Goal: Fluid balance maintained  Description: INTERVENTIONS:  - Monitor labs   - Monitor I/O and WT  - Instruct patient on fluid and nutrition as appropriate  - Assess for signs & symptoms of volume excess or deficit  Outcome: Progressing     Problem:  HEMATOLOGIC - ADULT  Goal: Maintains hematologic stability  Description: INTERVENTIONS  - Assess for signs and symptoms of bleeding or hemorrhage  - Monitor labs  - Administer supportive blood products/factors as ordered and appropriate  Outcome: Progressing

## 2024-09-14 NOTE — DISCHARGE INSTR - AVS FIRST PAGE
You underwent cystoscopy with stent placement for management of ureteral stone.  The stone was not removed to due to questionable infection and cloudy urine intraoperatively.  This means that you will require a second stone procedure at a later date.  Our office will contact you to schedule.    Things to expect with the stent include frequency, urgency, flank pain with urination and blood in the urine.  Typically anticipate postoperatively but can have intermittently as long as ureteral stent is in place and varying degrees such as pink, brown or bright red.  Stay well-hydrated drink lots of fluids and this should improve on its own.

## 2024-09-14 NOTE — UTILIZATION REVIEW
Notification of Unplanned, Urgent, or   Emergency Inpatient Admission   AUTHORIZATION REQUEST   [place] [address]  Tax ID: [id number]  NPI: [id number]  [facility]: [facility]  Admission Level of Care: Inpatient  Place of Service Code: 21     Attending Physician Information  Attending Name and NPI#: Harrison Magdi Torres Do [[id number]]  Phone: [phone]     Admission Information  Inpatient Admission Date/Time: 9/12/24  9:12 PM  Discharge Date/Time: No discharge date for patient encounter.  Admitting Diagnosis Code/Description:  Low back pain [M54.50]  Back pain [M54.9]  Urolithiasis [N20.9]  Pyelonephritis [N12]  Cyst of left ovary [N83.202]  Left ureteral calculus [N20.1]  Abnormal ultrasound of pelvis [R93.89]     Utilization Review Contact  Nyaana Patel, Utilization   Phone: [phone]  Fax: [phone]  Email: [email or web address]  Contact for approvals/pending authorizations, clinical reviews, and discharge.     Physician Advisory Services Contact  Medical Necessity Denial & Rarb-qh-Zpxb Discussion  Phone: [phone]  Fax: [phone]  Email: [email or web address]     DISCHARGE SUPPORT TEAM:  For Patients Discharge Needs & Updates  Phone: [phone] opt. [phone] Fax: [phone]  Email: [email or web address]

## 2024-09-15 VITALS
HEIGHT: 65 IN | TEMPERATURE: 98.6 F | BODY MASS INDEX: 35.3 KG/M2 | WEIGHT: 211.86 LBS | DIASTOLIC BLOOD PRESSURE: 66 MMHG | SYSTOLIC BLOOD PRESSURE: 98 MMHG | HEART RATE: 73 BPM | OXYGEN SATURATION: 93 % | RESPIRATION RATE: 16 BRPM

## 2024-09-15 LAB
ALBUMIN SERPL BCG-MCNC: 3.5 G/DL (ref 3.5–5)
ALP SERPL-CCNC: 25 U/L (ref 34–104)
ALT SERPL W P-5'-P-CCNC: 17 U/L (ref 7–52)
ANION GAP SERPL CALCULATED.3IONS-SCNC: 5 MMOL/L (ref 4–13)
AST SERPL W P-5'-P-CCNC: 15 U/L (ref 13–39)
BACTERIA UR CULT: ABNORMAL
BACTERIA UR CULT: ABNORMAL
BILIRUB SERPL-MCNC: 0.21 MG/DL (ref 0.2–1)
BUN SERPL-MCNC: 9 MG/DL (ref 5–25)
CALCIUM SERPL-MCNC: 8.4 MG/DL (ref 8.4–10.2)
CHLORIDE SERPL-SCNC: 111 MMOL/L (ref 96–108)
CO2 SERPL-SCNC: 25 MMOL/L (ref 21–32)
CREAT SERPL-MCNC: 0.78 MG/DL (ref 0.6–1.3)
GFR SERPL CREATININE-BSD FRML MDRD: 83 ML/MIN/1.73SQ M
GLUCOSE SERPL-MCNC: 97 MG/DL (ref 65–140)
MAGNESIUM SERPL-MCNC: 2 MG/DL (ref 1.9–2.7)
PHOSPHATE SERPL-MCNC: 2.6 MG/DL (ref 2.7–4.5)
POTASSIUM SERPL-SCNC: 3.6 MMOL/L (ref 3.5–5.3)
PROT SERPL-MCNC: 6.4 G/DL (ref 6.4–8.4)
SODIUM SERPL-SCNC: 141 MMOL/L (ref 135–147)

## 2024-09-15 PROCEDURE — 99239 HOSP IP/OBS DSCHRG MGMT >30: CPT | Performed by: PHYSICIAN ASSISTANT

## 2024-09-15 PROCEDURE — 84100 ASSAY OF PHOSPHORUS: CPT | Performed by: UROLOGY

## 2024-09-15 PROCEDURE — 80053 COMPREHEN METABOLIC PANEL: CPT | Performed by: UROLOGY

## 2024-09-15 PROCEDURE — 83735 ASSAY OF MAGNESIUM: CPT | Performed by: UROLOGY

## 2024-09-15 RX ORDER — ONDANSETRON 4 MG/1
4 TABLET, FILM COATED ORAL EVERY 8 HOURS PRN
Qty: 20 TABLET | Refills: 0 | Status: SHIPPED | OUTPATIENT
Start: 2024-09-15 | End: 2024-09-15

## 2024-09-15 RX ORDER — ALPRAZOLAM 0.5 MG
0.5 TABLET ORAL
Qty: 5 TABLET | Refills: 0 | Status: SHIPPED | OUTPATIENT
Start: 2024-09-15 | End: 2024-09-15

## 2024-09-15 RX ORDER — CEFDINIR 300 MG/1
300 CAPSULE ORAL EVERY 12 HOURS SCHEDULED
Qty: 14 CAPSULE | Refills: 0 | Status: SHIPPED | OUTPATIENT
Start: 2024-09-15 | End: 2024-09-15

## 2024-09-15 RX ORDER — ALPRAZOLAM 0.5 MG
0.5 TABLET ORAL
Qty: 5 TABLET | Refills: 0 | Status: SHIPPED | OUTPATIENT
Start: 2024-09-15 | End: 2024-09-25

## 2024-09-15 RX ORDER — ONDANSETRON 4 MG/1
4 TABLET, FILM COATED ORAL EVERY 8 HOURS PRN
Qty: 20 TABLET | Refills: 0 | Status: SHIPPED | OUTPATIENT
Start: 2024-09-15

## 2024-09-15 RX ORDER — CEFDINIR 300 MG/1
300 CAPSULE ORAL EVERY 12 HOURS SCHEDULED
Qty: 14 CAPSULE | Refills: 0 | Status: SHIPPED | OUTPATIENT
Start: 2024-09-15 | End: 2024-09-22

## 2024-09-15 RX ADMIN — SODIUM CHLORIDE 125 ML/HR: 0.9 INJECTION, SOLUTION INTRAVENOUS at 06:43

## 2024-09-15 RX ADMIN — FAMOTIDINE 10 MG: 20 TABLET, FILM COATED ORAL at 10:12

## 2024-09-15 RX ADMIN — ACETAMINOPHEN 650 MG: 325 TABLET ORAL at 06:41

## 2024-09-15 NOTE — ASSESSMENT & PLAN NOTE
-CAT scan shows 4 mm left UPJ calculus with mild associated hydronephrosis  -Associated urinary tract infection urine positive  -Urology consulted.  Status post cystoscopy with ureteral stent placement  -Received IV ceftriaxone x 3 days, discharged on Omnicef x 7 days for total 10-day course of antibiotic.  -Sepsis with leukocytosis and tachycardia on arrival, secondary to infected kidney stone  -Urine culture growing E. coli

## 2024-09-15 NOTE — PLAN OF CARE
Problem: PAIN - ADULT  Goal: Verbalizes/displays adequate comfort level or baseline comfort level  Description: Interventions:  - Encourage patient to monitor pain and request assistance  - Assess pain using appropriate pain scale  - Administer analgesics based on type and severity of pain and evaluate response  - Implement non-pharmacological measures as appropriate and evaluate response  - Consider cultural and social influences on pain and pain management  - Notify physician/advanced practitioner if interventions unsuccessful or patient reports new pain  Outcome: Progressing     Problem: INFECTION - ADULT  Goal: Absence or prevention of progression during hospitalization  Description: INTERVENTIONS:  - Assess and monitor for signs and symptoms of infection  - Monitor lab/diagnostic results  - Monitor all insertion sites, i.e. indwelling lines, tubes, and drains  - Monitor endotracheal if appropriate and nasal secretions for changes in amount and color  - [place] appropriate cooling/warming therapies per order  - Administer medications as ordered  - Instruct and encourage patient and family to use good hand hygiene technique  - Identify and instruct in appropriate isolation precautions for identified infection/condition  Outcome: Progressing     Problem: SAFETY ADULT  Goal: Patient will remain free of falls  Description: INTERVENTIONS:  - Educate patient/family on patient safety including physical limitations  - Instruct patient to call for assistance with activity   - Consult OT/PT to assist with strengthening/mobility   - Keep Call bell within reach  - Keep bed low and locked with side rails adjusted as appropriate  - Keep care items and personal belongings within reach  - Initiate and maintain comfort rounds  - Make Fall Risk Sign visible to staff  - Apply yellow socks and bracelet for high fall risk patients  - Consider moving patient to room near nurses station  Outcome: Progressing  Goal: Maintain or  return to baseline ADL function  Description: INTERVENTIONS:  -  Assess patient's ability to carry out ADLs; assess patient's baseline for ADL function and identify physical deficits which impact ability to perform ADLs (bathing, care of mouth/teeth, toileting, grooming, dressing, etc.)  - Assess/evaluate cause of self-care deficits   - Assess range of motion  - Assess patient's mobility; develop plan if impaired  - Assess patient's need for assistive devices and provide as appropriate  - Encourage maximum independence but intervene and supervise when necessary  - Involve family in performance of ADLs  - Assess for home care needs following discharge   - Consider OT consult to assist with ADL evaluation and planning for discharge  - Provide patient education as appropriate  Outcome: Progressing     Problem: DISCHARGE PLANNING  Goal: Discharge to home or other facility with appropriate resources  Description: INTERVENTIONS:  - Identify barriers to discharge w/patient and caregiver  - Arrange for needed discharge resources and transportation as appropriate  - Identify discharge learning needs (meds, wound care, etc.)  - Arrange for interpretive services to assist at discharge as needed  - Refer to Case Management Department for coordinating discharge planning if the patient needs post-hospital services based on physician/advanced practitioner order or complex needs related to functional status, cognitive ability, or social support system  Outcome: Progressing     Problem: Knowledge Deficit  Goal: Patient/family/caregiver demonstrates understanding of disease process, treatment plan, medications, and discharge instructions  Description: Complete learning assessment and assess knowledge base.  Interventions:  - Provide teaching at level of understanding  - Provide teaching via preferred learning methods  Outcome: Progressing     Problem: CARDIOVASCULAR - ADULT  Goal: Maintains optimal cardiac output and hemodynamic  stability  Description: INTERVENTIONS:  - Monitor I/O, vital signs and rhythm  - Monitor for S/S and trends of decreased cardiac output  - Administer and titrate ordered vasoactive medications to optimize hemodynamic stability  - Assess quality of pulses, skin color and temperature  - Assess for signs of decreased coronary artery perfusion  - Instruct patient to report change in severity of symptoms  Outcome: Progressing     Problem: RESPIRATORY - ADULT  Goal: Achieves optimal ventilation and oxygenation  Description: INTERVENTIONS:  - Assess for changes in respiratory status  - Assess for changes in mentation and behavior  - Position to facilitate oxygenation and minimize respiratory effort  - Oxygen administered by appropriate delivery if ordered  - Initiate smoking cessation education as indicated  - Encourage broncho-pulmonary hygiene including cough, deep breathe, Incentive Spirometry  - Assess the need for suctioning and aspirate as needed  - Assess and instruct to report SOB or any respiratory difficulty  - Respiratory Therapy support as indicated  Outcome: Progressing     Problem: GASTROINTESTINAL - ADULT  Goal: Minimal or absence of nausea and/or vomiting  Description: INTERVENTIONS:  - Administer IV fluids if ordered to ensure adequate hydration  - Maintain NPO status until nausea and vomiting are resolved  - Nasogastric tube if ordered  - Administer ordered antiemetic medications as needed  - Provide nonpharmacologic comfort measures as appropriate  - Advance diet as tolerated, if ordered  - Consider nutrition services referral to assist patient with adequate nutrition and appropriate food choices  Outcome: Progressing  Goal: Maintains or returns to baseline bowel function  Description: INTERVENTIONS:  - Assess bowel function  - Encourage oral fluids to ensure adequate hydration  - Administer IV fluids if ordered to ensure adequate hydration  - Administer ordered medications as needed  - Encourage  mobilization and activity  - Consider nutritional services referral to assist patient with adequate nutrition and appropriate food choices  Outcome: Progressing  Goal: Maintains adequate nutritional intake  Description: INTERVENTIONS:  - Monitor percentage of each meal consumed  - Identify factors contributing to decreased intake, treat as appropriate  - Assist with meals as needed  - Monitor I&O, weight, and lab values if indicated  - Obtain nutrition services referral as needed  Outcome: Progressing     Problem: GENITOURINARY - ADULT  Goal: Maintains or returns to baseline urinary function  Description: INTERVENTIONS:  - Assess urinary function  - Encourage oral fluids to ensure adequate hydration if ordered  - Administer IV fluids as ordered to ensure adequate hydration  - Administer ordered medications as needed  - Offer frequent toileting  - Follow urinary retention protocol if ordered  Outcome: Progressing  Goal: Absence of urinary retention  Description: INTERVENTIONS:  - Assess patient’s ability to void and empty bladder  - Monitor I/O  - Bladder scan as needed  - Discuss with physician/AP medications to alleviate retention as needed  - Discuss catheterization for long term situations as appropriate  Outcome: Progressing     Problem: METABOLIC, FLUID AND ELECTROLYTES - ADULT  Goal: Electrolytes maintained within normal limits  Description: INTERVENTIONS:  - Monitor labs and assess patient for signs and symptoms of electrolyte imbalances  - Administer electrolyte replacement as ordered  - Monitor response to electrolyte replacements, including repeat lab results as appropriate  - Instruct patient on fluid and nutrition as appropriate  Outcome: Progressing  Goal: Fluid balance maintained  Description: INTERVENTIONS:  - Monitor labs   - Monitor I/O and WT  - Instruct patient on fluid and nutrition as appropriate  - Assess for signs & symptoms of volume excess or deficit  Outcome: Progressing     Problem:  HEMATOLOGIC - ADULT  Goal: Maintains hematologic stability  Description: INTERVENTIONS  - Assess for signs and symptoms of bleeding or hemorrhage  - Monitor labs  - Administer supportive blood products/factors as ordered and appropriate  Outcome: Progressing

## 2024-09-15 NOTE — PLAN OF CARE
Problem: PAIN - ADULT  Goal: Verbalizes/displays adequate comfort level or baseline comfort level  Description: Interventions:  - Encourage patient to monitor pain and request assistance  - Assess pain using appropriate pain scale  - Administer analgesics based on type and severity of pain and evaluate response  - Implement non-pharmacological measures as appropriate and evaluate response  - Consider cultural and social influences on pain and pain management  - Notify physician/advanced practitioner if interventions unsuccessful or patient reports new pain  Outcome: Progressing     Problem: INFECTION - ADULT  Goal: Absence or prevention of progression during hospitalization  Description: INTERVENTIONS:  - Assess and monitor for signs and symptoms of infection  - Monitor lab/diagnostic results  - Monitor all insertion sites, i.e. indwelling lines, tubes, and drains  - Monitor endotracheal if appropriate and nasal secretions for changes in amount and color  - [place] appropriate cooling/warming therapies per order  - Administer medications as ordered  - Instruct and encourage patient and family to use good hand hygiene technique  - Identify and instruct in appropriate isolation precautions for identified infection/condition  Outcome: Progressing     Problem: SAFETY ADULT  Goal: Patient will remain free of falls  Description: INTERVENTIONS:  - Educate patient/family on patient safety including physical limitations  - Instruct patient to call for assistance with activity   - Consult OT/PT to assist with strengthening/mobility   - Keep Call bell within reach  - Keep bed low and locked with side rails adjusted as appropriate  - Keep care items and personal belongings within reach  - Initiate and maintain comfort rounds  - Make Fall Risk Sign visible to staff  - Apply yellow socks and bracelet for high fall risk patients  - Consider moving patient to room near nurses station  Outcome: Progressing  Goal: Maintain or  return to baseline ADL function  Description: INTERVENTIONS:  -  Assess patient's ability to carry out ADLs; assess patient's baseline for ADL function and identify physical deficits which impact ability to perform ADLs (bathing, care of mouth/teeth, toileting, grooming, dressing, etc.)  - Assess/evaluate cause of self-care deficits   - Assess range of motion  - Assess patient's mobility; develop plan if impaired  - Assess patient's need for assistive devices and provide as appropriate  - Encourage maximum independence but intervene and supervise when necessary  - Involve family in performance of ADLs  - Assess for home care needs following discharge   - Consider OT consult to assist with ADL evaluation and planning for discharge  - Provide patient education as appropriate  Outcome: Progressing     Problem: DISCHARGE PLANNING  Goal: Discharge to home or other facility with appropriate resources  Description: INTERVENTIONS:  - Identify barriers to discharge w/patient and caregiver  - Arrange for needed discharge resources and transportation as appropriate  - Identify discharge learning needs (meds, wound care, etc.)  - Arrange for interpretive services to assist at discharge as needed  - Refer to Case Management Department for coordinating discharge planning if the patient needs post-hospital services based on physician/advanced practitioner order or complex needs related to functional status, cognitive ability, or social support system  Outcome: Progressing     Problem: Knowledge Deficit  Goal: Patient/family/caregiver demonstrates understanding of disease process, treatment plan, medications, and discharge instructions  Description: Complete learning assessment and assess knowledge base.  Interventions:  - Provide teaching at level of understanding  - Provide teaching via preferred learning methods  Outcome: Progressing     Problem: CARDIOVASCULAR - ADULT  Goal: Maintains optimal cardiac output and hemodynamic  stability  Description: INTERVENTIONS:  - Monitor I/O, vital signs and rhythm  - Monitor for S/S and trends of decreased cardiac output  - Administer and titrate ordered vasoactive medications to optimize hemodynamic stability  - Assess quality of pulses, skin color and temperature  - Assess for signs of decreased coronary artery perfusion  - Instruct patient to report change in severity of symptoms  Outcome: Progressing     Problem: RESPIRATORY - ADULT  Goal: Achieves optimal ventilation and oxygenation  Description: INTERVENTIONS:  - Assess for changes in respiratory status  - Assess for changes in mentation and behavior  - Position to facilitate oxygenation and minimize respiratory effort  - Oxygen administered by appropriate delivery if ordered  - Initiate smoking cessation education as indicated  - Encourage broncho-pulmonary hygiene including cough, deep breathe, Incentive Spirometry  - Assess the need for suctioning and aspirate as needed  - Assess and instruct to report SOB or any respiratory difficulty  - Respiratory Therapy support as indicated  Outcome: Progressing     Problem: GASTROINTESTINAL - ADULT  Goal: Minimal or absence of nausea and/or vomiting  Description: INTERVENTIONS:  - Administer IV fluids if ordered to ensure adequate hydration  - Maintain NPO status until nausea and vomiting are resolved  - Nasogastric tube if ordered  - Administer ordered antiemetic medications as needed  - Provide nonpharmacologic comfort measures as appropriate  - Advance diet as tolerated, if ordered  - Consider nutrition services referral to assist patient with adequate nutrition and appropriate food choices  Outcome: Progressing  Goal: Maintains or returns to baseline bowel function  Description: INTERVENTIONS:  - Assess bowel function  - Encourage oral fluids to ensure adequate hydration  - Administer IV fluids if ordered to ensure adequate hydration  - Administer ordered medications as needed  - Encourage  mobilization and activity  - Consider nutritional services referral to assist patient with adequate nutrition and appropriate food choices  Outcome: Progressing  Goal: Maintains adequate nutritional intake  Description: INTERVENTIONS:  - Monitor percentage of each meal consumed  - Identify factors contributing to decreased intake, treat as appropriate  - Assist with meals as needed  - Monitor I&O, weight, and lab values if indicated  - Obtain nutrition services referral as needed  Outcome: Progressing     Problem: GENITOURINARY - ADULT  Goal: Maintains or returns to baseline urinary function  Description: INTERVENTIONS:  - Assess urinary function  - Encourage oral fluids to ensure adequate hydration if ordered  - Administer IV fluids as ordered to ensure adequate hydration  - Administer ordered medications as needed  - Offer frequent toileting  - Follow urinary retention protocol if ordered  Outcome: Progressing  Goal: Absence of urinary retention  Description: INTERVENTIONS:  - Assess patient’s ability to void and empty bladder  - Monitor I/O  - Bladder scan as needed  - Discuss with physician/AP medications to alleviate retention as needed  - Discuss catheterization for long term situations as appropriate  Outcome: Progressing     Problem: METABOLIC, FLUID AND ELECTROLYTES - ADULT  Goal: Electrolytes maintained within normal limits  Description: INTERVENTIONS:  - Monitor labs and assess patient for signs and symptoms of electrolyte imbalances  - Administer electrolyte replacement as ordered  - Monitor response to electrolyte replacements, including repeat lab results as appropriate  - Instruct patient on fluid and nutrition as appropriate  Outcome: Progressing  Goal: Fluid balance maintained  Description: INTERVENTIONS:  - Monitor labs   - Monitor I/O and WT  - Instruct patient on fluid and nutrition as appropriate  - Assess for signs & symptoms of volume excess or deficit  Outcome: Progressing     Problem:  HEMATOLOGIC - ADULT  Goal: Maintains hematologic stability  Description: INTERVENTIONS  - Assess for signs and symptoms of bleeding or hemorrhage  - Monitor labs  - Administer supportive blood products/factors as ordered and appropriate  Outcome: Progressing

## 2024-09-16 ENCOUNTER — DOCUMENTATION (OUTPATIENT)
Dept: HEMATOLOGY ONCOLOGY | Facility: CLINIC | Age: 59
End: 2024-09-16

## 2024-09-16 ENCOUNTER — TELEPHONE (OUTPATIENT)
Dept: UROLOGY | Facility: AMBULATORY SURGERY CENTER | Age: 59
End: 2024-09-16

## 2024-09-16 LAB — BACTERIA UR CULT: ABNORMAL

## 2024-09-16 NOTE — UTILIZATION REVIEW
NOTIFICATION OF ADMISSION DISCHARGE   This is a Notification of Discharge from [facility]. Please be advised that this patient has been discharge from our facility. Below you will find the admission and discharge date and time including the patient’s disposition.   UTILIZATION REVIEW CONTACT:  Nayana Patel  Utilization   Network Utilization Review Department  Phone: [phone] x carefully listen to the prompts. All voicemails are confidential.  Email: [email or web address]     ADMISSION INFORMATION  PRESENTATION DATE: 9/12/2024  5:26 PM  OBERVATION ADMISSION DATE: N/A  INPATIENT ADMISSION DATE: 9/12/24  9:12 PM   DISCHARGE DATE: 9/15/2024 11:41 AM   DISPOSITION:Home/Self Care    Network Utilization Review Department  ATTENTION: Please call with any questions or concerns to [phone] and carefully listen to the prompts so that you are directed to the right person. All voicemails are confidential.   For Discharge needs, contact Care Management DC Support Team at [phone] opt. 2  Send all requests for admission clinical reviews, approved or denied determinations and any other requests to dedicated fax number below belonging to the campus where the patient is receiving treatment. List of dedicated fax numbers for the Facilities:  FACILITY NAME UR FAX NUMBER   ADMISSION DENIALS (Administrative/Medical Necessity) [phone]   DISCHARGE SUPPORT TEAM ([facility]) [phone]   PARENT CHILD HEALTH (Maternity/NICU/Pediatrics) [phone]   [facility] [phone]   [facility] [phone]   [facility] [phone]   [facility] [phone]   [facility] [phone]   [facility] [phone]   [facility] [phone]   [facility] [phone]    [facility] [phone]   [facility] [phone]   [facility] [phone]   [facility] [phone]

## 2024-09-16 NOTE — TELEPHONE ENCOUNTER
----- Message from James Parnell MD sent at 9/13/2024  6:25 PM EDT -----  Maria Luz underwent left stent insertion.  She needs left ureteroscopy in approximately 6 weeks.  Preoperative visit with advanced practitioner for cystoscopy with left ureteroscopy with holmium laser lithotripsy, left retrograde pyelography and left ureteral stent insertion.  Thank you.    FT

## 2024-09-16 NOTE — TELEPHONE ENCOUNTER
Post Op Note    Maria Luz Ponce is a 59 y.o. female s/p Cysto stent performed 9/13/24.  Maria Luz Ponce is a patient of Dr. Dr. Parnell and is seen at the [place] office.     LM for brenda to scheduled follow up with AP to discus next surgery. She is leonora scheduled at the [place] office with klaus 10/4 @ 11/20

## 2024-09-16 NOTE — PROGRESS NOTES
Chart rvw'd on 2024      Referred by:  Dr. Bhavesh Ricardo    Diagnosis:  Cyst of the L ovary    Imagin2024 US pelvis complete w transvaginal-  1.  No convincing acute abnormality. No definitive evidence of acute torsion.  2.  There is a 3.9 cm, unilocular left ovarian cyst, with at least 2 avascular papillary projections. O-RADS 4 = Intermediate risk.  Imaging options include evaluation by ultrsaound specialist, MRI, or per GYN-oncologist protocol. Clinical management by   gynecologist with GYN-oncologist consultation or soley by GYN-oncologist.    2024 CT pelvis wo contrast-  1.  No identifiable acute abnormality to account for the patient's clinical presentation.  2.  There is a 4.6 cm left ovarian/adnexal cyst. Nonemergent follow-up pelvic ultrasound is recommended for further evaluation.    Pathology:  N/A      Surgery:  N/A      Post surgical pathology:  N/A

## 2024-09-17 ENCOUNTER — CLINICAL SUPPORT (OUTPATIENT)
Dept: BARIATRICS | Facility: CLINIC | Age: 59
End: 2024-09-17

## 2024-09-17 VITALS — BODY MASS INDEX: 35.65 KG/M2 | WEIGHT: 214 LBS | HEIGHT: 65 IN

## 2024-09-17 DIAGNOSIS — E66.09 CLASS 2 OBESITY DUE TO EXCESS CALORIES WITH BODY MASS INDEX (BMI) OF 35.0 TO 35.9 IN ADULT: Primary | ICD-10-CM

## 2024-09-17 DIAGNOSIS — E66.01 CLASS 2 SEVERE OBESITY DUE TO EXCESS CALORIES WITH SERIOUS COMORBIDITY AND BODY MASS INDEX (BMI) OF 37.0 TO 37.9 IN ADULT (HCC): ICD-10-CM

## 2024-09-17 PROCEDURE — RECHECK

## 2024-09-17 NOTE — PROGRESS NOTES
Weight Management Medical Nutrition Assessment      Maria Luz presented for 2 week Healthy CORE Program check in.  Today's weight is 214.1. Weight loss of 8#; 14# since HC.  She is doing well and having success with weight loss. At this time she was recently administered to the ER for a kidney stone and also given a stent to allow it to pass. She has been focusing on this challenge in her life right now, which is completely understandable. That being said, she is doing well on weight loss and keeping in mind her fluid intake, protein intake, and finding opportunity to enjoy food when she can.     Maria Luz will return for month 3 REE and Body comp. Please fast 4 hours prior to appointment and no caffeine 4 hours beforehand.     Patient seen by Medical Provider in past 6 months:  yes  Requested to schedule appointment with Medical Provider: Yes      Anthropometric Measurements  Start Weight (#): 231 (with provider); 228# starting HC   Current Weight (#): 214  TBW % Change from start weight:8%  Ideal Body Weight (#):120  Goal Weight (#):140-150#; 200# Under   Highest:Current   Lowest:200     Weight Loss History  Previous weight loss attempts: Commercial Programs (Weight Watchers, boo-box, etc.)  Self Created Diets (Portion Control, Healthy Food Choices, etc.)    Food and Nutrition Related History  Wake up: 515/530AM; 630/7; random    Bed Time:9/10PM     Food Recall  Breakfast:tea, banana or a muffin if I dont eat on the way to work may get eggs (scrambled), sausage, and orange juice;tea with sugar (2 tsp) and dariela      Snack:sometimes Miriam Nascimento cookies (portioned) 100 calorie pack  Lunch:deli sandwich like ham and cheese with whole wheat bread LTMP, salad bar (field greens/paula, tomato, cucus, shredded cheddar, olives, cold pasta like macaroni salad, red beets, eggs, dressing (varies), sunflower seeds; pasta bar, sometimes its PB&J, iced tea (Pure Leaf)   Snack:Pepsi, Cheezeitz (portioned bag) OR potato chips  (snack bag); sweet treat like an ice sandwich  Dinner:Diner, Chipotle, chick-alejandro-a (Yi toast, soup and salad bar, Grilled cheese with fries, gyro, pasta), chicken sandwich or 3 strip meal (sans fries)  Snack:-      Beverages: water, 1% milk, juice, regular soda, and coffee/tea  Volume of beverage intake: 0 water intake; however, fluid amount is appropriate     Weekends: Same  Cravings: Varies   Trouble area of day:Home     Frequency of Eating out: daily  Food restrictions:No   Cooking: self   Food Shopping: self    Physical Activity Intake  Activity:Biking  Frequency:rarely  Physical limitations/barriers to exercise: Hips, knees, back     Estimated Needs  Energy  SECA: BMR:1687      X 1.3 -1000 = 1193  [place] [address] Energy Needs: BMR :1615    1-2# loss weekly sedentary:  938-1438             1-2# loss weekly lightly active:0212-0296  Maintenance calories for sedentary activity level: 1938  Protein:73-92      (1.2-1.5g/kg IBW)  Fluid: 64     (35mL/kg IBW)    Nutrition Diagnosis  Yes;    Excessive energy intake  related to Food and nutrition related knowledge deficit concerning energy intake as evidenced by  BMI >25 for adults       Nutrition Intervention    Nutrition Prescription  Calories:2212-1730  Protein:73-92  Fluid:72    Meal Plan (Rico/Pro/Carb)  Breakfast: 400 rico (25g PRO)  Snack: 150-200 rico (10g PRO)  Lunch: 500 rico (30g PRO)  Snack: 150-200 rico (10g PRO)  Dinner: 500-600 rico (30g PRO)  Snack: -    Nutrition Education:    Healthy Core Manual  Calorie controlled menu  Lean protein food choices  Healthy snack options  Food journaling tips      Nutrition Counseling:  Strategies: meal planning, portion sizes, healthy snack choices, hydration, fiber intake, protein intake, exercise, food journal      Monitoring and Evaluation:  Evaluation criteria:  Energy Intake  Meet protein needs  Maintain adequate hydration  Monitor weekly weight  Meal planning/preparation  Food journal   Decreased portions at  mealtimes and snacks  Physical activity     Barriers to learning:none  Readiness to change: Action  Comprehension: very good  Expected Compliance: very good

## 2024-09-18 LAB
BACTERIA BLD CULT: NORMAL
BACTERIA BLD CULT: NORMAL

## 2024-09-18 RX ORDER — TIRZEPATIDE 5 MG/.5ML
5 INJECTION, SOLUTION SUBCUTANEOUS WEEKLY
Qty: 2 ML | Refills: 0 | Status: SHIPPED | OUTPATIENT
Start: 2024-09-18 | End: 2024-11-13

## 2024-09-19 ENCOUNTER — CLINICAL SUPPORT (OUTPATIENT)
Dept: BARIATRICS | Facility: CLINIC | Age: 59
End: 2024-09-19

## 2024-09-19 VITALS — WEIGHT: 213.2 LBS | BODY MASS INDEX: 35.52 KG/M2 | HEIGHT: 65 IN

## 2024-09-19 DIAGNOSIS — R63.5 ABNORMAL WEIGHT GAIN: Primary | ICD-10-CM

## 2024-09-19 PROCEDURE — RECHECK

## 2024-09-20 ENCOUNTER — CONSULT (OUTPATIENT)
Dept: GYNECOLOGIC ONCOLOGY | Facility: CLINIC | Age: 59
End: 2024-09-20
Payer: COMMERCIAL

## 2024-09-20 VITALS
HEART RATE: 76 BPM | RESPIRATION RATE: 18 BRPM | DIASTOLIC BLOOD PRESSURE: 78 MMHG | WEIGHT: 213 LBS | OXYGEN SATURATION: 95 % | TEMPERATURE: 97.7 F | BODY MASS INDEX: 35.49 KG/M2 | HEIGHT: 65 IN | SYSTOLIC BLOOD PRESSURE: 120 MMHG

## 2024-09-20 DIAGNOSIS — N20.1 LEFT URETERAL CALCULUS: ICD-10-CM

## 2024-09-20 DIAGNOSIS — N83.202 CYST OF LEFT OVARY: Primary | ICD-10-CM

## 2024-09-20 PROCEDURE — 99205 OFFICE O/P NEW HI 60 MIN: CPT | Performed by: OBSTETRICS & GYNECOLOGY

## 2024-09-20 PROCEDURE — 99459 PELVIC EXAMINATION: CPT | Performed by: OBSTETRICS & GYNECOLOGY

## 2024-09-20 RX ORDER — SODIUM CHLORIDE, SODIUM LACTATE, POTASSIUM CHLORIDE, CALCIUM CHLORIDE 600; 310; 30; 20 MG/100ML; MG/100ML; MG/100ML; MG/100ML
125 INJECTION, SOLUTION INTRAVENOUS CONTINUOUS
OUTPATIENT
Start: 2024-09-20

## 2024-09-20 RX ORDER — CLINDAMYCIN PHOSPHATE 900 MG/50ML
900 INJECTION, SOLUTION INTRAVENOUS ONCE
OUTPATIENT
Start: 2024-09-20 | End: 2024-09-20

## 2024-09-20 RX ORDER — ACETAMINOPHEN 325 MG/1
975 TABLET ORAL ONCE
OUTPATIENT
Start: 2024-09-20 | End: 2024-09-20

## 2024-09-20 RX ORDER — HEPARIN SODIUM 5000 [USP'U]/ML
5000 INJECTION, SOLUTION INTRAVENOUS; SUBCUTANEOUS
OUTPATIENT
Start: 2024-09-21 | End: 2024-09-22

## 2024-09-20 NOTE — PATIENT INSTRUCTIONS
1. Nothing to eat or drink after midnight prior to the operation.    2. Please avoid ibuprofen, aspirin, fish oil for 7 days prior to surgery  3. Medications to take the morning of surgery with a sip of water: cymbalta, pepcid  4.  Stop Zepbound 7 days prior to surgery.  5.  You may restart your routine medications after the operation.

## 2024-09-20 NOTE — LETTER
September 20, 2024     Tereza Lynn MD  [address]  [address]  [place] [address]    Patient: Maria Luz Ponce   YOB: 1965   Date of Visit: 9/20/2024       Dear Dr. Lynn:    Thank you for referring Maria Luz Ponce to me for evaluation. Below are my notes for this consultation.    If you have questions, please do not hesitate to call me. I look forward to following your patient along with you.         Sincerely,        Nahun Ricardo MD        CC: MD Nahun Ibarra MD  9/20/2024  4:53 PM  Sign when Signing Visit  Assessment/Plan:    Problem List Items Addressed This Visit          Endocrine    Cyst of left ovary - Primary     59-year-old para 3 with a 3.9 x 3.8 cm left ovarian cyst with papillations, O rad category 4 incidentally identified during workup for left ureteral stone with left hydronephrosis.  She is now status post left ureteral stent placement.  She has been treated with antibiotics for a urinary tract infection.  I reviewed CBC, CMP, CT pelvis images from her recent hospital admission.  I concur with the radiologist interpretation.  I also reviewed the pelvic ultrasound.  Her performance status is 0.  1.  I discussed the differential diagnosis of O rad category for ovarian cysts.  She understands that overall, the risk of malignancy remains low.  The differential diagnosis includes benign, borderline, malignant etiologies.  2.  I discussed surgical intervention including bilateral salpingo-oophorectomy with or without hysterectomy.  She would prefer simultaneous hysterectomy.  3.  I discussed the risks of examination under anesthesia, total laparoscopic hysterectomy, bilateral salpingo-oophorectomy with intraoperative frozen section analysis of the left ovarian cyst, possible staging including lymphadenectomy, peritoneal biopsies, omentectomy, possible exploratory laparotomy and all other indicated procedures, possible cystoscopy and removal  of ureteral stent if indicated per urology.  She understands the risks of the operation and agrees to proceed as outlined.  Consent for surgery was obtained by me in the office.  I have reached out to Dr. Parnell for operative planning.  4.  She understands that if malignancy is identified intraoperatively that adjuvant therapy is prescribed based on surgical stage and that adjuvant treatment options may include observation versus chemotherapy.  5.  We discussed perioperative medication management and activity limitations.  Thank you for courtesy of this consultation.  All questions were answered by the end of the visit.         Relevant Orders    Case request operating room: HYSTERECTOMY LAPAROSCOPIC TOTAL (LTH) (Completed)    Type and screen    HEMOGLOBIN A1C W/ EAG ESTIMATION    EKG 12 lead    XR chest pa and lateral       Genitourinary    Left ureteral calculus           CHIEF COMPLAINT: Complex left ovarian cyst      Patient ID: Maria Luz Ponce is a 59 y.o. female  Who presents as a consultation from Dr. Lynn to discuss treatment options for a complex left ovarian cyst.  She presented with left flank pain and had a CT of the spine which revealed the 4 mm left ureteral calculus with mild hydronephrosis.  This was followed up with a CT of the pelvis which revealed the approximate 4 cm left ovarian cyst.  I reviewed the images.  There was no evidence of ascites, lymphadenopathy, or peritoneal disease in the pelvis.  Pelvic ultrasound 9/12/2024 revealed the uterus to measure 5.2 x 3.8 cm with a 3 mm endometrial thickness.  The right ovary was not seen.  There was a left ovarian cyst measuring 3.9 x 3.8 cm with 2 mural papillations.  She then had a cystoscopy, left ureteral stent placement on 9/13/2024.  She had a urine culture that grew pansensitive E. coli.  Labs from 9/12/2024 revealed a total white blood cell count of 12.2, hemoglobin 13.9 g/dL and normal platelet count.  CMP was normal on 9/15/2024 with a  serum creatinine of 0.78 mg/dL.  She has discomfort from the left ureteral stents.  She has occasional hematuria.  No vaginal bleeding.  She has not had any history of abnormal Pap smears.  She is able to perform her actives of daily living without difficulty.        Review of Systems   Constitutional:  Negative for activity change and unexpected weight change.   HENT: Negative.     Eyes: Negative.    Respiratory: Negative.     Cardiovascular: Negative.    Gastrointestinal:  Negative for abdominal distention and abdominal pain.   Endocrine: Negative.    Genitourinary:  Positive for hematuria. Negative for pelvic pain and vaginal bleeding.   Musculoskeletal:  Positive for back pain.   Skin: Negative.    Allergic/Immunologic: Negative.    Neurological: Negative.    Hematological: Negative.    Psychiatric/Behavioral: Negative.         Current Outpatient Medications   Medication Sig Dispense Refill   • albuterol (PROVENTIL HFA,VENTOLIN HFA) 90 mcg/act inhaler Inhale 2 puffs if needed     • ALPRAZolam (XANAX) 0.5 mg tablet Take 1 tablet (0.5 mg total) by mouth daily at bedtime as needed for anxiety for up to 10 days 5 tablet 0   • Ascorbic Acid (VITAMIN C) 100 MG tablet Take by mouth daily     • cefdinir (OMNICEF) 300 mg capsule Take 1 capsule (300 mg total) by mouth every 12 (twelve) hours for 7 days 14 capsule 0   • Cholecalciferol (VITAMIN D3) 3000 units TABS Take 3,000 Units by mouth daily     • Diclofenac Sodium (VOLTAREN) 1 % if needed     • dicyclomine (BENTYL) 10 mg capsule TAKE 1 CAPSULE (10 MG TOTAL) BY MOUTH 3 (THREE) TIMES A DAY AS NEEDED (DIARRHEA AND ABDOMINAL PAIN) 300 capsule 1   • DULoxetine (CYMBALTA) 60 mg delayed release capsule      • famotidine (PEPCID) 10 mg tablet Take 10 mg by mouth 2 (two) times a day     • fexofenadine (ALLEGRA) 180 MG tablet Take by mouth as needed     • fluticasone (FLONASE) 50 mcg/act nasal spray SPRAY 2 SPRAYS INTO EACH NOSTRIL 2 TIMES A DAY (Patient taking differently: if  needed) 96 mL 2   • gabapentin (NEURONTIN) 100 mg capsule Take 200 mg by mouth daily at bedtime     • ipratropium (ATROVENT) 0.06 % nasal spray SPRAY 2 SPRAYS INTO EACH NOSTRIL 3 TIMES A DAY. 45 mL 3   • melatonin 3 mg Take 3 mg by mouth daily at bedtime     • meloxicam (MOBIC) 15 mg tablet Take 15 mg by mouth daily     • methocarbamol (ROBAXIN) 750 mg tablet Take 750 mg by mouth if needed     • Omega-3 1000 MG CAPS Take by mouth daily     • ondansetron (ZOFRAN) 4 mg tablet Take 1 tablet (4 mg total) by mouth every 8 (eight) hours as needed for nausea or vomiting 20 tablet 0   • tirzepatide (Zepbound) 5 mg/0.5 mL auto-injector Inject 0.5 mL (5 mg total) under the skin once a week 2 mL 0   • valsartan (DIOVAN) 160 mg tablet Take 160 mg by mouth daily     • fluticasone-salmeterol (ADVAIR, WIXELA) 100-50 mcg/dose inhaler Inhale 1 puff 2 (two) times a day     • sodium chloride (OCEAN) 0.65 % nasal spray 2 sprays into each nostril every 2 (two) hours while awake 30 mL 2     No current facility-administered medications for this visit.       Allergies   Allergen Reactions   • Amoxicillin-Pot Clavulanate Hives   • Penicillins        Past Medical History:   Diagnosis Date   • Ankle fracture    • Asthma    • Cervical spinal stenosis    • Closed left arm fracture, sequela    • Concussion    • Degeneration, intervertebral disc, cervical    • Hypertension    • Kidney stone    • Migraine    • Mixed hyperlipidemia 1/5/2021   • Right arm fracture    • Seasonal allergies    • Shingles    • Vitamin D deficiency        Past Surgical History:   Procedure Laterality Date   • CARPAL TUNNEL RELEASE Bilateral    • CATARACT EXTRACTION Left    • FL RETROGRADE PYELOGRAM  9/13/2024   • MOUTH SURGERY     • VA COLONOSCOPY FLX DX W/COLLJ SPEC WHEN PFRMD N/A 12/27/2018    Procedure: COLONOSCOPY;  Surgeon: Bita Montalvo MD;  Location: [facility];  Service: Gastroenterology   • VA CYSTO/URETERO W/LITHOTRIPSY &INDWELL STENT INSRT Left 9/13/2024     "Procedure: CYSTOSCOPY URETEROSCOPY WITH RETROGRADE PYELOGRAM AND INSERTION STENT URETERAL;  Surgeon: Eder Johns MD;  Location: [facility];  Service: Urology   • SINUS SURGERY     • TUBAL LIGATION         OB History          3    Para   3    Term   3            AB        Living             SAB        IAB        Ectopic        Multiple        Live Births   3                 Family History   Problem Relation Age of Onset   • Heart disease Father    • No Known Problems Daughter    • No Known Problems Daughter    • No Known Problems Daughter    • No Known Problems Maternal Grandmother    • No Known Problems Maternal Grandfather    • No Known Problems Paternal Grandmother    • No Known Problems Paternal Grandfather    • Uterine cancer Maternal Aunt 75   • Lung cancer Paternal Aunt         age unknown       The following portions of the patient's history were reviewed and updated as appropriate: allergies, current medications, past family history, past medical history, past social history, past surgical history, and problem list.      Objective:    Blood pressure 120/78, pulse 76, temperature 97.7 °F (36.5 °C), resp. rate 18, height 5' 5\" (1.651 m), weight 96.6 kg (213 lb), last menstrual period 03/15/2019, SpO2 95%.  Body mass index is 35.45 kg/m².    Physical Exam  Vitals reviewed. Exam conducted with a chaperone present.   Constitutional:       General: She is not in acute distress.     Appearance: Normal appearance. She is well-developed. She is not ill-appearing, toxic-appearing or diaphoretic.   HENT:      Head: Normocephalic and atraumatic.   Eyes:      General: No scleral icterus.     Extraocular Movements: Extraocular movements intact.      Conjunctiva/sclera: Conjunctivae normal.   Neck:      Thyroid: No thyromegaly.   Cardiovascular:      Rate and Rhythm: Normal rate and regular rhythm.      Heart sounds: Normal heart sounds.   Pulmonary:      Effort: Pulmonary effort is normal.      Breath " "sounds: Normal breath sounds.   Abdominal:      General: There is no distension.      Palpations: Abdomen is soft. There is no mass.      Tenderness: There is no abdominal tenderness. There is no guarding or rebound.      Hernia: No hernia is present.   Genitourinary:     Comments: The external female genitalia is normal. The bartholin's, uretheral and skenes glands are normal. The urethral meatus is normal (midline with no lesions). Anus without fissure or lesion. Speculum exam reveals vagina without lesion or discharge. Cervix is normal appearing without visible lesions. No bleeding. No significant cystocele or rectocele noted. Bimanual exam notes a uterus with normal contour, mobility. No tenderness. Adnexa without masses or tenderness. Bladder is without fullness, mass or tenderness.    Musculoskeletal:         General: No swelling or tenderness.      Cervical back: Normal range of motion and neck supple.      Right lower leg: No edema.      Left lower leg: No edema.   Lymphadenopathy:      Cervical: No cervical adenopathy.   Skin:     General: Skin is warm and dry.      Coloration: Skin is not jaundiced or pale.      Findings: No lesion or rash.   Neurological:      General: No focal deficit present.      Mental Status: She is alert and oriented to person, place, and time. Mental status is at baseline.      Cranial Nerves: No cranial nerve deficit.      Motor: No weakness.      Gait: Gait normal.   Psychiatric:         Mood and Affect: Mood normal.         Behavior: Behavior normal.         Thought Content: Thought content normal.         Judgment: Judgment normal.           No results found for: \"\"  Lab Results   Component Value Date    WBC 12.21 (H) 09/12/2024    HGB 13.9 09/12/2024    HCT 41.1 09/12/2024    MCV 92 09/12/2024     09/13/2024     Lab Results   Component Value Date    K 3.6 09/15/2024     (H) 09/15/2024    CO2 25 09/15/2024    BUN 9 09/15/2024    CREATININE 0.78 09/15/2024    " GLUCOSE 115 03/17/2021    GLUF 99 07/09/2024    CALCIUM 8.4 09/15/2024    AST 15 09/15/2024    ALT 17 09/15/2024    ALKPHOS 25 (L) 09/15/2024    EGFR 83 09/15/2024

## 2024-09-20 NOTE — PROGRESS NOTES
Assessment/Plan:    Problem List Items Addressed This Visit          Endocrine    Cyst of left ovary - Primary     59-year-old para 3 with a 3.9 x 3.8 cm left ovarian cyst with papillations, O rad category 4 incidentally identified during workup for left ureteral stone with left hydronephrosis.  She is now status post left ureteral stent placement.  She has been treated with antibiotics for a urinary tract infection.  I reviewed CBC, CMP, CT pelvis images from her recent hospital admission.  I concur with the radiologist interpretation.  I also reviewed the pelvic ultrasound.  Her performance status is 0.  1.  I discussed the differential diagnosis of O rad category for ovarian cysts.  She understands that overall, the risk of malignancy remains low.  The differential diagnosis includes benign, borderline, malignant etiologies.  2.  I discussed surgical intervention including bilateral salpingo-oophorectomy with or without hysterectomy.  She would prefer simultaneous hysterectomy.  3.  I discussed the risks of examination under anesthesia, total laparoscopic hysterectomy, bilateral salpingo-oophorectomy with intraoperative frozen section analysis of the left ovarian cyst, possible staging including lymphadenectomy, peritoneal biopsies, omentectomy, possible exploratory laparotomy and all other indicated procedures, possible cystoscopy and removal of ureteral stent if indicated per urology.  She understands the risks of the operation and agrees to proceed as outlined.  Consent for surgery was obtained by me in the office.  I have reached out to Dr. Parnell for operative planning.  4.  She understands that if malignancy is identified intraoperatively that adjuvant therapy is prescribed based on surgical stage and that adjuvant treatment options may include observation versus chemotherapy.  5.  We discussed perioperative medication management and activity limitations.  Thank you for courtesy of this consultation.  All  questions were answered by the end of the visit.         Relevant Orders    Case request operating room: HYSTERECTOMY LAPAROSCOPIC TOTAL (LTH) (Completed)    Type and screen    HEMOGLOBIN A1C W/ EAG ESTIMATION    EKG 12 lead    XR chest pa and lateral       Genitourinary    Left ureteral calculus           CHIEF COMPLAINT: Complex left ovarian cyst      Patient ID: Maria Luz Ponce is a 59 y.o. female  Who presents as a consultation from Dr. Lynn to discuss treatment options for a complex left ovarian cyst.  She presented with left flank pain and had a CT of the spine which revealed the 4 mm left ureteral calculus with mild hydronephrosis.  This was followed up with a CT of the pelvis which revealed the approximate 4 cm left ovarian cyst.  I reviewed the images.  There was no evidence of ascites, lymphadenopathy, or peritoneal disease in the pelvis.  Pelvic ultrasound 9/12/2024 revealed the uterus to measure 5.2 x 3.8 cm with a 3 mm endometrial thickness.  The right ovary was not seen.  There was a left ovarian cyst measuring 3.9 x 3.8 cm with 2 mural papillations.  She then had a cystoscopy, left ureteral stent placement on 9/13/2024.  She had a urine culture that grew pansensitive E. coli.  Labs from 9/12/2024 revealed a total white blood cell count of 12.2, hemoglobin 13.9 g/dL and normal platelet count.  CMP was normal on 9/15/2024 with a serum creatinine of 0.78 mg/dL.  She has discomfort from the left ureteral stents.  She has occasional hematuria.  No vaginal bleeding.  She has not had any history of abnormal Pap smears.  She is able to perform her actives of daily living without difficulty.        Review of Systems   Constitutional:  Negative for activity change and unexpected weight change.   HENT: Negative.     Eyes: Negative.    Respiratory: Negative.     Cardiovascular: Negative.    Gastrointestinal:  Negative for abdominal distention and abdominal pain.   Endocrine: Negative.    Genitourinary:  Positive  for hematuria. Negative for pelvic pain and vaginal bleeding.   Musculoskeletal:  Positive for back pain.   Skin: Negative.    Allergic/Immunologic: Negative.    Neurological: Negative.    Hematological: Negative.    Psychiatric/Behavioral: Negative.         Current Outpatient Medications   Medication Sig Dispense Refill    albuterol (PROVENTIL HFA,VENTOLIN HFA) 90 mcg/act inhaler Inhale 2 puffs if needed      ALPRAZolam (XANAX) 0.5 mg tablet Take 1 tablet (0.5 mg total) by mouth daily at bedtime as needed for anxiety for up to 10 days 5 tablet 0    Ascorbic Acid (VITAMIN C) 100 MG tablet Take by mouth daily      cefdinir (OMNICEF) 300 mg capsule Take 1 capsule (300 mg total) by mouth every 12 (twelve) hours for 7 days 14 capsule 0    Cholecalciferol (VITAMIN D3) 3000 units TABS Take 3,000 Units by mouth daily      Diclofenac Sodium (VOLTAREN) 1 % if needed      dicyclomine (BENTYL) 10 mg capsule TAKE 1 CAPSULE (10 MG TOTAL) BY MOUTH 3 (THREE) TIMES A DAY AS NEEDED (DIARRHEA AND ABDOMINAL PAIN) 300 capsule 1    DULoxetine (CYMBALTA) 60 mg delayed release capsule       famotidine (PEPCID) 10 mg tablet Take 10 mg by mouth 2 (two) times a day      fexofenadine (ALLEGRA) 180 MG tablet Take by mouth as needed      fluticasone (FLONASE) 50 mcg/act nasal spray SPRAY 2 SPRAYS INTO EACH NOSTRIL 2 TIMES A DAY (Patient taking differently: if needed) 96 mL 2    gabapentin (NEURONTIN) 100 mg capsule Take 200 mg by mouth daily at bedtime      ipratropium (ATROVENT) 0.06 % nasal spray SPRAY 2 SPRAYS INTO EACH NOSTRIL 3 TIMES A DAY. 45 mL 3    melatonin 3 mg Take 3 mg by mouth daily at bedtime      meloxicam (MOBIC) 15 mg tablet Take 15 mg by mouth daily      methocarbamol (ROBAXIN) 750 mg tablet Take 750 mg by mouth if needed      Omega-3 1000 MG CAPS Take by mouth daily      ondansetron (ZOFRAN) 4 mg tablet Take 1 tablet (4 mg total) by mouth every 8 (eight) hours as needed for nausea or vomiting 20 tablet 0    tirzepatide  (Zepbound) 5 mg/0.5 mL auto-injector Inject 0.5 mL (5 mg total) under the skin once a week 2 mL 0    valsartan (DIOVAN) 160 mg tablet Take 160 mg by mouth daily      fluticasone-salmeterol (ADVAIR, WIXELA) 100-50 mcg/dose inhaler Inhale 1 puff 2 (two) times a day      sodium chloride (OCEAN) 0.65 % nasal spray 2 sprays into each nostril every 2 (two) hours while awake 30 mL 2     No current facility-administered medications for this visit.       Allergies   Allergen Reactions    Amoxicillin-Pot Clavulanate Hives    Penicillins        Past Medical History:   Diagnosis Date    Ankle fracture     Asthma     Cervical spinal stenosis     Closed left arm fracture, sequela     Concussion     Degeneration, intervertebral disc, cervical     Hypertension     Kidney stone     Migraine     Mixed hyperlipidemia 2021    Right arm fracture     Seasonal allergies     Shingles     Vitamin D deficiency        Past Surgical History:   Procedure Laterality Date    CARPAL TUNNEL RELEASE Bilateral     CATARACT EXTRACTION Left     FL RETROGRADE PYELOGRAM  2024    MOUTH SURGERY      AK COLONOSCOPY FLX DX W/COLLJ SPEC WHEN PFRMD N/A 2018    Procedure: COLONOSCOPY;  Surgeon: Bita Montalvo MD;  Location: [facility];  Service: Gastroenterology    AK CYSTO/URETERO W/LITHOTRIPSY &INDWELL STENT INSRT Left 2024    Procedure: CYSTOSCOPY URETEROSCOPY WITH RETROGRADE PYELOGRAM AND INSERTION STENT URETERAL;  Surgeon: Eder Johns MD;  Location: [facility];  Service: Urology    SINUS SURGERY      TUBAL LIGATION         OB History          3    Para   3    Term   3            AB        Living             SAB        IAB        Ectopic        Multiple        Live Births   3                 Family History   Problem Relation Age of Onset    Heart disease Father     No Known Problems Daughter     No Known Problems Daughter     No Known Problems Daughter     No Known Problems Maternal Grandmother     No Known Problems  "Maternal Grandfather     No Known Problems Paternal Grandmother     No Known Problems Paternal Grandfather     Uterine cancer Maternal Aunt 75    Lung cancer Paternal Aunt         age unknown       The following portions of the patient's history were reviewed and updated as appropriate: allergies, current medications, past family history, past medical history, past social history, past surgical history, and problem list.      Objective:    Blood pressure 120/78, pulse 76, temperature 97.7 °F (36.5 °C), resp. rate 18, height 5' 5\" (1.651 m), weight 96.6 kg (213 lb), last menstrual period 03/15/2019, SpO2 95%.  Body mass index is 35.45 kg/m².    Physical Exam  Vitals reviewed. Exam conducted with a chaperone present.   Constitutional:       General: She is not in acute distress.     Appearance: Normal appearance. She is well-developed. She is not ill-appearing, toxic-appearing or diaphoretic.   HENT:      Head: Normocephalic and atraumatic.   Eyes:      General: No scleral icterus.     Extraocular Movements: Extraocular movements intact.      Conjunctiva/sclera: Conjunctivae normal.   Neck:      Thyroid: No thyromegaly.   Cardiovascular:      Rate and Rhythm: Normal rate and regular rhythm.      Heart sounds: Normal heart sounds.   Pulmonary:      Effort: Pulmonary effort is normal.      Breath sounds: Normal breath sounds.   Abdominal:      General: There is no distension.      Palpations: Abdomen is soft. There is no mass.      Tenderness: There is no abdominal tenderness. There is no guarding or rebound.      Hernia: No hernia is present.   Genitourinary:     Comments: The external female genitalia is normal. The bartholin's, uretheral and skenes glands are normal. The urethral meatus is normal (midline with no lesions). Anus without fissure or lesion. Speculum exam reveals vagina without lesion or discharge. Cervix is normal appearing without visible lesions. No bleeding. No significant cystocele or rectocele " "noted. Bimanual exam notes a uterus with normal contour, mobility. No tenderness. Adnexa without masses or tenderness. Bladder is without fullness, mass or tenderness.    Musculoskeletal:         General: No swelling or tenderness.      Cervical back: Normal range of motion and neck supple.      Right lower leg: No edema.      Left lower leg: No edema.   Lymphadenopathy:      Cervical: No cervical adenopathy.   Skin:     General: Skin is warm and dry.      Coloration: Skin is not jaundiced or pale.      Findings: No lesion or rash.   Neurological:      General: No focal deficit present.      Mental Status: She is alert and oriented to person, place, and time. Mental status is at baseline.      Cranial Nerves: No cranial nerve deficit.      Motor: No weakness.      Gait: Gait normal.   Psychiatric:         Mood and Affect: Mood normal.         Behavior: Behavior normal.         Thought Content: Thought content normal.         Judgment: Judgment normal.           No results found for: \"\"  Lab Results   Component Value Date    WBC 12.21 (H) 09/12/2024    HGB 13.9 09/12/2024    HCT 41.1 09/12/2024    MCV 92 09/12/2024     09/13/2024     Lab Results   Component Value Date    K 3.6 09/15/2024     (H) 09/15/2024    CO2 25 09/15/2024    BUN 9 09/15/2024    CREATININE 0.78 09/15/2024    GLUCOSE 115 03/17/2021    GLUF 99 07/09/2024    CALCIUM 8.4 09/15/2024    AST 15 09/15/2024    ALT 17 09/15/2024    ALKPHOS 25 (L) 09/15/2024    EGFR 83 09/15/2024       "

## 2024-09-24 ENCOUNTER — CLINICAL SUPPORT (OUTPATIENT)
Dept: BARIATRICS | Facility: CLINIC | Age: 59
End: 2024-09-24

## 2024-09-24 VITALS
HEIGHT: 65 IN | TEMPERATURE: 97.5 F | RESPIRATION RATE: 18 BRPM | SYSTOLIC BLOOD PRESSURE: 122 MMHG | DIASTOLIC BLOOD PRESSURE: 66 MMHG | HEART RATE: 68 BPM | WEIGHT: 210.2 LBS | BODY MASS INDEX: 35.02 KG/M2 | OXYGEN SATURATION: 96 %

## 2024-09-24 DIAGNOSIS — R63.5 ABNORMAL WEIGHT GAIN: Primary | ICD-10-CM

## 2024-09-24 PROCEDURE — RECHECK

## 2024-09-24 NOTE — PROGRESS NOTES
Patient last visit weight:231.0 lb   Patient current visit weight: 210.2 lb     If you are taking phentermine or other oral weight loss medications, are you experiencing any of the following symptoms:  Headache:   Blurred Vision:   Chest Pain:   Palpitations:  Insomnia:   SPECIFY ORAL MEDICATION AND DOSAGE:     If you are taking an injectable medication,  are you experiencing any of the following symptoms:  Bloating: NO  Nausea:NO  Vomiting: NO  Constipation: NO  Diarrhea:NO  SPECIFY INJECTABLE MEDICATION AND CURRENT DOSAGE: Zepbound 5mg ( pt is tolerating well , and had no further questions for the provider.)       Vitals:    Is BP less than 100/60? NO  Is BP greater than 140/90? NO  Is HR greater than 100? NO  **If yes to any of the above, have patient relax and repeat in 5-10 minutes**    Repeat values:    Is BP less than 100/60?  Is BP greater than 140/90?  Is HR greater than 100?  **If values remain outside of ranges above, please consult provider for next steps**

## 2024-09-27 ENCOUNTER — TELEPHONE (OUTPATIENT)
Dept: GYNECOLOGIC ONCOLOGY | Facility: CLINIC | Age: 59
End: 2024-09-27

## 2024-09-27 NOTE — TELEPHONE ENCOUNTER
Phoned patient to discuss surgery scheduling, surgery scheduled for 11/13/2024 at [facility] .  Pre Op and Post op instructions reviewed.  Post op appointment scheduled.   All questions/concerns addressed.  Provided patient with call back number for any questions/concerns.  
normal...

## 2024-10-03 NOTE — PROGRESS NOTES
10/4/2024      Chief Complaint   Patient presents with    Follow-up     Assessment and Plan    59 y.o. female managed by Dr. Parnell    1. Ureteral calculi  Assessment & Plan:  Presents presents for second stage surgery due to infected stone  Surgery 9/13 for left ureteral calculus-stent placed only  Operative findings-Left proximal ureteral calculus, status post left ureteral stent insertion without a string. Cloudy appearing and for culture.   Complications- none noted   Presents today to discuss definitive stone treatment  Patient reports stent as being tolerable  No issues with urination  Consent reviewed and signed for cystoscopy, ureteroscopy, lithotripsy holmium laser, basket stone extraction, retrograde pyelogram, potential for stent insertion  Or case request sent  Patient aware that surgical scheduler will be in contact for potential surgery date    As of note patient does have surgery scheduled on 11/13 for total laparoscopic hysterectomy and bilateral salpingo-oophorectomy for left ovarian cyst categorizes over a category 4 which was incidentally found during workup for left ureteral stone    Orders:  -     Ambulatory referral to Urology    History of Present Illness  Maria Luz Ponce is a 59 y.o. female here for evaluation of second stage surgery for infected stone.  Patient had surgery on 9/13 where a left ureteral stent was placed.  Patient reports stent is being tolerable.  Patient reports that she is ready for second day of surgery.  She additionally brought [facility] paperwork to be filled out.  Paperwork provided to surgical scheduler will be faxed to number listed.    Incidental finding of left ovarian cyst  Patient does have scheduled 11/13 total laparoscopic hysterectomy bilateral salpingo oophorectomy for a left ovarian cyst. 3.9 x 3.8 cm left ovarian cyst with papillations, O rad category 4 incidentally identified during workup for left ureteral stone with left hydronephrosis.  If malignancy is  "identified intraoperatively patient will have adjuvant therapy based on surgical staging.     Review of Systems   Constitutional:  Negative for chills and fever.   HENT:  Negative for ear pain and sore throat.    Eyes:  Negative for pain and visual disturbance.   Respiratory:  Negative for cough and shortness of breath.    Cardiovascular:  Negative for chest pain and palpitations.   Gastrointestinal:  Negative for abdominal pain and vomiting.   Genitourinary:  Positive for frequency. Negative for decreased urine volume, difficulty urinating, dysuria, flank pain, hematuria and urgency.   Musculoskeletal:  Negative for arthralgias and back pain.   Skin:  Negative for color change and rash.   Neurological:  Negative for seizures and syncope.   All other systems reviewed and are negative.      Vitals  Vitals:    10/04/24 1120   BP: 122/82   BP Location: Left arm   Patient Position: Sitting   Cuff Size: Adult   Pulse: 74   SpO2: 95%   Weight: 93.5 kg (206 lb 3.2 oz)   Height: 5' 5\" (1.651 m)       Physical Exam  Constitutional:       Appearance: Normal appearance.   HENT:      Head: Normocephalic and atraumatic.   Pulmonary:      Effort: Pulmonary effort is normal.   Musculoskeletal:         General: Normal range of motion.      Cervical back: Normal range of motion.   Neurological:      General: No focal deficit present.      Mental Status: She is alert and oriented to person, place, and time. Mental status is at baseline.   Psychiatric:         Mood and Affect: Mood normal.         Behavior: Behavior normal.         Thought Content: Thought content normal.       Past History  Past Medical History:   Diagnosis Date    Ankle fracture     Asthma     Cervical spinal stenosis     Closed left arm fracture, sequela     Concussion     Degeneration, intervertebral disc, cervical     Hypertension     Kidney stone     Migraine     Mixed hyperlipidemia 1/5/2021    Right arm fracture     Seasonal allergies     Shingles     Vitamin " D deficiency      Social History     Socioeconomic History    Marital status: /Civil Union     Spouse name: None    Number of children: None    Years of education: None    Highest education level: None   Occupational History    None   Tobacco Use    Smoking status: Never    Smokeless tobacco: Never   Vaping Use    Vaping status: Never Used   Substance and Sexual Activity    Alcohol use: Not Currently     Comment: social    Drug use: No    Sexual activity: Yes     Partners: Male     Birth control/protection: Female Sterilization   Other Topics Concern    None   Social History Narrative    None     Social Determinants of Health     Financial Resource Strain: Low Risk  (2/20/2024)    [facility]    Overall Financial Resource Strain (CARDIA)     Difficulty of Paying Living Expenses: Not hard at all   Food Insecurity: No Food Insecurity (9/13/2024)    Hunger Vital Sign     Worried About Running Out of Food in the Last Year: Never true     Ran Out of Food in the Last Year: Never true   Transportation Needs: No Transportation Needs (9/13/2024)    PRAPARE - Transportation     Lack of Transportation (Medical): No     Lack of Transportation (Non-Medical): No   Physical Activity: Not on file   Stress: No Stress Concern Present (2/20/2024)    [facility]    [employer] - Occupational Stress Questionnaire     Feeling of Stress : Only a little   Social Connections: Unknown (6/18/2024)    Received from [place]    Social [place]     How often do you feel lonely or isolated from those around you? (Adult - for ages 18 years and over): Not on file   Intimate Partner Violence: Not At Risk (2/20/2024)    [facility]    Humiliation, Afraid, Rape, and Kick questionnaire     Fear of Current or Ex-Partner: No      "Emotionally Abused: No     Physically Abused: No     Sexually Abused: No   Housing Stability: Low Risk  (9/13/2024)    Housing Stability Vital Sign     Unable to Pay for Housing in the Last Year: No     Number of Times Moved in the Last Year: 0     Homeless in the Last Year: No     Social History     Tobacco Use   Smoking Status Never   Smokeless Tobacco Never     Family History   Problem Relation Age of Onset    Heart disease Father     No Known Problems Daughter     No Known Problems Daughter     No Known Problems Daughter     No Known Problems Maternal Grandmother     No Known Problems Maternal Grandfather     No Known Problems Paternal Grandmother     No Known Problems Paternal Grandfather     Uterine cancer Maternal Aunt 75    Lung cancer Paternal Aunt         age unknown       The following portions of the patient's history were reviewed and updated as appropriate: allergies, current medications, past medical history, past social history, past surgical history and problem list.    Results  No results found for this or any previous visit (from the past 1 hour(s)).]  No results found for: \"PSA\"  Lab Results   Component Value Date    GLUCOSE 115 03/17/2021    CALCIUM 8.4 09/15/2024    K 3.6 09/15/2024    CO2 25 09/15/2024     (H) 09/15/2024    BUN 9 09/15/2024    CREATININE 0.78 09/15/2024     Lab Results   Component Value Date    WBC 12.21 (H) 09/12/2024    HGB 13.9 09/12/2024    HCT 41.1 09/12/2024    MCV 92 09/12/2024     09/13/2024       "

## 2024-10-04 ENCOUNTER — OFFICE VISIT (OUTPATIENT)
Dept: UROLOGY | Facility: MEDICAL CENTER | Age: 59
End: 2024-10-04
Payer: COMMERCIAL

## 2024-10-04 VITALS
HEART RATE: 74 BPM | WEIGHT: 206.2 LBS | OXYGEN SATURATION: 95 % | SYSTOLIC BLOOD PRESSURE: 122 MMHG | DIASTOLIC BLOOD PRESSURE: 82 MMHG | BODY MASS INDEX: 34.35 KG/M2 | HEIGHT: 65 IN

## 2024-10-04 DIAGNOSIS — N20.1 URETERAL CALCULI: Primary | ICD-10-CM

## 2024-10-04 PROBLEM — N20.9 UROLITHIASIS: Status: ACTIVE | Noted: 2024-09-13

## 2024-10-04 PROCEDURE — 99213 OFFICE O/P EST LOW 20 MIN: CPT

## 2024-10-04 NOTE — ASSESSMENT & PLAN NOTE
Presents presents for second stage surgery due to infected stone  Surgery 9/13 for left ureteral calculus-stent placed only  Operative findings-Left proximal ureteral calculus, status post left ureteral stent insertion without a string. Cloudy appearing and for culture.   Complications- none noted   Presents today to discuss definitive stone treatment  Patient reports stent as being tolerable  No issues with urination  Consent reviewed and signed for cystoscopy, ureteroscopy, lithotripsy holmium laser, basket stone extraction, retrograde pyelogram, potential for stent insertion  Or case request sent  Patient aware that surgical scheduler will be in contact for potential surgery date    As of note patient does have surgery scheduled on 11/13 for total laparoscopic hysterectomy and bilateral salpingo-oophorectomy for left ovarian cyst O rad category 4 which was incidentally found during workup for left ureteral stone.

## 2024-10-08 ENCOUNTER — PREP FOR PROCEDURE (OUTPATIENT)
Dept: UROLOGY | Facility: AMBULATORY SURGERY CENTER | Age: 59
End: 2024-10-08

## 2024-10-08 ENCOUNTER — TELEPHONE (OUTPATIENT)
Dept: UROLOGY | Facility: AMBULATORY SURGERY CENTER | Age: 59
End: 2024-10-08

## 2024-10-08 DIAGNOSIS — R39.89 SUSPECTED UTI: ICD-10-CM

## 2024-10-08 DIAGNOSIS — N20.1 URETERAL CALCULI: Primary | ICD-10-CM

## 2024-10-08 NOTE — TELEPHONE ENCOUNTER
"Received [place] paperwork for patient requesting coverage from 9/12-9/22. Patient was only hospitalized from 9/12-9/15. We placed a stent on 9/13 for patient. Spoke with PRAVEEN Quinonez about [place] paperwork and we can only cover her admitted days. Called and spoke with patient. Asked why she returned to work on 9/23 and not sooner. She states the \"PA told me to take a week off\". I asked for what reason. Patient states \"I was on an antibiotic for my UTI\". Informed that she could have worked while taking the antibiotic. Asked which PA told her this. She states it was a [employer] PA. Informed that [facility] also saw her and maybe they can cover her time but from a legal standpoint, we can only cover 9/12-9/15. She became upset and stated that [facility] did nothing for her, she only saw them the other day. Informed that she should reach out to PCP if she cannot contact [employer] about extending the FMLA and that we can gladly fill it out for 9/12-9/15 and will hold onto it until we hear back from her. Patient stated \"okay, whatever\" and disconnected the call.   "

## 2024-10-10 ENCOUNTER — CLINICAL SUPPORT (OUTPATIENT)
Dept: BARIATRICS | Facility: CLINIC | Age: 59
End: 2024-10-10

## 2024-10-10 VITALS — HEIGHT: 65 IN | BODY MASS INDEX: 34.72 KG/M2 | WEIGHT: 208.4 LBS

## 2024-10-10 DIAGNOSIS — R63.5 ABNORMAL WEIGHT GAIN: Primary | ICD-10-CM

## 2024-10-10 PROCEDURE — RECHECK

## 2024-10-11 ENCOUNTER — APPOINTMENT (OUTPATIENT)
Dept: LAB | Facility: AMBULARY SURGERY CENTER | Age: 59
End: 2024-10-11
Payer: COMMERCIAL

## 2024-10-11 ENCOUNTER — ANESTHESIA EVENT (OUTPATIENT)
Dept: PERIOP | Facility: HOSPITAL | Age: 59
End: 2024-10-11
Payer: COMMERCIAL

## 2024-10-11 DIAGNOSIS — R39.89 SUSPECTED UTI: ICD-10-CM

## 2024-10-11 DIAGNOSIS — N20.1 URETERAL CALCULI: ICD-10-CM

## 2024-10-11 PROCEDURE — 87186 SC STD MICRODIL/AGAR DIL: CPT

## 2024-10-11 PROCEDURE — 87077 CULTURE AEROBIC IDENTIFY: CPT

## 2024-10-11 PROCEDURE — 87086 URINE CULTURE/COLONY COUNT: CPT

## 2024-10-13 ENCOUNTER — HOSPITAL ENCOUNTER (EMERGENCY)
Facility: HOSPITAL | Age: 59
Discharge: HOME | End: 2024-10-13
Attending: EMERGENCY MEDICINE
Payer: COMMERCIAL

## 2024-10-13 ENCOUNTER — APPOINTMENT (EMERGENCY)
Dept: CT IMAGING | Facility: HOSPITAL | Age: 59
End: 2024-10-13
Payer: COMMERCIAL

## 2024-10-13 VITALS
OXYGEN SATURATION: 98 % | DIASTOLIC BLOOD PRESSURE: 70 MMHG | HEART RATE: 82 BPM | SYSTOLIC BLOOD PRESSURE: 128 MMHG | RESPIRATION RATE: 18 BRPM | BODY MASS INDEX: 34.16 KG/M2 | WEIGHT: 205.25 LBS | TEMPERATURE: 99.2 F

## 2024-10-13 DIAGNOSIS — N20.0 KIDNEY STONE: ICD-10-CM

## 2024-10-13 DIAGNOSIS — Z96.0 URETERAL STENT PRESENT: ICD-10-CM

## 2024-10-13 DIAGNOSIS — N12 PYELONEPHRITIS: Primary | ICD-10-CM

## 2024-10-13 DIAGNOSIS — N39.0 UTI (URINARY TRACT INFECTION): ICD-10-CM

## 2024-10-13 DIAGNOSIS — N83.209 OVARIAN CYST: ICD-10-CM

## 2024-10-13 LAB
ALBUMIN SERPL BCG-MCNC: 4.4 G/DL (ref 3.5–5)
ALP SERPL-CCNC: 35 U/L (ref 34–104)
ALT SERPL W P-5'-P-CCNC: 26 U/L (ref 7–52)
ANION GAP SERPL CALCULATED.3IONS-SCNC: 8 MMOL/L (ref 4–13)
AST SERPL W P-5'-P-CCNC: 21 U/L (ref 13–39)
BACTERIA UR CULT: ABNORMAL
BASOPHILS # BLD AUTO: 0.05 THOUSANDS/ΜL (ref 0–0.1)
BASOPHILS NFR BLD AUTO: 1 % (ref 0–1)
BILIRUB SERPL-MCNC: 0.5 MG/DL (ref 0.2–1)
BUN SERPL-MCNC: 14 MG/DL (ref 5–25)
CALCIUM SERPL-MCNC: 9.8 MG/DL (ref 8.4–10.2)
CHLORIDE SERPL-SCNC: 105 MMOL/L (ref 96–108)
CO2 SERPL-SCNC: 26 MMOL/L (ref 21–32)
CREAT SERPL-MCNC: 1.01 MG/DL (ref 0.6–1.3)
EOSINOPHIL # BLD AUTO: 0.19 THOUSAND/ΜL (ref 0–0.61)
EOSINOPHIL NFR BLD AUTO: 2 % (ref 0–6)
ERYTHROCYTE [DISTWIDTH] IN BLOOD BY AUTOMATED COUNT: 12.4 % (ref 11.6–15.1)
GFR SERPL CREATININE-BSD FRML MDRD: 61 ML/MIN/1.73SQ M
GLUCOSE SERPL-MCNC: 92 MG/DL (ref 65–140)
HCT VFR BLD AUTO: 40.5 % (ref 34.8–46.1)
HGB BLD-MCNC: 13.5 G/DL (ref 11.5–15.4)
IMM GRANULOCYTES # BLD AUTO: 0.02 THOUSAND/UL (ref 0–0.2)
IMM GRANULOCYTES NFR BLD AUTO: 0 % (ref 0–2)
LACTATE SERPL-SCNC: 1 MMOL/L (ref 0.5–2)
LYMPHOCYTES # BLD AUTO: 2.91 THOUSANDS/ΜL (ref 0.6–4.47)
LYMPHOCYTES NFR BLD AUTO: 30 % (ref 14–44)
MCH RBC QN AUTO: 30.3 PG (ref 26.8–34.3)
MCHC RBC AUTO-ENTMCNC: 33.3 G/DL (ref 31.4–37.4)
MCV RBC AUTO: 91 FL (ref 82–98)
MONOCYTES # BLD AUTO: 1.06 THOUSAND/ΜL (ref 0.17–1.22)
MONOCYTES NFR BLD AUTO: 11 % (ref 4–12)
NEUTROPHILS # BLD AUTO: 5.41 THOUSANDS/ΜL (ref 1.85–7.62)
NEUTS SEG NFR BLD AUTO: 56 % (ref 43–75)
NRBC BLD AUTO-RTO: 0 /100 WBCS
PLATELET # BLD AUTO: 249 THOUSANDS/UL (ref 149–390)
PMV BLD AUTO: 9.5 FL (ref 8.9–12.7)
POTASSIUM SERPL-SCNC: 3.7 MMOL/L (ref 3.5–5.3)
PROCALCITONIN SERPL-MCNC: <0.05 NG/ML
PROT SERPL-MCNC: 7.8 G/DL (ref 6.4–8.4)
RBC # BLD AUTO: 4.45 MILLION/UL (ref 3.81–5.12)
SODIUM SERPL-SCNC: 139 MMOL/L (ref 135–147)
WBC # BLD AUTO: 9.64 THOUSAND/UL (ref 4.31–10.16)

## 2024-10-13 PROCEDURE — 96375 TX/PRO/DX INJ NEW DRUG ADDON: CPT

## 2024-10-13 PROCEDURE — 74177 CT ABD & PELVIS W/CONTRAST: CPT

## 2024-10-13 PROCEDURE — 80053 COMPREHEN METABOLIC PANEL: CPT | Performed by: EMERGENCY MEDICINE

## 2024-10-13 PROCEDURE — 36415 COLL VENOUS BLD VENIPUNCTURE: CPT | Performed by: EMERGENCY MEDICINE

## 2024-10-13 PROCEDURE — 96361 HYDRATE IV INFUSION ADD-ON: CPT

## 2024-10-13 PROCEDURE — 99284 EMERGENCY DEPT VISIT MOD MDM: CPT

## 2024-10-13 PROCEDURE — 99285 EMERGENCY DEPT VISIT HI MDM: CPT | Performed by: EMERGENCY MEDICINE

## 2024-10-13 PROCEDURE — 84145 PROCALCITONIN (PCT): CPT | Performed by: EMERGENCY MEDICINE

## 2024-10-13 PROCEDURE — 96365 THER/PROPH/DIAG IV INF INIT: CPT

## 2024-10-13 PROCEDURE — 85025 COMPLETE CBC W/AUTO DIFF WBC: CPT | Performed by: EMERGENCY MEDICINE

## 2024-10-13 PROCEDURE — 83605 ASSAY OF LACTIC ACID: CPT | Performed by: EMERGENCY MEDICINE

## 2024-10-13 RX ORDER — PHENAZOPYRIDINE HYDROCHLORIDE 200 MG/1
200 TABLET, FILM COATED ORAL 3 TIMES DAILY
Qty: 6 TABLET | Refills: 0 | Status: SHIPPED | OUTPATIENT
Start: 2024-10-13 | End: 2024-10-25

## 2024-10-13 RX ORDER — PHENAZOPYRIDINE HYDROCHLORIDE 100 MG/1
100 TABLET, FILM COATED ORAL ONCE
Status: COMPLETED | OUTPATIENT
Start: 2024-10-13 | End: 2024-10-13

## 2024-10-13 RX ORDER — TAMSULOSIN HYDROCHLORIDE 0.4 MG/1
0.4 CAPSULE ORAL ONCE
Status: COMPLETED | OUTPATIENT
Start: 2024-10-13 | End: 2024-10-13

## 2024-10-13 RX ORDER — TAMSULOSIN HYDROCHLORIDE 0.4 MG/1
0.4 CAPSULE ORAL
Qty: 10 CAPSULE | Refills: 0 | Status: SHIPPED | OUTPATIENT
Start: 2024-10-13 | End: 2024-10-25

## 2024-10-13 RX ORDER — LACTOBACILLUS ACIDOPH-L.BULGARICUS 1 MILLION CELL CHEWABLE TABLET 1MM CELL
1 TABLET,CHEWABLE ORAL
Qty: 21 TABLET | Refills: 0 | Status: SHIPPED | OUTPATIENT
Start: 2024-10-13 | End: 2024-10-20

## 2024-10-13 RX ORDER — CIPROFLOXACIN 2 MG/ML
400 INJECTION, SOLUTION INTRAVENOUS ONCE
Status: COMPLETED | OUTPATIENT
Start: 2024-10-13 | End: 2024-10-13

## 2024-10-13 RX ORDER — CIPROFLOXACIN 500 MG/1
500 TABLET, FILM COATED ORAL 2 TIMES DAILY
Qty: 20 TABLET | Refills: 0 | Status: SHIPPED | OUTPATIENT
Start: 2024-10-13 | End: 2024-10-24

## 2024-10-13 RX ORDER — OXYBUTYNIN CHLORIDE 5 MG/1
5 TABLET, EXTENDED RELEASE ORAL DAILY
Qty: 10 TABLET | Refills: 0 | Status: SHIPPED | OUTPATIENT
Start: 2024-10-13 | End: 2024-10-25

## 2024-10-13 RX ORDER — OXYBUTYNIN CHLORIDE 5 MG/1
5 TABLET, EXTENDED RELEASE ORAL ONCE
Status: COMPLETED | OUTPATIENT
Start: 2024-10-13 | End: 2024-10-13

## 2024-10-13 RX ORDER — TRAMADOL HYDROCHLORIDE 50 MG/1
50 TABLET ORAL EVERY 6 HOURS PRN
Qty: 4 TABLET | Refills: 0 | Status: SHIPPED | OUTPATIENT
Start: 2024-10-13 | End: 2024-10-14

## 2024-10-13 RX ORDER — KETOROLAC TROMETHAMINE 30 MG/ML
30 INJECTION, SOLUTION INTRAMUSCULAR; INTRAVENOUS ONCE
Status: COMPLETED | OUTPATIENT
Start: 2024-10-13 | End: 2024-10-13

## 2024-10-13 RX ADMIN — TAMSULOSIN HYDROCHLORIDE 0.4 MG: 0.4 CAPSULE ORAL at 22:21

## 2024-10-13 RX ADMIN — OXYBUTYNIN CHLORIDE 5 MG: 5 TABLET, EXTENDED RELEASE ORAL at 22:21

## 2024-10-13 RX ADMIN — SODIUM CHLORIDE 1000 ML: 0.9 INJECTION, SOLUTION INTRAVENOUS at 19:19

## 2024-10-13 RX ADMIN — IOHEXOL 100 ML: 350 INJECTION, SOLUTION INTRAVENOUS at 19:50

## 2024-10-13 RX ADMIN — CIPROFLOXACIN 400 MG: 2 INJECTION, SOLUTION INTRAVENOUS at 20:20

## 2024-10-13 RX ADMIN — KETOROLAC TROMETHAMINE 30 MG: 30 INJECTION, SOLUTION INTRAMUSCULAR; INTRAVENOUS at 22:22

## 2024-10-13 RX ADMIN — PHENAZOPYRIDINE 100 MG: 100 TABLET ORAL at 22:21

## 2024-10-13 NOTE — Clinical Note
Maria Luz Ponce was seen and treated in our emergency department on 10/13/2024.        Other - See Comments        Diagnosis:     Maria Luz  may return to work on return date.    She may return on this date: 10/16/2024    Patient was seen in the emergency department for medical emergency.  Please excuse patient until her return to work date above     If you have any questions or concerns, please don't hesitate to call.      Ana Maria Berry, DO    ______________________________           _______________          _______________  Hospital Representative                              Date                                Time

## 2024-10-13 NOTE — ED PROVIDER NOTES
Time reflects when diagnosis was documented in both MDM as applicable and the Disposition within this note       Time User Action Codes Description Comment    10/13/2024  9:58 PM Bendock, Ana Maria L Add [N12] Pyelonephritis     10/13/2024  9:58 PM Bendock, Ana Maria L Add [N39.0] UTI (urinary tract infection)     10/13/2024  9:58 PM Bendock, Ana Maria L Add [N83.209] Ovarian cyst     10/13/2024  9:58 PM Bendock, Ana Maria L Add [N20.0] Kidney stone     10/13/2024  9:58 PM Bendock, Ana Maria L Add [Z96.0] Ureteral stent present           ED Disposition       ED Disposition   Discharge    Condition   Stable    Date/Time   Sun Oct 13, 2024 10:03 PM    Comment   Maria Luz Ponce discharge to home/self care.                   Assessment & Plan       Medical Decision Making    Differential diagnosis includes but not limited to recurrent UTI versus pyelonephritis versus infected stone versus stent migration.  Did consider diverticulitis versus perforation    Amount and/or Complexity of Data Reviewed  Independent Historian: spouse     Details:    at bedside      External Data Reviewed: notes.     Details:   Patient was admitted to the hospital and discharged on September 15, 2024 after which patient was admitted for left 4 mm UPJ calculus with hydronephrosis and positive UTI where she had a cystoscopy and ureteral stent placement.  Received IV ceftriaxone for 3 days and discharged on Omnicef for 7 days for total of 10 days.    Patient did follow-up with urology and seen on October 4.  Plan for future cystoscopy with ureteroscopy, lithotripsy, stone extraction retrograde pyelogram.  She also has a surgery scheduled on November 13 for total laparoscopic hysterectomy and bilateral salpingo-oophorectomy      Labs: ordered. Decision-making details documented in ED Course.     Details:   No anemia, thrombocytopenia or leukocytosis  No acute kidney injury or electrolyte dysfunction  Lactic acid within normal range  procalcitonin within  "normal range          Radiology: ordered. Decision-making details documented in ED Course.     Details:   CT abdomen pelvis interpreted by radiologist as    1.  Mildly heterogeneous left renal cortex suggestive of acute pyelonephritis given the clinical presentation. Left ureteral stent is in place with 3 mm adjacent stone just distal to the UPJ. No hydronephrosis.     2.  Bladder wall thickening likely related to cystitis.     3.  5.1 cm left adnexal cyst. See recent ultrasound for further evaluation.     The study was marked in EPIC for immediate notification.       Discussion of management or test interpretation with external provider(s): Urology    Risk  OTC drugs.  Prescription drug management.        ED Course as of 10/13/24 2304   Sun Oct 13, 2024   1822 Patient is a 59-year-old female coming in with recurrent urinary frequency, urgency, dysuria and onset of chills and low back pain last night.  On exam patient is resting in bed in no distress.  Will reach out to urology and patient agreeable    Disclosure: Voice to text software was used in the preparation of this document and could have resulted in translational errors.      Occasional wrong word or \"sound a like\" substitutions may have occurred due to the inherent limitations of voice recognition software.  Read the chart carefully and recognize, using context, where substitutions have occurred.       I have independently reviewed external records are available to me to the level of detail possible within the time constraints of my patient care responsibilities in the ED.       1849 Spoke with Ledy from urology.  Wishes for labs as well as CT.  Will give dose of antibiotics as well.  Patient has an E. coli positive urine culture pending discussion with urology for appropriate antibiotics       1855 Did discuss with Urology, will change antibiotic to Quinolone       1907 Patient and  updated on my discussion with urology.  Agreeable plan of " care       1938 CBC and CMP within normal range       1945   Lactic acid and procalcitonin within normal range.  No evidence of septicemia       2132 1.  Mildly heterogeneous left renal cortex suggestive of acute pyelonephritis given the clinical presentation. Left ureteral stent is in place with 3 mm adjacent stone just distal to the UPJ. No hydronephrosis.     2.  Bladder wall thickening likely related to cystitis.     3.  5.1 cm left adnexal cyst. See recent ultrasound for further evaluation.     The study was marked in EPIC for immediate notification.         Message to Urology sent       2151 Discussed case with Lorna from urology.  Reviewed labs, workup and history and physical.  Wishes for 10-day course of Cipro as well as discussed with her ibuprofen, Tylenol, Flomax, Pyridium and oxybutynin as well as heating pad and increased p.o. intake       2157 Patient was updated on my discussion with urology.  She has reviewed CT findings as well.  We discussed lab work as well as CT findings with concern for pyelonephritis.  There has been continuation of stone present in the left ureter as well as ovarian cyst.      Patient is aware we will be giving her 10 days of Cipro and will give lactobacillus to help with probiotics.  Will also DC home and give first dose here of Flomax and oxybutynin along with Pyridium.  Discussed with patient use of heating pad, alternating Tylenol Motrin as well as will give a days worth of tramadol for extreme pain      Instructed patient to call urology within the next 1 to 2 days.  Strict return to ER instructions given for patient and family.  They are aware.  Patient is resting in bed in no distress.  Hemodynamically stable nonseptic.    Counseling: I had a detailed discussion with the patient and/or guardian regarding: the historical points, exam findings, and any diagnostic results supporting the discharge diagnosis, lab results, radiology results, discharge instructions reviewed  with patient and/or family/caregiver and understanding was verbalized. Instructions given to return to the emergency department if symptoms worsen or persist, or if there are any questions or concerns that arise at home.     All imaging and/or lab testing discussed with patient, strict return to ED precautions discussed. Patient recommended to follow up promptly with appropriate outpatient provider. Patient and/or family members verbalizes understanding and agrees with plan. Patient and/or family members were given opportunity to ask questions, all questions were answered at this time. Patient is stable for discharge           Medications   sodium chloride 0.9 % bolus 1,000 mL (0 mL Intravenous Stopped 10/13/24 2020)   ciprofloxacin (CIPRO) IVPB (premix in 5% dextrose) 400 mg 200 mL (0 mg Intravenous Stopped 10/13/24 2121)   iohexol (OMNIPAQUE) 350 MG/ML injection (MULTI-DOSE) 100 mL (100 mL Intravenous Given 10/13/24 1950)   phenazopyridine (PYRIDIUM) tablet 100 mg (100 mg Oral Given 10/13/24 2221)   oxybutynin (DITROPAN-XL) 24 hr tablet 5 mg (5 mg Oral Given 10/13/24 2221)   tamsulosin (FLOMAX) capsule 0.4 mg (0.4 mg Oral Given 10/13/24 2221)   ketorolac (TORADOL) injection 30 mg (30 mg Intravenous Given 10/13/24 2222)       ED Risk Strat Scores                         SBIRT 20yo+      Flowsheet Row Most Recent Value   Initial Alcohol Screen: US AUDIT-C     1. How often do you have a drink containing alcohol? 0 Filed at: 10/13/2024 2003   2. How many drinks containing alcohol do you have on a typical day you are drinking?  0 Filed at: 10/13/2024 2003   3a. Male UNDER 65: How often do you have five or more drinks on one occasion? 0 Filed at: 10/13/2024 2003   3b. FEMALE Any Age, or MALE 65+: How often do you have 4 or more drinks on one occassion? 0 Filed at: 10/13/2024 2003   Audit-C Score 0 Filed at: 10/13/2024 2003   RABIA: How many times in the past year have you...    Used an illegal drug or used a prescription  medication for non-medical reasons? Never Filed at: 10/13/2024 2003                            History of Present Illness       Chief Complaint   Patient presents with    Possible UTI     Pt reports recently admitted for UTI, pt now c/o b/l flank pain, burning with urination and frequency.        Past Medical History:   Diagnosis Date    Ankle fracture     Asthma     Cervical spinal stenosis     Closed left arm fracture, sequela     Concussion     Degeneration, intervertebral disc, cervical     Hypertension     Kidney stone     Migraine     Mixed hyperlipidemia 1/5/2021    Right arm fracture     Seasonal allergies     Shingles     Vitamin D deficiency       Past Surgical History:   Procedure Laterality Date    CARPAL TUNNEL RELEASE Bilateral     CATARACT EXTRACTION Left     FL RETROGRADE PYELOGRAM  9/13/2024    MOUTH SURGERY      NM COLONOSCOPY FLX DX W/COLLJ SPEC WHEN PFRMD N/A 12/27/2018    Procedure: COLONOSCOPY;  Surgeon: Bita Montalvo MD;  Location: [facility];  Service: Gastroenterology    NM CYSTO/URETERO W/LITHOTRIPSY &INDWELL STENT INSRT Left 9/13/2024    Procedure: CYSTOSCOPY URETEROSCOPY WITH RETROGRADE PYELOGRAM AND INSERTION STENT URETERAL;  Surgeon: Eder Johns MD;  Location: [facility];  Service: Urology    SINUS SURGERY      TUBAL LIGATION        Family History   Problem Relation Age of Onset    Heart disease Father     No Known Problems Daughter     No Known Problems Daughter     No Known Problems Daughter     No Known Problems Maternal Grandmother     No Known Problems Maternal Grandfather     No Known Problems Paternal Grandmother     No Known Problems Paternal Grandfather     Uterine cancer Maternal Aunt 75    Lung cancer Paternal Aunt         age unknown      Social History     Tobacco Use    Smoking status: Never    Smokeless tobacco: Never   Vaping Use    Vaping status: Never Used   Substance Use Topics    Alcohol use: Not Currently     Comment: social    Drug use: No       E-Cigarette/Vaping    E-Cigarette Use Never User       E-Cigarette/Vaping Substances    Nicotine No     THC No     CBD No     Flavoring No     Other No     Unknown No       I have reviewed and agree with the history as documented.     Patient is a 59-year-old female with a history of left ureteral calculus status post stent associated with UTI and ureteral stent, hypertension, hyperlipidemia, GERD, cyst on left ovary coming in today for positive urine culture.  Patient states that she completed her full course of antibiotics after discharge in September.  She has a scheduled stent exchange as well as stone extraction on October 25 with urology.  She followed up in the office and was to have a UA as well as culture 2 weeks before scheduled surgery.  Her urine culture came back positive.  Patient reports for the past 3 days of urinary frequency, urgency and dysuria which is new as her symptoms have resolved after antibiotics.  She also developed chills last night with lower back pain.        History provided by:  Medical records, patient and spouse   used: No    Evaluation of Abnormal Diagnostic Test  Time since result:  10/11  Patient referred by:  Specialist  Resulting agency:  [facility]  Result type: cultures    Cultures:     Urine culture: abnormal        Review of Systems   Constitutional:  Positive for chills. Negative for fever.   HENT: Negative.  Negative for ear pain and sore throat.    Eyes: Negative.  Negative for pain and visual disturbance.   Respiratory: Negative.  Negative for cough and shortness of breath.    Cardiovascular: Negative.  Negative for chest pain and palpitations.   Gastrointestinal: Negative.  Negative for abdominal pain and vomiting.   Genitourinary:  Positive for frequency and urgency. Negative for dysuria and hematuria.   Musculoskeletal:  Positive for back pain. Negative for arthralgias.   Skin: Negative.  Negative for color change and rash.   Neurological:  Negative.  Negative for seizures and syncope.   Hematological: Negative.    Psychiatric/Behavioral: Negative.     All other systems reviewed and are negative.          Objective       ED Triage Vitals   Temperature Pulse Blood Pressure Respirations SpO2 Patient Position - Orthostatic VS   10/13/24 1811 10/13/24 1811 10/13/24 1811 10/13/24 1811 10/13/24 1811 10/13/24 2021   99.2 °F (37.3 °C) 89 130/67 18 97 % Sitting      Temp Source Heart Rate Source BP Location FiO2 (%) Pain Score    10/13/24 1811 10/13/24 1811 10/13/24 1811 -- 10/13/24 2222    Oral Monitor Right arm  No Pain      Vitals      Date and Time Temp Pulse SpO2 Resp BP Pain Score FACES Pain Rating User   10/13/24 2227 -- 82 98 % 18 128/70 -- -- [facility]   10/13/24 2222 -- -- -- -- -- No Pain -- [facility]   10/13/24 2021 -- 86 97 % 18 122/66 -- -- [facility]   10/13/24 1811 99.2 °F (37.3 °C) 89 97 % 18 130/67 -- -- [facility]            Physical Exam  Vitals and nursing note reviewed.   Constitutional:       General: She is awake. She is not in acute distress.     Appearance: Normal appearance. She is well-developed and overweight.   HENT:      Head: Normocephalic and atraumatic.      Right Ear: External ear normal.      Left Ear: External ear normal.      Nose: Nose normal.      Mouth/Throat:      Mouth: Mucous membranes are moist.   Eyes:      General: Lids are normal. Gaze aligned appropriately.      Extraocular Movements: Extraocular movements intact.      Conjunctiva/sclera: Conjunctivae normal.      Pupils: Pupils are equal, round, and reactive to light.   Neck:      Trachea: Trachea normal.   Cardiovascular:      Rate and Rhythm: Normal rate and regular rhythm.      Pulses:           Radial pulses are 2+ on the right side and 2+ on the left side.        Dorsalis pedis pulses are 2+ on the right side and 2+ on the left side.      Heart sounds: Normal heart sounds, S1 normal and S2 normal. No murmur heard.  Pulmonary:      Effort: Pulmonary effort is normal. No respiratory  distress.      Breath sounds: Normal breath sounds.      Comments: No conversational dyspnea  Abdominal:      General: Bowel sounds are normal.      Palpations: Abdomen is soft.      Tenderness: There is no abdominal tenderness. There is left CVA tenderness. There is no right CVA tenderness, guarding or rebound. Negative signs include Cano's sign, Rovsing's sign and McBurney's sign.   Musculoskeletal:         General: No swelling.      Cervical back: Normal range of motion and neck supple.      Right lower leg: No edema.      Left lower leg: No edema.   Skin:     General: Skin is warm and dry.      Capillary Refill: Capillary refill takes less than 2 seconds.   Neurological:      General: No focal deficit present.      Mental Status: She is alert and oriented to person, place, and time.      GCS: GCS eye subscore is 4. GCS verbal subscore is 5. GCS motor subscore is 6.      Cranial Nerves: Cranial nerves 2-12 are intact.      Sensory: Sensation is intact.      Motor: Motor function is intact.      Coordination: Coordination is intact.   Psychiatric:         Mood and Affect: Mood normal.         Behavior: Behavior normal. Behavior is cooperative.         Results Reviewed       Procedure Component Value Units Date/Time    Lactic acid, plasma (w/reflex if result > 2.0) [[id number]]  (Normal) Collected: 10/13/24 1905    Lab Status: Final result Specimen: Blood from Arm, Left Updated: 10/13/24 1944     LACTIC ACID 1.0 mmol/L     Narrative:      Result may be elevated if tourniquet was used during collection.    Procalcitonin [[id number]]  (Normal) Collected: 10/13/24 1905    Lab Status: Final result Specimen: Blood from Arm, Left Updated: 10/13/24 1939     Procalcitonin <0.05 ng/ml     Comprehensive metabolic panel [[id number]] Collected: 10/13/24 1905    Lab Status: Final result Specimen: Blood from Arm, Left Updated: 10/13/24 1930     Sodium 139 mmol/L      Potassium 3.7 mmol/L      Chloride 105 mmol/L      CO2 26  mmol/L      ANION GAP 8 mmol/L      BUN 14 mg/dL      Creatinine 1.01 mg/dL      Glucose 92 mg/dL      Calcium 9.8 mg/dL      AST 21 U/L      ALT 26 U/L      Alkaline Phosphatase 35 U/L      Total Protein 7.8 g/dL      Albumin 4.4 g/dL      Total Bilirubin 0.50 mg/dL      eGFR 61 ml/min/1.73sq m     Narrative:      National Kid[employer] Disease Foundation guidelines for Chronic Kidney Disease (CKD):     Stage 1 with normal or high GFR (GFR > 90 mL/min/1.73 square meters)    Stage 2 Mild CKD (GFR = 60-89 mL/min/1.73 square meters)    Stage 3A Moderate CKD (GFR = 45-59 mL/min/1.73 square meters)    Stage 3B Moderate CKD (GFR = 30-44 mL/min/1.73 square meters)    Stage 4 Severe CKD (GFR = 15-29 mL/min/1.73 square meters)    Stage 5 End Stage CKD (GFR <15 mL/min/1.73 square meters)  Note: GFR calculation is accurate only with a steady state creatinine    CBC and differential [[id number]] Collected: 10/13/24 1905    Lab Status: Final result Specimen: Blood from Arm, Left Updated: 10/13/24 1915     WBC 9.64 Thousand/uL      RBC 4.45 Million/uL      Hemoglobin 13.5 g/dL      Hematocrit 40.5 %      MCV 91 fL      MCH 30.3 pg      MCHC 33.3 g/dL      RDW 12.4 %      MPV 9.5 fL      Platelets 249 Thousands/uL      nRBC 0 /100 WBCs      Segmented % 56 %      Immature Grans % 0 %      Lymphocytes % 30 %      Monocytes % 11 %      Eosinophils Relative 2 %      Basophils Relative 1 %      Absolute Neutrophils 5.41 Thousands/µL      Absolute Immature Grans 0.02 Thousand/uL      Absolute Lymphocytes 2.91 Thousands/µL      Absolute Monocytes 1.06 Thousand/µL      Eosinophils Absolute 0.19 Thousand/µL      Basophils Absolute 0.05 Thousands/µL             CT abdomen pelvis with contrast   Final Interpretation by Juvenal Kim MD (10/13 2128)      1.  Mildly heterogeneous left renal cortex suggestive of acute pyelonephritis given the clinical presentation. Left ureteral stent is in place with 3 mm adjacent stone just distal to the UPJ. No  hydronephrosis.      2.  Bladder wall thickening likely related to cystitis.      3.  5.1 cm left adnexal cyst. See recent ultrasound for further evaluation.      The study was marked in EPIC for immediate notification.      Workstation performed: [id number]             Procedures    ED Medication and Procedure Management   Prior to Admission Medications   Prescriptions Last Dose Informant Patient Reported? Taking?   Ascorbic Acid (VITAMIN C) 100 MG tablet  Self Yes No   Sig: Take by mouth daily   Cholecalciferol (VITAMIN D3) 3000 units TABS  Self Yes No   Sig: Take 3,000 Units by mouth daily   DULoxetine (CYMBALTA) 60 mg delayed release capsule  Self Yes No   Diclofenac Sodium (VOLTAREN) 1 %  Self Yes No   Sig: if needed   Omega-3 1000 MG CAPS  Self Yes No   Sig: Take by mouth daily   albuterol (PROVENTIL HFA,VENTOLIN HFA) 90 mcg/act inhaler  Self Yes No   Sig: Inhale 2 puffs if needed   dicyclomine (BENTYL) 10 mg capsule  Self No No   Sig: TAKE 1 CAPSULE (10 MG TOTAL) BY MOUTH 3 (THREE) TIMES A DAY AS NEEDED (DIARRHEA AND ABDOMINAL PAIN)   famotidine (PEPCID) 10 mg tablet  Self Yes No   Sig: Take 10 mg by mouth 2 (two) times a day   fexofenadine (ALLEGRA) 180 MG tablet  Self Yes No   Sig: Take by mouth as needed   fluticasone (FLONASE) 50 mcg/act nasal spray  Self No No   Sig: SPRAY 2 SPRAYS INTO EACH NOSTRIL 2 TIMES A DAY   fluticasone-salmeterol (ADVAIR, WIXELA) 100-50 mcg/dose inhaler  Self Yes No   Sig: Inhale 1 puff 2 (two) times a day   gabapentin (NEURONTIN) 100 mg capsule  Self Yes No   Sig: Take 200 mg by mouth daily at bedtime   ipratropium (ATROVENT) 0.06 % nasal spray  Self No No   Sig: SPRAY 2 SPRAYS INTO EACH NOSTRIL 3 TIMES A DAY.   melatonin 3 mg  Self Yes No   Sig: Take 3 mg by mouth daily at bedtime   meloxicam (MOBIC) 15 mg tablet  Self Yes No   Sig: Take 15 mg by mouth daily   methocarbamol (ROBAXIN) 750 mg tablet  Self Yes No   Sig: Take 750 mg by mouth if needed   sodium chloride (OCEAN) 0.65  % nasal spray  Self No No   Si sprays into each nostril every 2 (two) hours while awake   Patient taking differently: 2 sprays into each nostril as needed   tirzepatide (Zepbound) 5 mg/0.5 mL auto-injector  Self No No   Sig: Inject 0.5 mL (5 mg total) under the skin once a week   valsartan (DIOVAN) 160 mg tablet  Self Yes No   Sig: Take 160 mg by mouth daily      Facility-Administered Medications: None     Discharge Medication List as of 10/13/2024 10:05 PM        START taking these medications    Details   ciprofloxacin (CIPRO) 500 mg tablet Take 1 tablet (500 mg total) by mouth 2 (two) times a day for 10 days, Starting Sun 10/13/2024, Until Wed 10/23/2024, Normal      lactobacillus acidophilus-bulgaricus (LACTINEX) chewable tablet Chew 1 tablet 3 (three) times a day with meals for 7 days, Starting Sun 10/13/2024, Until Sun 10/20/2024, Normal      oxybutynin (DITROPAN-XL) 5 mg 24 hr tablet Take 1 tablet (5 mg total) by mouth daily for 10 days, Starting Sun 10/13/2024, Until Wed 10/23/2024, Normal      phenazopyridine (PYRIDIUM) 200 mg tablet Take 1 tablet (200 mg total) by mouth 3 (three) times a day, Starting Sun 10/13/2024, Normal      tamsulosin (FLOMAX) 0.4 mg Take 1 capsule (0.4 mg total) by mouth daily with dinner for 10 days, Starting Sun 10/13/2024, Until Wed 10/23/2024, Normal      traMADol (Ultram) 50 mg tablet Take 1 tablet (50 mg total) by mouth every 6 (six) hours as needed for moderate pain for up to 1 day, Starting Sun 10/13/2024, Until Mon 10/14/2024 at 2359, Normal           CONTINUE these medications which have NOT CHANGED    Details   albuterol (PROVENTIL HFA,VENTOLIN HFA) 90 mcg/act inhaler Inhale 2 puffs if needed, Starting 2017, Historical Med      Ascorbic Acid (VITAMIN C) 100 MG tablet Take by mouth daily, Historical Med      Cholecalciferol (VITAMIN D3) 3000 units TABS Take 3,000 Units by mouth daily, Historical Med      Diclofenac Sodium (VOLTAREN) 1 % if needed, Historical  Med      dicyclomine (BENTYL) 10 mg capsule TAKE 1 CAPSULE (10 MG TOTAL) BY MOUTH 3 (THREE) TIMES A DAY AS NEEDED (DIARRHEA AND ABDOMINAL PAIN), Starting Thu 8/1/2024, Until Mon 2/17/2025 at 2359, Normal      DULoxetine (CYMBALTA) 60 mg delayed release capsule Historical Med      famotidine (PEPCID) 10 mg tablet Take 10 mg by mouth 2 (two) times a day, Historical Med      fexofenadine (ALLEGRA) 180 MG tablet Take by mouth as needed, Historical Med      fluticasone (FLONASE) 50 mcg/act nasal spray SPRAY 2 SPRAYS INTO EACH NOSTRIL 2 TIMES A DAY, Normal      fluticasone-salmeterol (ADVAIR, WIXELA) 100-50 mcg/dose inhaler Inhale 1 puff 2 (two) times a day, Starting Tue 12/22/2020, Until Fri 10/4/2024, Historical Med      gabapentin (NEURONTIN) 100 mg capsule Take 200 mg by mouth daily at bedtime, Starting Tue 4/23/2024, Historical Med      ipratropium (ATROVENT) 0.06 % nasal spray SPRAY 2 SPRAYS INTO EACH NOSTRIL 3 TIMES A DAY., Normal      melatonin 3 mg Take 3 mg by mouth daily at bedtime, Historical Med      meloxicam (MOBIC) 15 mg tablet Take 15 mg by mouth daily, Starting Tue 8/14/2018, Historical Med      methocarbamol (ROBAXIN) 750 mg tablet Take 750 mg by mouth if needed, Historical Med      Omega-3 1000 MG CAPS Take by mouth daily, Historical Med      sodium chloride (OCEAN) 0.65 % nasal spray 2 sprays into each nostril every 2 (two) hours while awake, Starting Tue 1/3/2023, Until Fri 10/4/2024, Normal      tirzepatide (Zepbound) 5 mg/0.5 mL auto-injector Inject 0.5 mL (5 mg total) under the skin once a week, Starting Wed 9/18/2024, Until Wed 11/13/2024, Normal      valsartan (DIOVAN) 160 mg tablet Take 160 mg by mouth daily, Starting Tue 9/10/2024, Until Wed 9/10/2025, Historical Med           No discharge procedures on file.  ED SEPSIS DOCUMENTATION   Time reflects when diagnosis was documented in both MDM as applicable and the Disposition within this note       Time User Action Codes Description Comment     10/13/2024  9:58 PM BendAna Maria morelos Add [N12] Pyelonephritis     10/13/2024  9:58 PM BendAna Maria morelos Add [N39.0] UTI (urinary tract infection)     10/13/2024  9:58 PM BendAna Maria morelos Add [N83.209] Ovarian cyst     10/13/2024  9:58 PM BendAna Maria morelos Add [N20.0] Kidney stone     10/13/2024  9:58 PM BendAna Maria morelos Add [Z96.0] Ureteral stent present                  Ana Maria Berry DO  10/13/24 [id number]

## 2024-10-14 NOTE — ED NOTES
Patient came back from CT stating she had an episode of vomiting from the IV contrast. Patient has no symptoms or pain. Patient declines needing any medications. Patient given tooth brush, tooth paste, and belongings bag.     Marysol Herring RN  10/13/24 2029       Marysol Herring RN  10/13/24 2050

## 2024-10-14 NOTE — DISCHARGE INSTRUCTIONS
As discussed, you will be placed on 10-day course of Cipro.  This was sent into your pharmacy along with probiotic  Alternate Tylenol and Motrin every 6-8 hours for pain control as well as using a heating pad  You are also given Pyridium and oxybutynin to help with your urinary symptoms.  Please call urology within the next 1 to 2 days for close follow-up  If you develop any worsening pain, fevers while on antibiotics, worsening urinary symptoms, or any other concerning symptoms do not hesitate to return to the ER or call 911  Increase your water intake as well

## 2024-10-15 DIAGNOSIS — N20.1 URETERAL CALCULI: Primary | ICD-10-CM

## 2024-10-15 RX ORDER — SULFAMETHOXAZOLE AND TRIMETHOPRIM 800; 160 MG/1; MG/1
1 TABLET ORAL 2 TIMES DAILY
Qty: 14 TABLET | Refills: 0 | Status: SHIPPED | OUTPATIENT
Start: 2024-10-15 | End: 2024-10-24

## 2024-10-17 ENCOUNTER — CLINICAL SUPPORT (OUTPATIENT)
Dept: BARIATRICS | Facility: CLINIC | Age: 59
End: 2024-10-17

## 2024-10-17 VITALS — WEIGHT: 206.8 LBS | HEIGHT: 65 IN | BODY MASS INDEX: 34.45 KG/M2

## 2024-10-17 DIAGNOSIS — R63.5 ABNORMAL WEIGHT GAIN: Primary | ICD-10-CM

## 2024-10-17 PROCEDURE — RECHECK

## 2024-10-17 RX ORDER — TRAMADOL HYDROCHLORIDE 50 MG/1
50 TABLET ORAL EVERY 6 HOURS PRN
COMMUNITY

## 2024-10-17 RX ORDER — ACETAMINOPHEN 500 MG
1000 TABLET ORAL EVERY 6 HOURS PRN
COMMUNITY

## 2024-10-17 NOTE — PRE-PROCEDURE INSTRUCTIONS
Pre-Surgery Instructions:   Medication Instructions    acetaminophen (TYLENOL) 500 mg tablet Uses PRN- DO NOT take day of surgery- surgeon ordered for day of surgery.    albuterol (PROVENTIL HFA,VENTOLIN HFA) 90 mcg/act inhaler Uses PRN- OK to take day of surgery    Cholecalciferol (VITAMIN D3) 3000 units TABS Hold day of surgery.    ciprofloxacin (CIPRO) 500 mg tablet Hold day of surgery.    Diclofenac Sodium (VOLTAREN) 1 % Uses PRN- DO NOT take day of surgery    dicyclomine (BENTYL) 10 mg capsule Uses PRN- OK to take day of surgery    DULoxetine (CYMBALTA) 60 mg delayed release capsule Take night before surgery    famotidine (PEPCID) 10 mg tablet Take day of surgery.    fexofenadine (ALLEGRA) 180 MG tablet Uses PRN- OK to take day of surgery    fluticasone (FLONASE) 50 mcg/act nasal spray Uses PRN- OK to take day of surgery    fluticasone-salmeterol (ADVAIR, WIXELA) 100-50 mcg/dose inhaler Take day of surgery.    gabapentin (NEURONTIN) 100 mg capsule Take night before surgery    ipratropium (ATROVENT) 0.06 % nasal spray Uses PRN- OK to take day of surgery    melatonin 3 mg Take night before surgery    meloxicam (MOBIC) 15 mg tablet Stop taking 7 days prior to surgery.    methocarbamol (ROBAXIN) 750 mg tablet Uses PRN- DO NOT take day of surgery    Omega-3 1000 MG CAPS Stop taking 7 days prior to surgery.    oxybutynin (DITROPAN-XL) 5 mg 24 hr tablet Hold day of surgery.    Probiotic Product (PROBIOTIC BLEND PO) Stop taking 7 days prior to surgery.    sulfamethoxazole-trimethoprim (BACTRIM DS) 800-160 mg per tablet Hold day of surgery.    tamsulosin (FLOMAX) 0.4 mg Take night before surgery    TART CHERRY PO Stop taking 7 days prior to surgery.    tirzepatide (Zepbound) 5 mg/0.5 mL auto-injector Stopped for surgery- had last dose on 10/16/24    traMADol (ULTRAM) 50 mg tablet Uses PRN- OK to take day of surgery    valsartan (DIOVAN) 160 mg tablet Hold day of surgery.   Medication instructions for day surgery  reviewed. Please use only a sip of water to take your instructed medications. Avoid all over the counter vitamins, supplements and NSAIDS for one week prior to surgery per anesthesia guidelines. Tylenol is ok to take as needed.     You will receive a call one business day prior to surgery with an arrival time and hospital directions. If your surgery is scheduled on a Monday, the hospital will be calling you on the Friday prior to your surgery. If you have not heard from anyone by 8pm, please call the hospital supervisor through the hospital  at [phone]. ([place] [phone] or [place] [phone]).    Do not eat or drink anything after midnight the night before your surgery, including candy, mints, lifesavers, or chewing gum. Do not drink alcohol 24hrs before your surgery. Try not to smoke at least 24hrs before your surgery.       Follow the pre surgery showering instructions as listed in the “My Surgical Experience Booklet” or otherwise provided by your surgeon's office. Do not use a blade to shave the surgical area 1 week before surgery. It is okay to use a clean electric clippers up to 24 hours before surgery. Do not apply any lotions, creams, including makeup, cologne, deodorant, or perfumes after showering on the day of your surgery. Do not use dry shampoo, hair spray, hair gel, or any type of hair products.     No contact lenses, eye make-up, or artificial eyelashes. Remove nail polish, including gel polish, and any artificial, gel, or acrylic nails if possible. Remove all jewelry including rings and body piercing jewelry.     Wear causal clothing that is easy to take on and off. Consider your type of surgery.    Keep any valuables, jewelry, piercings at home. Please bring any specially ordered equipment (sling, braces) if indicated.    Arrange for a responsible person to drive you to and from the hospital on the day of your surgery. Please confirm the visitor policy for the day of your  procedure when you receive your phone call with an arrival time.     Call the surgeon's office with any new illnesses, exposures, or additional questions prior to surgery.    Please reference your “My Surgical Experience Booklet” for additional information to prepare for your upcoming surgery.

## 2024-10-19 DIAGNOSIS — E66.812 CLASS 2 SEVERE OBESITY DUE TO EXCESS CALORIES WITH SERIOUS COMORBIDITY AND BODY MASS INDEX (BMI) OF 37.0 TO 37.9 IN ADULT (HCC): ICD-10-CM

## 2024-10-19 DIAGNOSIS — E66.01 CLASS 2 SEVERE OBESITY DUE TO EXCESS CALORIES WITH SERIOUS COMORBIDITY AND BODY MASS INDEX (BMI) OF 37.0 TO 37.9 IN ADULT (HCC): ICD-10-CM

## 2024-10-23 ENCOUNTER — HOSPITAL ENCOUNTER (OUTPATIENT)
Facility: HOSPITAL | Age: 59
Setting detail: OBSERVATION
Discharge: HOME | End: 2024-10-24
Attending: EMERGENCY MEDICINE | Admitting: INTERNAL MEDICINE
Payer: OTHER MISCELLANEOUS

## 2024-10-23 ENCOUNTER — APPOINTMENT (EMERGENCY)
Dept: CT IMAGING | Facility: HOSPITAL | Age: 59
End: 2024-10-23
Payer: OTHER MISCELLANEOUS

## 2024-10-23 DIAGNOSIS — R55 SYNCOPE: ICD-10-CM

## 2024-10-23 DIAGNOSIS — Z96.0 URETERAL STENT PRESENT: ICD-10-CM

## 2024-10-23 DIAGNOSIS — N17.9 AKI (ACUTE KIDNEY INJURY) (HCC): Primary | ICD-10-CM

## 2024-10-23 DIAGNOSIS — S09.90XA TRAUMATIC INJURY OF HEAD, INITIAL ENCOUNTER: ICD-10-CM

## 2024-10-23 PROBLEM — N39.0 URINARY TRACT INFECTION: Status: ACTIVE | Noted: 2024-10-23

## 2024-10-23 PROBLEM — E66.812 CLASS 2 SEVERE OBESITY DUE TO EXCESS CALORIES WITH SERIOUS COMORBIDITY AND BODY MASS INDEX (BMI) OF 37.0 TO 37.9 IN ADULT (HCC): Status: RESOLVED | Noted: 2024-07-23 | Resolved: 2024-10-23

## 2024-10-23 PROBLEM — E66.01 CLASS 2 SEVERE OBESITY DUE TO EXCESS CALORIES WITH SERIOUS COMORBIDITY AND BODY MASS INDEX (BMI) OF 37.0 TO 37.9 IN ADULT (HCC): Status: RESOLVED | Noted: 2024-07-23 | Resolved: 2024-10-23

## 2024-10-23 LAB
ALBUMIN SERPL BCG-MCNC: 4.3 G/DL (ref 3.5–5)
ALP SERPL-CCNC: 32 U/L (ref 34–104)
ALT SERPL W P-5'-P-CCNC: 30 U/L (ref 7–52)
ANION GAP SERPL CALCULATED.3IONS-SCNC: 9 MMOL/L (ref 4–13)
AST SERPL W P-5'-P-CCNC: 25 U/L (ref 13–39)
ATRIAL RATE: 70 BPM
BACTERIA UR QL AUTO: ABNORMAL /HPF
BASOPHILS # BLD AUTO: 0.06 THOUSANDS/ΜL (ref 0–0.1)
BASOPHILS NFR BLD AUTO: 1 % (ref 0–1)
BILIRUB SERPL-MCNC: 0.52 MG/DL (ref 0.2–1)
BILIRUB UR QL STRIP: NEGATIVE
BUN SERPL-MCNC: 17 MG/DL (ref 5–25)
CALCIUM SERPL-MCNC: 9.5 MG/DL (ref 8.4–10.2)
CHLORIDE SERPL-SCNC: 106 MMOL/L (ref 96–108)
CLARITY UR: ABNORMAL
CO2 SERPL-SCNC: 20 MMOL/L (ref 21–32)
COLOR UR: ABNORMAL
CREAT SERPL-MCNC: 1.53 MG/DL (ref 0.6–1.3)
EOSINOPHIL # BLD AUTO: 0.14 THOUSAND/ΜL (ref 0–0.61)
EOSINOPHIL NFR BLD AUTO: 2 % (ref 0–6)
ERYTHROCYTE [DISTWIDTH] IN BLOOD BY AUTOMATED COUNT: 12.9 % (ref 11.6–15.1)
GFR SERPL CREATININE-BSD FRML MDRD: 36 ML/MIN/1.73SQ M
GLUCOSE SERPL-MCNC: 104 MG/DL (ref 65–140)
GLUCOSE UR STRIP-MCNC: NEGATIVE MG/DL
HCT VFR BLD AUTO: 38.3 % (ref 34.8–46.1)
HGB BLD-MCNC: 12.2 G/DL (ref 11.5–15.4)
HGB UR QL STRIP.AUTO: ABNORMAL
HYALINE CASTS #/AREA URNS LPF: ABNORMAL /LPF
IMM GRANULOCYTES # BLD AUTO: 0.01 THOUSAND/UL (ref 0–0.2)
IMM GRANULOCYTES NFR BLD AUTO: 0 % (ref 0–2)
KETONES UR STRIP-MCNC: NEGATIVE MG/DL
LEUKOCYTE ESTERASE UR QL STRIP: ABNORMAL
LYMPHOCYTES # BLD AUTO: 2.39 THOUSANDS/ΜL (ref 0.6–4.47)
LYMPHOCYTES NFR BLD AUTO: 41 % (ref 14–44)
MAGNESIUM SERPL-MCNC: 1.9 MG/DL (ref 1.9–2.7)
MCH RBC QN AUTO: 30.6 PG (ref 26.8–34.3)
MCHC RBC AUTO-ENTMCNC: 31.9 G/DL (ref 31.4–37.4)
MCV RBC AUTO: 96 FL (ref 82–98)
MONOCYTES # BLD AUTO: 0.8 THOUSAND/ΜL (ref 0.17–1.22)
MONOCYTES NFR BLD AUTO: 14 % (ref 4–12)
MUCOUS THREADS UR QL AUTO: ABNORMAL
NEUTROPHILS # BLD AUTO: 2.47 THOUSANDS/ΜL (ref 1.85–7.62)
NEUTS SEG NFR BLD AUTO: 42 % (ref 43–75)
NITRITE UR QL STRIP: NEGATIVE
NON-SQ EPI CELLS URNS QL MICRO: ABNORMAL /HPF
NRBC BLD AUTO-RTO: 0 /100 WBCS
P AXIS: 20 DEGREES
PH UR STRIP.AUTO: 5.5 [PH]
PLATELET # BLD AUTO: 265 THOUSANDS/UL (ref 149–390)
PMV BLD AUTO: 9.5 FL (ref 8.9–12.7)
POTASSIUM SERPL-SCNC: 3.9 MMOL/L (ref 3.5–5.3)
PR INTERVAL: 130 MS
PROT SERPL-MCNC: 7.5 G/DL (ref 6.4–8.4)
PROT UR STRIP-MCNC: ABNORMAL MG/DL
QRS AXIS: 25 DEGREES
QRSD INTERVAL: 92 MS
QT INTERVAL: 416 MS
QTC INTERVAL: 449 MS
RBC # BLD AUTO: 3.99 MILLION/UL (ref 3.81–5.12)
RBC #/AREA URNS AUTO: ABNORMAL /HPF
SODIUM SERPL-SCNC: 135 MMOL/L (ref 135–147)
SP GR UR STRIP.AUTO: 1 (ref 1–1.03)
T WAVE AXIS: -3 DEGREES
UROBILINOGEN UR STRIP-ACNC: <2 MG/DL
VENTRICULAR RATE: 70 BPM
WBC # BLD AUTO: 5.87 THOUSAND/UL (ref 4.31–10.16)
WBC #/AREA URNS AUTO: ABNORMAL /HPF

## 2024-10-23 PROCEDURE — 81001 URINALYSIS AUTO W/SCOPE: CPT | Performed by: EMERGENCY MEDICINE

## 2024-10-23 PROCEDURE — 99284 EMERGENCY DEPT VISIT MOD MDM: CPT

## 2024-10-23 PROCEDURE — 80053 COMPREHEN METABOLIC PANEL: CPT | Performed by: EMERGENCY MEDICINE

## 2024-10-23 PROCEDURE — 83735 ASSAY OF MAGNESIUM: CPT | Performed by: EMERGENCY MEDICINE

## 2024-10-23 PROCEDURE — 70450 CT HEAD/BRAIN W/O DYE: CPT

## 2024-10-23 PROCEDURE — 96360 HYDRATION IV INFUSION INIT: CPT

## 2024-10-23 PROCEDURE — 87086 URINE CULTURE/COLONY COUNT: CPT | Performed by: EMERGENCY MEDICINE

## 2024-10-23 PROCEDURE — 36415 COLL VENOUS BLD VENIPUNCTURE: CPT | Performed by: EMERGENCY MEDICINE

## 2024-10-23 PROCEDURE — 99285 EMERGENCY DEPT VISIT HI MDM: CPT | Performed by: EMERGENCY MEDICINE

## 2024-10-23 PROCEDURE — 99223 1ST HOSP IP/OBS HIGH 75: CPT | Performed by: HOSPITALIST

## 2024-10-23 PROCEDURE — 96361 HYDRATE IV INFUSION ADD-ON: CPT

## 2024-10-23 PROCEDURE — 93005 ELECTROCARDIOGRAM TRACING: CPT

## 2024-10-23 PROCEDURE — 85025 COMPLETE CBC W/AUTO DIFF WBC: CPT | Performed by: EMERGENCY MEDICINE

## 2024-10-23 PROCEDURE — 93010 ELECTROCARDIOGRAM REPORT: CPT | Performed by: INTERNAL MEDICINE

## 2024-10-23 PROCEDURE — 99215 OFFICE O/P EST HI 40 MIN: CPT

## 2024-10-23 PROCEDURE — 72125 CT NECK SPINE W/O DYE: CPT

## 2024-10-23 RX ORDER — ACETAMINOPHEN 10 MG/ML
1000 INJECTION, SOLUTION INTRAVENOUS ONCE
Status: COMPLETED | OUTPATIENT
Start: 2024-10-23 | End: 2024-10-23

## 2024-10-23 RX ORDER — TIRZEPATIDE 5 MG/.5ML
5 INJECTION, SOLUTION SUBCUTANEOUS WEEKLY
Qty: 2 ML | Refills: 2 | Status: SHIPPED | OUTPATIENT
Start: 2024-10-23 | End: 2024-12-18

## 2024-10-23 RX ORDER — GABAPENTIN 100 MG/1
200 CAPSULE ORAL
Status: DISCONTINUED | OUTPATIENT
Start: 2024-10-23 | End: 2024-10-24 | Stop reason: HOSPADM

## 2024-10-23 RX ORDER — TAMSULOSIN HYDROCHLORIDE 0.4 MG/1
0.4 CAPSULE ORAL
Status: DISCONTINUED | OUTPATIENT
Start: 2024-10-23 | End: 2024-10-24 | Stop reason: HOSPADM

## 2024-10-23 RX ORDER — ALBUTEROL SULFATE 90 UG/1
2 INHALANT RESPIRATORY (INHALATION) EVERY 6 HOURS PRN
Status: DISCONTINUED | OUTPATIENT
Start: 2024-10-23 | End: 2024-10-24 | Stop reason: HOSPADM

## 2024-10-23 RX ORDER — OXYBUTYNIN CHLORIDE 5 MG/1
5 TABLET, EXTENDED RELEASE ORAL DAILY
Status: DISCONTINUED | OUTPATIENT
Start: 2024-10-23 | End: 2024-10-24 | Stop reason: HOSPADM

## 2024-10-23 RX ORDER — TRAMADOL HYDROCHLORIDE 50 MG/1
50 TABLET ORAL EVERY 6 HOURS PRN
Status: DISCONTINUED | OUTPATIENT
Start: 2024-10-23 | End: 2024-10-24 | Stop reason: HOSPADM

## 2024-10-23 RX ORDER — ACETAMINOPHEN 325 MG/1
975 TABLET ORAL EVERY 8 HOURS PRN
Status: DISCONTINUED | OUTPATIENT
Start: 2024-10-23 | End: 2024-10-24 | Stop reason: HOSPADM

## 2024-10-23 RX ORDER — SODIUM CHLORIDE 9 MG/ML
125 INJECTION, SOLUTION INTRAVENOUS CONTINUOUS
Status: DISCONTINUED | OUTPATIENT
Start: 2024-10-23 | End: 2024-10-24 | Stop reason: HOSPADM

## 2024-10-23 RX ORDER — CIPROFLOXACIN 500 MG/1
500 TABLET, FILM COATED ORAL 2 TIMES DAILY
Status: DISCONTINUED | OUTPATIENT
Start: 2024-10-23 | End: 2024-10-24 | Stop reason: HOSPADM

## 2024-10-23 RX ORDER — LANOLIN ALCOHOL/MO/W.PET/CERES
3 CREAM (GRAM) TOPICAL
Status: DISCONTINUED | OUTPATIENT
Start: 2024-10-23 | End: 2024-10-24 | Stop reason: HOSPADM

## 2024-10-23 RX ORDER — LORATADINE 10 MG/1
10 TABLET ORAL DAILY
Status: DISCONTINUED | OUTPATIENT
Start: 2024-10-23 | End: 2024-10-24 | Stop reason: HOSPADM

## 2024-10-23 RX ORDER — FAMOTIDINE 20 MG/1
10 TABLET, FILM COATED ORAL DAILY
Status: DISCONTINUED | OUTPATIENT
Start: 2024-10-23 | End: 2024-10-24 | Stop reason: HOSPADM

## 2024-10-23 RX ORDER — FLUTICASONE FUROATE AND VILANTEROL 100; 25 UG/1; UG/1
1 POWDER RESPIRATORY (INHALATION)
Status: DISCONTINUED | OUTPATIENT
Start: 2024-10-23 | End: 2024-10-24 | Stop reason: HOSPADM

## 2024-10-23 RX ORDER — SACCHAROMYCES BOULARDII 250 MG
250 CAPSULE ORAL 2 TIMES DAILY
Status: DISCONTINUED | OUTPATIENT
Start: 2024-10-23 | End: 2024-10-24 | Stop reason: HOSPADM

## 2024-10-23 RX ORDER — DULOXETIN HYDROCHLORIDE 60 MG/1
60 CAPSULE, DELAYED RELEASE ORAL DAILY
Status: DISCONTINUED | OUTPATIENT
Start: 2024-10-23 | End: 2024-10-24 | Stop reason: HOSPADM

## 2024-10-23 RX ORDER — DICYCLOMINE HYDROCHLORIDE 10 MG/1
10 CAPSULE ORAL 3 TIMES DAILY PRN
Status: DISCONTINUED | OUTPATIENT
Start: 2024-10-23 | End: 2024-10-24 | Stop reason: HOSPADM

## 2024-10-23 RX ADMIN — GABAPENTIN 200 MG: 100 CAPSULE ORAL at 21:28

## 2024-10-23 RX ADMIN — Medication 3 MG: at 21:28

## 2024-10-23 RX ADMIN — TRAMADOL HYDROCHLORIDE 50 MG: 50 TABLET, COATED ORAL at 16:58

## 2024-10-23 RX ADMIN — DULOXETINE HYDROCHLORIDE 60 MG: 60 CAPSULE, DELAYED RELEASE ORAL at 21:29

## 2024-10-23 RX ADMIN — ACETAMINOPHEN 975 MG: 325 TABLET ORAL at 20:05

## 2024-10-23 RX ADMIN — SODIUM CHLORIDE 125 ML/HR: 0.9 INJECTION, SOLUTION INTRAVENOUS at 12:09

## 2024-10-23 RX ADMIN — TAMSULOSIN HYDROCHLORIDE 0.4 MG: 0.4 CAPSULE ORAL at 16:42

## 2024-10-23 RX ADMIN — CIPROFLOXACIN 500 MG: 500 TABLET ORAL at 17:37

## 2024-10-23 RX ADMIN — SODIUM CHLORIDE 1000 ML: 0.9 INJECTION, SOLUTION INTRAVENOUS at 08:13

## 2024-10-23 RX ADMIN — Medication 3000 UNITS: at 21:29

## 2024-10-23 RX ADMIN — ACETAMINOPHEN 1000 MG: 10 INJECTION INTRAVENOUS at 10:54

## 2024-10-23 NOTE — CONSULTS
Consultation - Urology   Name: Maria Luz Ponce 59 y.o. female I MRN: [id number]  Unit/Bed#: ED-33 I Date of Admission: 10/23/2024   Date of Service: 10/23/2024 I Hospital Day: 0   Inpatient consult to Urology  Consult performed by: Keysha Cárdenas PA-C  Consult ordered by: Rafa Smith MD        Physician Requesting Evaluation: Myra Rick MD   Reason for Evaluation / Principal Problem: ureteral stent, ureteral stone    Assessment & Plan  Ureteral stent present  Underwent cystoscopy, retrograde pyelogram, insertion of left ureteral stent on 9/13/2024 due to low-grade temperature  -Scheduled to undergo definitive ureteroscopy this Friday at [facility] with Dr. Eller  -Admitted after syncopal episode  -Patient has ureteroscopy scheduled for Friday-if discharged can proceed with OR as planned.   COLT (acute kidney injury) (HCC)  Trend creatinine  She has a ureteral stent in place.  Denies any flank pain  Syncope  Admitted after syncopal episode  Management per primary  Hypertension  Hold ARB  Management per primary  Urinary tract infection  Preop urine culture E. Coli  On abx        Subjective:   HPI: Maria Luz is a 59-year-old female who presented after syncopal episode.  Patient is known to our practice she was seen on 9/13/2020 for for 4 mm left UPJ stone with mild hydronephrosis, low-grade temperature.  Patient underwent a ureteral stent placement at that time, discharged with plan for outpatient ureteroscopy.  This is scheduled this Friday on 10/25/2024 with Dr. Johns.  Patient was at work today and had a syncopal episode.  She reports she has been having more syncopal episodes however this time she hit her head.  In the ED she underwent a CT head and C-spine which was normal.  She was admitted to medicine for further management.  Urology was consulted due to ureteral stone with ureteral stent in place    Review of Systems   Constitutional:  Negative for chills and fever.   Respiratory:   Pt ID x 3. Notified pt of results per Melissa Neff. Negative for cough and shortness of breath.    Cardiovascular:  Negative for chest pain and palpitations.   Gastrointestinal:  Negative for abdominal pain and vomiting.   Genitourinary:  Positive for frequency. Negative for dysuria and hematuria.   Musculoskeletal:  Negative for arthralgias and back pain.   Skin:  Negative for color change and rash.   Neurological:  Negative for seizures and syncope.   All other systems reviewed and are negative.      Objective:    Vitals: Blood pressure 115/64, pulse 71, temperature 97.5 °F (36.4 °C), temperature source Oral, resp. rate 22, last menstrual period 03/15/2019, SpO2 97%.,There is no height or weight on file to calculate BMI.    Physical Exam  Vitals reviewed.   Constitutional:       General: She is not in acute distress.     Appearance: Normal appearance. She is normal weight. She is not ill-appearing, toxic-appearing or diaphoretic.   HENT:      Head: Normocephalic and atraumatic.      Right Ear: External ear normal.      Left Ear: External ear normal.      Nose: Nose normal.      Mouth/Throat:      Mouth: Mucous membranes are moist.   Eyes:      General: No scleral icterus.     Conjunctiva/sclera: Conjunctivae normal.   Cardiovascular:      Rate and Rhythm: Normal rate.      Pulses: Normal pulses.   Pulmonary:      Effort: Pulmonary effort is normal.   Neurological:      General: No focal deficit present.      Mental Status: She is alert and oriented to person, place, and time. Mental status is at baseline.   Psychiatric:         Mood and Affect: Mood normal.         Behavior: Behavior normal.         Thought Content: Thought content normal.         Judgment: Judgment normal.       Labs:  Recent Labs     10/23/24  0813   WBC 5.87       Recent Labs     10/23/24  0813   HGB 12.2     Recent Labs     10/23/24  0813   HCT 38.3     Recent Labs     10/23/24  0813   CREATININE 1.53*         History:    Past Medical History:   Diagnosis Date    Ankle fracture     Asthma      Cervical spinal stenosis     Closed left arm fracture, sequela     Concussion     Degeneration, intervertebral disc, cervical     Fibromyalgia, primary     Hypertension     Kidney stone     Migraine     Mixed hyperlipidemia 01/05/2021    Right arm fracture     Seasonal allergies     Shingles     Vitamin D deficiency      Social History     Socioeconomic History    Marital status: /Civil Union     Spouse name: None    Number of children: None    Years of education: None    Highest education level: None   Occupational History    None   Tobacco Use    Smoking status: Never    Smokeless tobacco: Never   Vaping Use    Vaping status: Never Used   Substance and Sexual Activity    Alcohol use: Not Currently     Comment: social    Drug use: No    Sexual activity: Yes     Partners: Male     Birth control/protection: Female Sterilization   Other Topics Concern    None   Social History Narrative    None     Social Determinants of Health     Financial Resource Strain: Low Risk  (2/20/2024)    [facility]    Overall Financial Resource Strain (CARDIA)     Difficulty of Paying Living Expenses: Not hard at all   Food Insecurity: No Food Insecurity (9/13/2024)    Nursing - Inadequate Food Risk Classification     Worried About Running Out of Food in the Last Year: Never true     Ran Out of Food in the Last Year: Never true     Ran Out of Food in the Last Year: Not on file   Transportation Needs: No Transportation Needs (9/13/2024)    PRAPARE - Transportation     Lack of Transportation (Medical): No     Lack of Transportation (Non-Medical): No   Physical Activity: Not on file   Stress: No Stress Concern Present (2/20/2024)    [facility] from [facility]    [employer] [place] [employer] - Occupational Stress Questionnaire     Feeling of Stress : Only a little   Social Connections: Unknown (6/18/2024)    Received  from [employer]    Social Connections     How often do you feel lonely or isolated from those around you? (Adult - for ages 18 years and over): Not on file   Intimate Partner Violence: Not At Risk (2/20/2024)    [facility]    Humiliation, Afraid, Rape, and Kick questionnaire     Fear of Current or Ex-Partner: No     Emotionally Abused: No     Physically Abused: No     Sexually Abused: No   Housing Stability: Low Risk  (9/13/2024)    Housing Stability Vital Sign     Unable to Pay for Housing in the Last Year: No     Number of Times Moved in the Last Year: 0     Homeless in the Last Year: No     Past Surgical History:   Procedure Laterality Date    CARPAL TUNNEL RELEASE Bilateral     CATARACT EXTRACTION Left     FL RETROGRADE PYELOGRAM  9/13/2024    MOUTH SURGERY      AR COLONOSCOPY FLX DX W/COLLJ SPEC WHEN PFRMD N/A 12/27/2018    Procedure: COLONOSCOPY;  Surgeon: Bita Montalvo MD;  [facility];  Service: Gastroenterology    AR CYSTO/URETERO W/LITHOTRIPSY &INDWELL STENT INSRT Left 9/13/2024    Procedure: CYSTOSCOPY URETEROSCOPY WITH RETROGRADE PYELOGRAM AND INSERTION STENT URETERAL;  Surgeon: Eder Johns MD;  Location[facility];  Service: Urology    SINUS SURGERY      TUBAL LIGATION       Family History   Problem Relation Age of Onset    Heart disease Father     No Known Problems Daughter     No Known Problems Daughter     No Known Problems Daughter     No Known Problems Maternal Grandmother     No Known Problems Maternal Grandfather     No Known Problems Paternal Grandmother     No Known Problems Paternal Grandfather     Uterine cancer Maternal Aunt 75    Lung cancer Paternal Aunt         age unknown       Keysha Cárdenas PA-C  Date: 10/23/2024 Time: 3:15 PM

## 2024-10-23 NOTE — H&P
H&P - Hospitalist   Name: Maria Luz Ponce 59 y.o. female I MRN: [id number]  Unit/Bed#: ED-33 I Date of Admission: 10/23/2024   Date of Service: 10/23/2024 I Hospital Day: 0     Assessment & Plan  Syncope  -The patient was standing and felt lightheaded.  She went to lean against a door and then fell stating she briefly passed out.  No other presyncopal or postsyncopal symptoms including chest pain, shortness of breath, nausea, vomiting, diaphoresis, palpitations.  Patient reports that she has had slightly decreased oral intake.   -CT brain: No acute intracranial abnormality.   -check orthostatic VS  -IVFs  Hypertension  -Hold ARB with COLT  COLT (acute kidney injury) (HCC)  -hold SGLT2, ARB, and Mobic (takes for fibromyalgia, states last dose was 10/18/24)  -IVFs  -trend Cr, if no improvement in 24 hours c/s renal  Ureteral stent present  -L ureteral stent placement which was scheduled to be removed October 25, 2024  -c/s uro as pt requesting for removal during this hospitalization  Urinary tract infection  -present on admission, continue home Cipro started 10/13/24      VTE Pharmacologic Prophylaxis: VTE Score: 2 Low Risk (Score 0-2) - Encourage Ambulation.  Code Status: FULL  Discussion with family: Updated  () at bedside.    Anticipated Length of Stay: Patient will be admitted on an observation basis with an anticipated length of stay of less than 2 midnights secondary to COLT, syncope.    History of Present Illness   Chief Complaint: syncope    Maria Luz Ponce is a 59 y.o. female who presents with a chief complaint of syncope.  This occurred this morning while the patient was standing. She felt lightheaded, went to lean against a door, and then fell stating she briefly passed out.  No other presyncopal or postsyncopal symptoms including chest pain, shortness of breath, nausea, vomiting, diaphoresis, palpitations.  Patient reports that she has recently had slightly decreased oral intake.   Recently the patient was in the ER on October 13, 2024.  She was given 10 days of ciprofloxacin for urinary tract infection and has been compliant with this.  She states that just yesterday her urinary frequency and dysuria has improved.  States she has chronic intermittent neck stiffness due to cervical stenosis.  She reports having a headache today.    Review of Systems   All other systems reviewed and are negative.      Historical Information   Past Medical History:   Diagnosis Date    Ankle fracture     Asthma     Cervical spinal stenosis     Closed left arm fracture, sequela     Concussion     Degeneration, intervertebral disc, cervical     Fibromyalgia, primary     Hypertension     Kidney stone     Migraine     Mixed hyperlipidemia 01/05/2021    Right arm fracture     Seasonal allergies     Shingles     Vitamin D deficiency      Past Surgical History:   Procedure Laterality Date    CARPAL TUNNEL RELEASE Bilateral     CATARACT EXTRACTION Left     FL RETROGRADE PYELOGRAM  9/13/2024    MOUTH SURGERY      AK COLONOSCOPY FLX DX W/COLLJ SPEC WHEN PFRMD N/A 12/27/2018    Procedure: COLONOSCOPY;  Surgeon: Bita Montalvo MD;  [facility];  Service: Gastroenterology    AK CYSTO/URETERO W/LITHOTRIPSY &INDWELL STENT INSRT Left 9/13/2024    Procedure: CYSTOSCOPY URETEROSCOPY WITH RETROGRADE PYELOGRAM AND INSERTION STENT URETERAL;  Surgeon: Eder Johns MD;  [facility];  Service: Urology    SINUS SURGERY      TUBAL LIGATION       Social History     Tobacco Use    Smoking status: Never    Smokeless tobacco: Never   Vaping Use    Vaping status: Never Used   Substance and Sexual Activity    Alcohol use: Not Currently     Comment: social    Drug use: No    Sexual activity: Yes     Partners: Male     Birth control/protection: Female Sterilization     E-Cigarette/Vaping    E-Cigarette Use Never User      E-Cigarette/Vaping Substances    Nicotine No     THC No     CBD No     Flavoring No     Other No      Unknown No      Family history non-contributory  Social History:  Marital Status: /Civil Union     Meds/Allergies   I have reviewed home medications with patient personally.  Prior to Admission medications    Medication Sig Start Date End Date Taking? Authorizing Provider   acetaminophen (TYLENOL) 500 mg tablet Take 1,000 mg by mouth every 6 (six) hours as needed for mild pain    Historical Provider, MD   albuterol (PROVENTIL HFA,VENTOLIN HFA) 90 mcg/act inhaler Inhale 2 puffs if needed 9/13/17   Historical Provider, MD   Ascorbic Acid (VITAMIN C) 100 MG tablet Take by mouth daily  Patient not taking: Reported on 10/17/2024    Historical Provider, MD   Cholecalciferol (VITAMIN D3) 3000 units TABS Take 3,000 Units by mouth daily    Historical Provider, MD   ciprofloxacin (CIPRO) 500 mg tablet Take 1 tablet (500 mg total) by mouth 2 (two) times a day for 10 days 10/13/24 10/23/24  Ana Maria Berry DO   Diclofenac Sodium (VOLTAREN) 1 % Apply 2 g topically if needed    Historical Provider, MD   dicyclomine (BENTYL) 10 mg capsule TAKE 1 CAPSULE (10 MG TOTAL) BY MOUTH 3 (THREE) TIMES A DAY AS NEEDED (DIARRHEA AND ABDOMINAL PAIN) 8/1/24 2/17/25  Bita Montalvo MD   DULoxetine (CYMBALTA) 60 mg delayed release capsule Take 60 mg by mouth daily 5/12/24   Historical Provider, MD   famotidine (PEPCID) 10 mg tablet Take 10 mg by mouth in the morning    Historical Provider, MD   fexofenadine (ALLEGRA) 180 MG tablet Take 180 mg by mouth as needed    Historical Provider, MD   fluticasone (FLONASE) 50 mcg/act nasal spray SPRAY 2 SPRAYS INTO EACH NOSTRIL 2 TIMES A DAY  Patient taking differently: 2 sprays into each nostril as needed 9/13/22   Shanda Crump PA-C   fluticasone-salmeterol (ADVAIR, WIXELA) 100-50 mcg/dose inhaler Inhale 1 puff 2 (two) times a day 12/22/20 10/17/24  Historical Provider, MD   gabapentin (NEURONTIN) 100 mg capsule Take 200 mg by mouth daily at bedtime 4/23/24   Historical Provider, MD    ipratropium (ATROVENT) 0.06 % nasal spray SPRAY 2 SPRAYS INTO EACH NOSTRIL 3 TIMES A DAY.  Patient taking differently: 1 spray into each nostril as needed 9/15/22   Rosa Chávez PA-C   melatonin 3 mg Take 3 mg by mouth daily at bedtime    Historical Provider, MD   meloxicam (MOBIC) 15 mg tablet Take 15 mg by mouth daily 8/14/18   Historical Provider, MD   methocarbamol (ROBAXIN) 750 mg tablet Take 750 mg by mouth if needed    Historical Provider, MD   [place]-3 1000 MG CAPS Take 1 capsule by mouth daily    Historical Provider, MD   oxybutynin (DITROPAN-XL) 5 mg 24 hr tablet Take 1 tablet (5 mg total) by mouth daily for 10 days 10/13/24 10/23/24  Ana Maria Berry DO   phenazopyridine (PYRIDIUM) 200 mg tablet Take 1 tablet (200 mg total) by mouth 3 (three) times a day  Patient not taking: Reported on 10/17/2024 10/13/24   Ana Maria Berry DO   Probiotic Product (PROBIOTIC BLEND PO) Take 1 capsule by mouth in the morning    Historical Provider, MD   sodium chloride (OCEAN) 0.65 % nasal spray 2 sprays into each nostril every 2 (two) hours while awake  Patient taking differently: 2 sprays into each nostril as needed 1/3/23 10/4/24  Zeb Greenberg MD   sulfamethoxazole-trimethoprim (BACTRIM DS) 800-160 mg per tablet Take 1 tablet by mouth 2 (two) times a day for 10 days 10/15/24 10/25/24  Sherry Dietrich PA-C   tamsulosin (FLOMAX) 0.4 mg Take 1 capsule (0.4 mg total) by mouth daily with dinner for 10 days 10/13/24 10/23/24  Ana Maria Berry DO   TART CHERRY PO Take 2 capsules by mouth 2 (two) times a day    Historical Provider, MD   tirzepatide (Zepbound) 5 mg/0.5 mL auto-injector Inject 0.5 mL (5 mg total) under the skin once a week  Patient taking differently: Inject 5 mg under the skin once a week Wednesday 9/18/24 11/13/24  Richele Pica, PA-C   traMADol (ULTRAM) 50 mg tablet Take 50 mg by mouth every 6 (six) hours as needed for moderate pain    Historical Provider, MD   valsartan (DIOVAN) 160 mg tablet Take  160 mg by mouth daily 9/10/24 9/10/25  Historical Provider, MD     Allergies   Allergen Reactions    Amoxicillin-Pot Clavulanate Hives    Omnipaque [Iohexol] Vomiting    Penicillins        Objective :  Temp:  [97.5 °F (36.4 °C)] 97.5 °F (36.4 °C)  HR:  [71] 71  BP: (115)/(64) 115/64  Resp:  [22] 22  SpO2:  [97 %] 97 %  O2 Device: None (Room air)    Physical Exam   Gen: NAD, AAOx3, well developed, well nourished  Eyes: EOMI, PERRLA, no scleral icterus  ENMT:  no nasal discharge, no otic discharge, moist mucous membranes  Neck:  Supple  Cardiovascular:  Regular rate and rhythm, normal S1-S2, no murmurs, rubs, or gallops  Lungs:  Clear to auscultation bilaterally, no wheezes, or rales, or rhonchi  Abdomen:  Positive bowel sounds, soft, nontender, nondistended  Skin:  Intact, no obvious lesions or rashes  Neuro: Cranial nerves 2-12 are intact, non-focal, moves all 4 extremities      Lines/Drains:            Lab Results: I have reviewed the following results:  Results from last 7 days   Lab Units 10/23/24  0813   WBC Thousand/uL 5.87   HEMOGLOBIN g/dL 12.2   HEMATOCRIT % 38.3   PLATELETS Thousands/uL 265   SEGS PCT % 42*   LYMPHO PCT % 41   MONO PCT % 14*   EOS PCT % 2     Results from last 7 days   Lab Units 10/23/24  0813   SODIUM mmol/L 135   POTASSIUM mmol/L 3.9   CHLORIDE mmol/L 106   CO2 mmol/L 20*   BUN mg/dL 17   CREATININE mg/dL 1.53*   ANION GAP mmol/L 9   CALCIUM mg/dL 9.5   ALBUMIN g/dL 4.3   TOTAL BILIRUBIN mg/dL 0.52   ALK PHOS U/L 32*   ALT U/L 30   AST U/L 25   GLUCOSE RANDOM mg/dL 104             Lab Results   Component Value Date    HGBA1C 6.1 (H) 03/16/2024    HGBA1C 6.2 (H) 09/12/2023    HGBA1C 6.0 (H) 02/25/2023           Imaging Results Review: I reviewed radiology reports from this admission including: CT head.  Other Study Results Review: EKG was personally reviewed and my interpretation is: NSR. HR 70..      ** Please Note: This note has been constructed using a voice recognition system. **

## 2024-10-23 NOTE — PLAN OF CARE
Problem: PAIN - ADULT  Goal: Verbalizes/displays adequate comfort level or baseline comfort level  Description: Interventions:  - Encourage patient to monitor pain and request assistance  - Assess pain using appropriate pain scale  - Administer analgesics based on type and severity of pain and evaluate response  - Implement non-pharmacological measures as appropriate and evaluate response  - Consider cultural and social influences on pain and pain management  - Notify physician/advanced practitioner if interventions unsuccessful or patient reports new pain  Outcome: Progressing     Problem: INFECTION - ADULT  Goal: Absence or prevention of progression during hospitalization  Description: INTERVENTIONS:  - Assess and monitor for signs and symptoms of infection  - Monitor lab/diagnostic results  - Monitor all insertion sites, i.e. indwelling lines, tubes, and drains  - Monitor endotracheal if appropriate and nasal secretions for changes in amount and color  - [place] appropriate cooling/warming therapies per order  - Administer medications as ordered  - Instruct and encourage patient and family to use good hand hygiene technique  - Identify and instruct in appropriate isolation precautions for identified infection/condition  Outcome: Progressing  Goal: Absence of fever/infection during neutropenic period  Description: INTERVENTIONS:  - Monitor WBC    Outcome: Progressing     Problem: SAFETY ADULT  Goal: Patient will remain free of falls  Description: INTERVENTIONS:  - Educate patient/family on patient safety including physical limitations  - Instruct patient to call for assistance with activity   - Consult OT/PT to assist with strengthening/mobility   - Keep Call bell within reach  - Keep bed low and locked with side rails adjusted as appropriate  - Keep care items and personal belongings within reach  - Initiate and maintain comfort rounds  - Make Fall Risk Sign visible to staff  - Offer Toileting every 2 Hours,  in advance of need  - Initiate/Maintain alarm  - Obtain necessary fall risk management equipment:   - Apply yellow socks and bracelet for high fall risk patients  - Consider moving patient to room near nurses station  Outcome: Progressing  Goal: Maintain or return to baseline ADL function  Description: INTERVENTIONS:  -  Assess patient's ability to carry out ADLs; assess patient's baseline for ADL function and identify physical deficits which impact ability to perform ADLs (bathing, care of mouth/teeth, toileting, grooming, dressing, etc.)  - Assess/evaluate cause of self-care deficits   - Assess range of motion  - Assess patient's mobility; develop plan if impaired  - Assess patient's need for assistive devices and provide as appropriate  - Encourage maximum independence but intervene and supervise when necessary  - Involve family in performance of ADLs  - Assess for home care needs following discharge   - Consider OT consult to assist with ADL evaluation and planning for discharge  - Provide patient education as appropriate  Outcome: Progressing  Goal: Maintains/Returns to pre admission functional level  Description: INTERVENTIONS:  - Perform AM-PAC 6 Click Basic Mobility/ Daily Activity assessment daily.  - Set and communicate daily mobility goal to care team and patient/family/caregiver.   - Collaborate with rehabilitation services on mobility goals if consulted  - Perform Range of Motion 2 times a day.  - Reposition patient every 2 hours.  - Dangle patient 2 times a day  - Stand patient 2 times a day  - Ambulate patient 2 times a day  - Out of bed to chair 2 times a day   - Out of bed for meals 2 times a day  - Out of bed for toileting  - Record patient progress and toleration of activity level   Outcome: Progressing     Problem: DISCHARGE PLANNING  Goal: Discharge to home or other facility with appropriate resources  Description: INTERVENTIONS:  - Identify barriers to discharge w/patient and caregiver  -  Arrange for needed discharge resources and transportation as appropriate  - Identify discharge learning needs (meds, wound care, etc.)  - Arrange for interpretive services to assist at discharge as needed  - Refer to Case Management Department for coordinating discharge planning if the patient needs post-hospital services based on physician/advanced practitioner order or complex needs related to functional status, cognitive ability, or social support system  Outcome: Progressing     Problem: Knowledge Deficit  Goal: Patient/family/caregiver demonstrates understanding of disease process, treatment plan, medications, and discharge instructions  Description: Complete learning assessment and assess knowledge base.  Interventions:  - Provide teaching at level of understanding  - Provide teaching via preferred learning methods  Outcome: Progressing     Problem: METABOLIC, FLUID AND ELECTROLYTES - ADULT  Goal: Electrolytes maintained within normal limits  Description: INTERVENTIONS:  - Monitor labs and assess patient for signs and symptoms of electrolyte imbalances  - Administer electrolyte replacement as ordered  - Monitor response to electrolyte replacements, including repeat lab results as appropriate  - Instruct patient on fluid and nutrition as appropriate  Outcome: Progressing  Goal: Fluid balance maintained  Description: INTERVENTIONS:  - Monitor labs   - Monitor I/O and WT  - Instruct patient on fluid and nutrition as appropriate  - Assess for signs & symptoms of volume excess or deficit  Outcome: Progressing  Goal: Glucose maintained within target range  Description: INTERVENTIONS:  - Monitor Blood Glucose as ordered  - Assess for signs and symptoms of hyperglycemia and hypoglycemia  - Administer ordered medications to maintain glucose within target range  - Assess nutritional intake and initiate nutrition service referral as needed  Outcome: Progressing

## 2024-10-23 NOTE — ASSESSMENT & PLAN NOTE
-hold SGLT2, ARB, and Mobic (takes for fibromyalgia, states last dose was 10/18/24)  -IVFs  -trend Cr, if no improvement in 24 hours c/s renal   Airway patent, Nasal mucosa clear. Mouth with normal mucosa.

## 2024-10-23 NOTE — ASSESSMENT & PLAN NOTE
-L ureteral stent placement which was scheduled to be removed October 25, 2024  -c/s uro as pt requesting for removal during this hospitalization

## 2024-10-23 NOTE — ASSESSMENT & PLAN NOTE
-The patient was standing and felt lightheaded.  She went to lean against a door and then fell stating she briefly passed out.  No other presyncopal or postsyncopal symptoms including chest pain, shortness of breath, nausea, vomiting, diaphoresis, palpitations.  Patient reports that she has had slightly decreased oral intake.   -CT brain: No acute intracranial abnormality.   -check orthostatic VS  -IVFs

## 2024-10-23 NOTE — H&P (VIEW-ONLY)
Consultation - Urology   Name: Maria Luz Ponce 59 y.o. female I MRN: [id number]  Unit/Bed#: ED-33 I Date of Admission: 10/23/2024   Date of Service: 10/23/2024 I Hospital Day: 0   Inpatient consult to Urology  Consult performed by: Keysha Cárdenas PA-C  Consult ordered by: Rafa Smith MD        Physician Requesting Evaluation: Myra Rick MD   Reason for Evaluation / Principal Problem: ureteral stent, ureteral stone    Assessment & Plan  Ureteral stent present  Underwent cystoscopy, retrograde pyelogram, insertion of left ureteral stent on 9/13/2024 due to low-grade temperature  -Scheduled to undergo definitive ureteroscopy this Friday at [facility] with Dr. Eller  -Admitted after syncopal episode  -Patient has ureteroscopy scheduled for Friday-if discharged can proceed with OR as planned.   COLT (acute kidney injury) (HCC)  Trend creatinine  She has a ureteral stent in place.  Denies any flank pain  Syncope  Admitted after syncopal episode  Management per primary  Hypertension  Hold ARB  Management per primary  Urinary tract infection  Preop urine culture E. Coli  On abx        Subjective:   HPI: Maria Luz is a 59-year-old female who presented after syncopal episode.  Patient is known to our practice she was seen on 9/13/2020 for for 4 mm left UPJ stone with mild hydronephrosis, low-grade temperature.  Patient underwent a ureteral stent placement at that time, discharged with plan for outpatient ureteroscopy.  This is scheduled this Friday on 10/25/2024 with Dr. Johns.  Patient was at work today and had a syncopal episode.  She reports she has been having more syncopal episodes however this time she hit her head.  In the ED she underwent a CT head and C-spine which was normal.  She was admitted to medicine for further management.  Urology was consulted due to ureteral stone with ureteral stent in place    Review of Systems   Constitutional:  Negative for chills and fever.   Respiratory:   Negative for cough and shortness of breath.    Cardiovascular:  Negative for chest pain and palpitations.   Gastrointestinal:  Negative for abdominal pain and vomiting.   Genitourinary:  Positive for frequency. Negative for dysuria and hematuria.   Musculoskeletal:  Negative for arthralgias and back pain.   Skin:  Negative for color change and rash.   Neurological:  Negative for seizures and syncope.   All other systems reviewed and are negative.      Objective:    Vitals: Blood pressure 115/64, pulse 71, temperature 97.5 °F (36.4 °C), temperature source Oral, resp. rate 22, last menstrual period 03/15/2019, SpO2 97%.,There is no height or weight on file to calculate BMI.    Physical Exam  Vitals reviewed.   Constitutional:       General: She is not in acute distress.     Appearance: Normal appearance. She is normal weight. She is not ill-appearing, toxic-appearing or diaphoretic.   HENT:      Head: Normocephalic and atraumatic.      Right Ear: External ear normal.      Left Ear: External ear normal.      Nose: Nose normal.      Mouth/Throat:      Mouth: Mucous membranes are moist.   Eyes:      General: No scleral icterus.     Conjunctiva/sclera: Conjunctivae normal.   Cardiovascular:      Rate and Rhythm: Normal rate.      Pulses: Normal pulses.   Pulmonary:      Effort: Pulmonary effort is normal.   Neurological:      General: No focal deficit present.      Mental Status: She is alert and oriented to person, place, and time. Mental status is at baseline.   Psychiatric:         Mood and Affect: Mood normal.         Behavior: Behavior normal.         Thought Content: Thought content normal.         Judgment: Judgment normal.       Labs:  Recent Labs     10/23/24  0813   WBC 5.87       Recent Labs     10/23/24  0813   HGB 12.2     Recent Labs     10/23/24  0813   HCT 38.3     Recent Labs     10/23/24  0813   CREATININE 1.53*         History:    Past Medical History:   Diagnosis Date    Ankle fracture     Asthma      Cervical spinal stenosis     Closed left arm fracture, sequela     Concussion     Degeneration, intervertebral disc, cervical     Fibromyalgia, primary     Hypertension     Kidney stone     Migraine     Mixed hyperlipidemia 01/05/2021    Right arm fracture     Seasonal allergies     Shingles     Vitamin D deficiency      Social History     Socioeconomic History    Marital status: /Civil Union     Spouse name: None    Number of children: None    Years of education: None    Highest education level: None   Occupational History    None   Tobacco Use    Smoking status: Never    Smokeless tobacco: Never   Vaping Use    Vaping status: Never Used   Substance and Sexual Activity    Alcohol use: Not Currently     Comment: social    Drug use: No    Sexual activity: Yes     Partners: Male     Birth control/protection: Female Sterilization   Other Topics Concern    None   Social History Narrative    None     Social Determinants of Health     Financial Resource Strain: Low Risk  (2/20/2024)    [facility]    Overall Financial Resource Strain (CARDIA)     Difficulty of Paying Living Expenses: Not hard at all   Food Insecurity: No Food Insecurity (9/13/2024)    Nursing - Inadequate Food Risk Classification     Worried About Running Out of Food in the Last Year: Never true     Ran Out of Food in the Last Year: Never true     Ran Out of Food in the Last Year: Not on file   Transportation Needs: No Transportation Needs (9/13/2024)    PRAPARE - Transportation     Lack of Transportation (Medical): No     Lack of Transportation (Non-Medical): No   Physical Activity: Not on file   Stress: No Stress Concern Present (2/20/2024)    [facility]    [employer] - Occupational Stress Questionnaire     Feeling of Stress : Only a little   Social Connections: Unknown (6/18/2024)    Received  from [employer]    Social Connections     How often do you feel lonely or isolated from those around you? (Adult - for ages 18 years and over): Not on file   Intimate Partner Violence: Not At Risk (2/20/2024)    [facility]    Humiliation, Afraid, Rape, and Kick questionnaire     Fear of Current or Ex-Partner: No     Emotionally Abused: No     Physically Abused: No     Sexually Abused: No   Housing Stability: Low Risk  (9/13/2024)    Housing Stability Vital Sign     Unable to Pay for Housing in the Last Year: No     Number of Times Moved in the Last Year: 0     Homeless in the Last Year: No     Past Surgical History:   Procedure Laterality Date    CARPAL TUNNEL RELEASE Bilateral     CATARACT EXTRACTION Left     FL RETROGRADE PYELOGRAM  9/13/2024    MOUTH SURGERY      [place] COLONOSCOPY FLX DX W/COLLJ SPEC WHEN PFRMD N/A 12/27/2018    Procedure: COLONOSCOPY;  Surgeon: Bita Montalvo MD;  Location: [facility];  Service: Gastroenterology    [place] CYSTO/URETERO W/LITHOTRIPSY &INDWELL STENT INSRT Left 9/13/2024    Procedure: CYSTOSCOPY URETEROSCOPY WITH RETROGRADE PYELOGRAM AND INSERTION STENT URETERAL;  Surgeon: Eder Johns MD;  Location: [facility];  Service: Urology    SINUS SURGERY      TUBAL LIGATION       Family History   Problem Relation Age of Onset    Heart disease Father     No Known Problems Daughter     No Known Problems Daughter     No Known Problems Daughter     No Known Problems Maternal Grandmother     No Known Problems Maternal Grandfather     No Known Problems Paternal Grandmother     No Known Problems Paternal Grandfather     Uterine cancer Maternal Aunt 75    Lung cancer Paternal Aunt         age unknown       Keysha Cárdenas PA-C  Date: 10/23/2024 Time: 3:15 PM

## 2024-10-23 NOTE — ASSESSMENT & PLAN NOTE
Underwent cystoscopy, retrograde pyelogram, insertion of left ureteral stent on 9/13/2024 due to low-grade temperature  -Scheduled to undergo definitive ureteroscopy this Friday at [facility] with Dr. Eller  -Admitted after syncopal episode  -Patient has ureteroscopy scheduled for Friday-if discharged can proceed with OR as planned.    no

## 2024-10-23 NOTE — ED PROVIDER NOTES
Time reflects when diagnosis was documented in both MDM as applicable and the Disposition within this note       Time User Action Codes Description Comment    10/23/2024  9:55 AM Valente Felipe Add [R55] Syncope     10/23/2024  9:56 AM Valente Felipe [S09.90XA] Traumatic injury of head, initial encounter     10/23/2024 10:41 AM Valente Felipe Add [N17.9] COLT (acute kidney injury) (HCC)     10/23/2024 10:41 AM Valente Felipe Modify [R55] Syncope     10/23/2024 10:41 AM Valente Felipe Modify [N17.9] COLT (acute kidney injury) (HCC)           ED Disposition       ED Disposition   Admit    Condition   Stable    Date/Time   Wed Oct 23, 2024 10:43 AM    Comment   Case was discussed with ONEYDA and the patient's admission status was agreed to be Admission Status: observation status to the service of Dr. Rick .               Assessment & Plan       Medical Decision Making  59-year-old female presenting to the emergency department status post orthostatic syncope in the setting of dehydration which is likely multifactorial in the setting of SGLT 2 in addition to recent pyelonephritis, nephrolithiasis.  CBC without acute pathology. NS provided. UA pending. CMP w/ COLT. EKG without cardiac etiology identified. No precending signs/sx to suggest primary cardiac mediated. W/ negative CT head/neck - will admit hospital medicine for COLT, resus, and Cr trending. Pt agrees to plan.         Amount and/or Complexity of Data Reviewed  Independent Historian: parent  External Data Reviewed: notes.  Labs: ordered.  Radiology: ordered.  ECG/medicine tests: ordered and independent interpretation performed.    Risk  OTC drugs.  Prescription drug management.  Decision regarding hospitalization.        ED Course as of 10/23/24 1046   Wed Oct 23, 2024   0821 EKG NSR without signs of ischemia, infarction, arrythmia. Nonspecific changes as compared to prior       Medications   acetaminophen (Ofirmev) injection 1,000 mg (has no administration in time range)    sodium chloride 0.9 % bolus 1,000 mL (0 mL Intravenous Stopped 10/23/24 1018)       ED Risk Strat Scores                                               History of Present Illness       Chief Complaint   Patient presents with    Syncope     Pt reports syncope, dizziness prior to episode. +head strike and neck pain. Denies thinners.aspirin. pts coworker reports syncope x3 this week.       Past Medical History:   Diagnosis Date    Ankle fracture     Asthma     Cervical spinal stenosis     Closed left arm fracture, sequela     Concussion     Degeneration, intervertebral disc, cervical     Fibromyalgia, primary     Hypertension     Kidney stone     Migraine     Mixed hyperlipidemia 01/05/2021    Right arm fracture     Seasonal allergies     Shingles     Vitamin D deficiency       Past Surgical History:   Procedure Laterality Date    CARPAL TUNNEL RELEASE Bilateral     CATARACT EXTRACTION Left     FL RETROGRADE PYELOGRAM  9/13/2024    MOUTH SURGERY      MA COLONOSCOPY FLX DX W/COLLJ SPEC WHEN PFRMD N/A 12/27/2018    Procedure: COLONOSCOPY;  Surgeon: Bita Montalvo MD;  Location: [facility];  Service: Gastroenterology    MA CYSTO/URETERO W/LITHOTRIPSY &INDWELL STENT INSRT Left 9/13/2024    Procedure: CYSTOSCOPY URETEROSCOPY WITH RETROGRADE PYELOGRAM AND INSERTION STENT URETERAL;  Surgeon: Eder Johns MD;  Location: [facility] OR;  Service: Urology    SINUS SURGERY      TUBAL LIGATION        Family History   Problem Relation Age of Onset    Heart disease Father     No Known Problems Daughter     No Known Problems Daughter     No Known Problems Daughter     No Known Problems Maternal Grandmother     No Known Problems Maternal Grandfather     No Known Problems Paternal Grandmother     No Known Problems Paternal Grandfather     Uterine cancer Maternal Aunt 75    Lung cancer Paternal Aunt         age unknown      Social History     Tobacco Use    Smoking status: Never    Smokeless tobacco: Never   Vaping Use    Vaping  status: Never Used   Substance Use Topics    Alcohol use: Not Currently     Comment: social    Drug use: No      E-Cigarette/Vaping    E-Cigarette Use Never User       E-Cigarette/Vaping Substances    Nicotine No     THC No     CBD No     Flavoring No     Other No     Unknown No       I have reviewed and agree with the history as documented.     59yoF, pmhx per chart including recent pyelo/stent 2/2 stone, presenting to ER s/p syncope. Pt notes occurred x2 yesterday and once this AM. Denies preceding SOB, CP. Notes orthostatic in nature. Begins to feel lightheaded. Did strike lt frontal aspect of head against down while going down.  Notes cervical spine pain and left frontal headache.      Syncope  Associated symptoms: headaches    Associated symptoms: no chest pain, no fever, no palpitations, no seizures, no shortness of breath and no vomiting        Review of Systems   Constitutional:  Negative for chills and fever.   HENT:  Negative for ear pain and sore throat.    Eyes:  Negative for pain and visual disturbance.   Respiratory:  Negative for cough and shortness of breath.    Cardiovascular:  Positive for syncope. Negative for chest pain and palpitations.   Gastrointestinal:  Negative for abdominal pain and vomiting.   Genitourinary:  Negative for dysuria and hematuria.   Musculoskeletal:  Positive for neck pain. Negative for arthralgias and back pain.   Skin:  Negative for color change and rash.   Neurological:  Positive for syncope, light-headedness and headaches. Negative for seizures.   All other systems reviewed and are negative.          Objective       ED Triage Vitals [10/23/24 0756]   Temperature Pulse Blood Pressure Respirations SpO2 Patient Position - Orthostatic VS   97.5 °F (36.4 °C) 71 115/64 22 97 % Lying      Temp Source Heart Rate Source BP Location FiO2 (%) Pain Score    Oral Monitor Right arm -- --      Vitals      Date and Time Temp Pulse SpO2 Resp BP Pain Score FACES Pain Rating User    10/23/24 0756 97.5 °F (36.4 °C) 71 97 % 22 115/64 -- -- TB            Physical Exam  Vitals and nursing note reviewed.   Constitutional:       General: She is not in acute distress.     Appearance: She is well-developed.      Comments: Head atraumatic, normocephalic. AAOX4, CN intact, moving all extremities purposefully. B/L tympanic membranes clear. Nares without septal hematoma and clear. No intraoral trauma. Jaw full ROM. 2+ Radial BUE and 2+ PT BLE. T, L spine without step off, tenderness, deformity.  C-spine without step-off or deformity but mild midline tenderness around C4-C5.  Lungs CTA. Clavicles intact. Chest, abd, pelvis without tenderness to palpation. Joints full ROM.      HENT:      Head: Normocephalic and atraumatic.   Eyes:      Conjunctiva/sclera: Conjunctivae normal.   Cardiovascular:      Rate and Rhythm: Normal rate and regular rhythm.      Heart sounds: No murmur heard.  Pulmonary:      Effort: Pulmonary effort is normal. No respiratory distress.      Breath sounds: Normal breath sounds.   Abdominal:      Palpations: Abdomen is soft.      Tenderness: There is no abdominal tenderness.   Musculoskeletal:         General: No swelling.      Cervical back: Neck supple.   Skin:     General: Skin is warm and dry.      Capillary Refill: Capillary refill takes less than 2 seconds.   Neurological:      Mental Status: She is alert.   Psychiatric:         Mood and Affect: Mood normal.         Results Reviewed       Procedure Component Value Units Date/Time    Urine Microscopic [[id number]]  (Abnormal) Collected: 10/23/24 1018    Lab Status: Final result Specimen: Urine, Clean Catch Updated: 10/23/24 1029     RBC, UA Innumerable /hpf      WBC, UA 10-20 /hpf      Epithelial Cells Occasional /hpf      Bacteria, UA Occasional /hpf      MUCUS THREADS Occasional     Hyaline Casts, UA 10-25 /lpf     Urine culture [[id number]] Collected: 10/23/24 1018    Lab Status: In process Specimen: Urine, Clean Catch Updated:  10/23/24 1029    UA w Reflex to Microscopic w Reflex to Culture [[id number]]  (Abnormal) Collected: 10/23/24 1018    Lab Status: Final result Specimen: Urine, Clean Catch Updated: 10/23/24 1025     Color, UA Light Yellow     Clarity, UA Turbid     Specific Gravity, UA 1.004     pH, UA 5.5     Leukocytes, UA Moderate     Nitrite, UA Negative     Protein, UA Trace mg/dl      Glucose, UA Negative mg/dl      Ketones, UA Negative mg/dl      Urobilinogen, UA <2.0 mg/dl      Bilirubin, UA Negative     Occult Blood, UA Large    Comprehensive metabolic panel [[id number]]  (Abnormal) Collected: 10/23/24 0813    Lab Status: Final result Specimen: Blood from Arm, Right Updated: 10/23/24 0838     Sodium 135 mmol/L      Potassium 3.9 mmol/L      Chloride 106 mmol/L      CO2 20 mmol/L      ANION GAP 9 mmol/L      BUN 17 mg/dL      Creatinine 1.53 mg/dL      Glucose 104 mg/dL      Calcium 9.5 mg/dL      AST 25 U/L      ALT 30 U/L      Alkaline Phosphatase 32 U/L      Total Protein 7.5 g/dL      Albumin 4.3 g/dL      Total Bilirubin 0.52 mg/dL      eGFR 36 ml/min/1.73sq m     Narrative:      National Kid[employer] guidelines for Chronic Kidney Disease (CKD):     Stage 1 with normal or high GFR (GFR > 90 mL/min/1.73 square meters)    Stage 2 Mild CKD (GFR = 60-89 mL/min/1.73 square meters)    Stage 3A Moderate CKD (GFR = 45-59 mL/min/1.73 square meters)    Stage 3B Moderate CKD (GFR = 30-44 mL/min/1.73 square meters)    Stage 4 Severe CKD (GFR = 15-29 mL/min/1.73 square meters)    Stage 5 End Stage CKD (GFR <15 mL/min/1.73 square meters)  Note: GFR calculation is accurate only with a steady state creatinine    Magnesium [[id number]]  (Normal) Collected: 10/23/24 0813    Lab Status: Final result Specimen: Blood from Arm, Right Updated: 10/23/24 0838     Magnesium 1.9 mg/dL     CBC and differential [[id number]]  (Abnormal) Collected: 10/23/24 0813    Lab Status: Final result Specimen: Blood from Arm, Right Updated: 10/23/24  0832     WBC 5.87 Thousand/uL      RBC 3.99 Million/uL      Hemoglobin 12.2 g/dL      Hematocrit 38.3 %      MCV 96 fL      MCH 30.6 pg      MCHC 31.9 g/dL      RDW 12.9 %      MPV 9.5 fL      Platelets 265 Thousands/uL      nRBC 0 /100 WBCs      Segmented % 42 %      Immature Grans % 0 %      Lymphocytes % 41 %      Monocytes % 14 %      Eosinophils Relative 2 %      Basophils Relative 1 %      Absolute Neutrophils 2.47 Thousands/µL      Absolute Immature Grans 0.01 Thousand/uL      Absolute Lymphocytes 2.39 Thousands/µL      Absolute Monocytes 0.80 Thousand/µL      Eosinophils Absolute 0.14 Thousand/µL      Basophils Absolute 0.06 Thousands/µL             CT head without contrast   Final Interpretation by Armando Hernandez MD (10/23 1013)      No acute intracranial abnormality.                  Workstation performed: [id number]         CT spine cervical without contrast   Final Interpretation by Armando Hernandez MD (10/23 1019)      No cervical spine fracture or traumatic malalignment.                  Workstation performed: [id number]             Procedures    ED Medication and Procedure Management   Prior to Admission Medications   Prescriptions Last Dose Informant Patient Reported? Taking?   Ascorbic Acid (VITAMIN C) 100 MG tablet  Self Yes No   Sig: Take by mouth daily   Patient not taking: Reported on 10/17/2024   Cholecalciferol (VITAMIN D3) 3000 units TABS  Self Yes No   Sig: Take 3,000 Units by mouth daily   DULoxetine (CYMBALTA) 60 mg delayed release capsule  Self Yes No   Sig: Take 60 mg by mouth daily   Diclofenac Sodium (VOLTAREN) 1 %  Self Yes No   Sig: Apply 2 g topically if needed   Omega-3 1000 MG CAPS  Self Yes No   Sig: Take 1 capsule by mouth daily   Probiotic Product (PROBIOTIC BLEND PO)   Yes No   Sig: Take 1 capsule by mouth in the morning   TART CHERRY PO   Yes No   Sig: Take 2 capsules by mouth 2 (two) times a day   acetaminophen (TYLENOL) 500 mg tablet   Yes No   Sig: Take 1,000 mg by  mouth every 6 (six) hours as needed for mild pain   albuterol (PROVENTIL HFA,VENTOLIN HFA) 90 mcg/act inhaler  Self Yes No   Sig: Inhale 2 puffs if needed   ciprofloxacin (CIPRO) 500 mg tablet   No No   Sig: Take 1 tablet (500 mg total) by mouth 2 (two) times a day for 10 days   dicyclomine (BENTYL) 10 mg capsule  Self No No   Sig: TAKE 1 CAPSULE (10 MG TOTAL) BY MOUTH 3 (THREE) TIMES A DAY AS NEEDED (DIARRHEA AND ABDOMINAL PAIN)   famotidine (PEPCID) 10 mg tablet  Self Yes No   Sig: Take 10 mg by mouth in the morning   fexofenadine (ALLEGRA) 180 MG tablet  Self Yes No   Sig: Take 180 mg by mouth as needed   fluticasone (FLONASE) 50 mcg/act nasal spray  Self No No   Sig: SPRAY 2 SPRAYS INTO EACH NOSTRIL 2 TIMES A DAY   Patient taking differently: 2 sprays into each nostril as needed   fluticasone-salmeterol (ADVAIR, WIXELA) 100-50 mcg/dose inhaler  Self Yes No   Sig: Inhale 1 puff 2 (two) times a day   gabapentin (NEURONTIN) 100 mg capsule  Self Yes No   Sig: Take 200 mg by mouth daily at bedtime   ipratropium (ATROVENT) 0.06 % nasal spray  Self No No   Sig: SPRAY 2 SPRAYS INTO EACH NOSTRIL 3 TIMES A DAY.   Patient taking differently: 1 spray into each nostril as needed   melatonin 3 mg  Self Yes No   Sig: Take 3 mg by mouth daily at bedtime   meloxicam (MOBIC) 15 mg tablet  Self Yes No   Sig: Take 15 mg by mouth daily   methocarbamol (ROBAXIN) 750 mg tablet  Self Yes No   Sig: Take 750 mg by mouth if needed   oxybutynin (DITROPAN-XL) 5 mg 24 hr tablet   No No   Sig: Take 1 tablet (5 mg total) by mouth daily for 10 days   phenazopyridine (PYRIDIUM) 200 mg tablet   No No   Sig: Take 1 tablet (200 mg total) by mouth 3 (three) times a day   Patient not taking: Reported on 10/17/2024   sodium chloride (OCEAN) 0.65 % nasal spray  Self No No   Si sprays into each nostril every 2 (two) hours while awake   Patient taking differently: 2 sprays into each nostril as needed   sulfamethoxazole-trimethoprim (BACTRIM DS)  800-160 mg per tablet   No No   Sig: Take 1 tablet by mouth 2 (two) times a day for 10 days   tamsulosin (FLOMAX) 0.4 mg   No No   Sig: Take 1 capsule (0.4 mg total) by mouth daily with dinner for 10 days   tirzepatide (Zepbound) 5 mg/0.5 mL auto-injector  Self No No   Sig: Inject 0.5 mL (5 mg total) under the skin once a week   Patient taking differently: Inject 5 mg under the skin once a week Wednesday   traMADol (ULTRAM) 50 mg tablet   Yes No   Sig: Take 50 mg by mouth every 6 (six) hours as needed for moderate pain   valsartan (DIOVAN) 160 mg tablet  Self Yes No   Sig: Take 160 mg by mouth daily      Facility-Administered Medications: None     Patient's Medications   Discharge Prescriptions    No medications on file     No discharge procedures on file.  ED SEPSIS DOCUMENTATION   Time reflects when diagnosis was documented in both MDM as applicable and the Disposition within this note       Time User Action Codes Description Comment    10/23/2024  9:55 AM Valente Felipe [R55] Syncope     10/23/2024  9:56 AM Valente Felipe [S09.90XA] Traumatic injury of head, initial encounter     10/23/2024 10:41 AM Valente Felipe Add [N17.9] COLT (acute kidney injury) ([facility])     10/23/2024 10:41 AM Valente Felipe Modify [R55] Syncope     10/23/2024 10:41 AM Valente Felipe Modify [N17.9] COLT (acute kidney injury) ([facility])                  Valente Felipe DO  10/23/24 1046

## 2024-10-24 VITALS
RESPIRATION RATE: 20 BRPM | HEART RATE: 71 BPM | DIASTOLIC BLOOD PRESSURE: 55 MMHG | TEMPERATURE: 97.8 F | OXYGEN SATURATION: 96 % | SYSTOLIC BLOOD PRESSURE: 98 MMHG

## 2024-10-24 PROBLEM — N17.9 AKI (ACUTE KIDNEY INJURY) (HCC): Status: RESOLVED | Noted: 2024-10-23 | Resolved: 2024-10-24

## 2024-10-24 LAB
ANION GAP SERPL CALCULATED.3IONS-SCNC: 5 MMOL/L (ref 4–13)
BACTERIA UR CULT: NORMAL
BUN SERPL-MCNC: 13 MG/DL (ref 5–25)
CALCIUM SERPL-MCNC: 8.5 MG/DL (ref 8.4–10.2)
CHLORIDE SERPL-SCNC: 111 MMOL/L (ref 96–108)
CO2 SERPL-SCNC: 22 MMOL/L (ref 21–32)
CREAT SERPL-MCNC: 1.05 MG/DL (ref 0.6–1.3)
ERYTHROCYTE [DISTWIDTH] IN BLOOD BY AUTOMATED COUNT: 13 % (ref 11.6–15.1)
GFR SERPL CREATININE-BSD FRML MDRD: 58 ML/MIN/1.73SQ M
GLUCOSE P FAST SERPL-MCNC: 90 MG/DL (ref 65–99)
GLUCOSE SERPL-MCNC: 90 MG/DL (ref 65–140)
HCT VFR BLD AUTO: 34.5 % (ref 34.8–46.1)
HGB BLD-MCNC: 11.1 G/DL (ref 11.5–15.4)
MCH RBC QN AUTO: 30.4 PG (ref 26.8–34.3)
MCHC RBC AUTO-ENTMCNC: 32.2 G/DL (ref 31.4–37.4)
MCV RBC AUTO: 95 FL (ref 82–98)
PLATELET # BLD AUTO: 222 THOUSANDS/UL (ref 149–390)
PMV BLD AUTO: 9.4 FL (ref 8.9–12.7)
POTASSIUM SERPL-SCNC: 4.2 MMOL/L (ref 3.5–5.3)
RBC # BLD AUTO: 3.65 MILLION/UL (ref 3.81–5.12)
SODIUM SERPL-SCNC: 138 MMOL/L (ref 135–147)
WBC # BLD AUTO: 6.27 THOUSAND/UL (ref 4.31–10.16)

## 2024-10-24 PROCEDURE — 85027 COMPLETE CBC AUTOMATED: CPT | Performed by: HOSPITALIST

## 2024-10-24 PROCEDURE — 99239 HOSP IP/OBS DSCHRG MGMT >30: CPT

## 2024-10-24 PROCEDURE — 80048 BASIC METABOLIC PNL TOTAL CA: CPT | Performed by: HOSPITALIST

## 2024-10-24 RX ADMIN — SODIUM CHLORIDE 125 ML/HR: 0.9 INJECTION, SOLUTION INTRAVENOUS at 08:55

## 2024-10-24 RX ADMIN — ACETAMINOPHEN 975 MG: 325 TABLET ORAL at 08:42

## 2024-10-24 RX ADMIN — LORATADINE 10 MG: 10 TABLET ORAL at 08:42

## 2024-10-24 RX ADMIN — OXYBUTYNIN CHLORIDE 5 MG: 5 TABLET, EXTENDED RELEASE ORAL at 08:42

## 2024-10-24 RX ADMIN — DULOXETINE HYDROCHLORIDE 60 MG: 60 CAPSULE, DELAYED RELEASE ORAL at 08:42

## 2024-10-24 RX ADMIN — FAMOTIDINE 10 MG: 20 TABLET, FILM COATED ORAL at 08:42

## 2024-10-24 RX ADMIN — FLUTICASONE FUROATE AND VILANTEROL TRIFENATATE 1 PUFF: 100; 25 POWDER RESPIRATORY (INHALATION) at 08:56

## 2024-10-24 RX ADMIN — TRAMADOL HYDROCHLORIDE 50 MG: 50 TABLET, COATED ORAL at 00:20

## 2024-10-24 RX ADMIN — Medication 3000 UNITS: at 08:42

## 2024-10-24 RX ADMIN — Medication 250 MG: at 08:41

## 2024-10-24 RX ADMIN — CIPROFLOXACIN 500 MG: 500 TABLET ORAL at 08:42

## 2024-10-24 NOTE — PLAN OF CARE
Problem: PAIN - ADULT  Goal: Verbalizes/displays adequate comfort level or baseline comfort level  Description: Interventions:  - Encourage patient to monitor pain and request assistance  - Assess pain using appropriate pain scale  - Administer analgesics based on type and severity of pain and evaluate response  - Implement non-pharmacological measures as appropriate and evaluate response  - Consider cultural and social influences on pain and pain management  - Notify physician/advanced practitioner if interventions unsuccessful or patient reports new pain  Outcome: Progressing     Problem: INFECTION - ADULT  Goal: Absence or prevention of progression during hospitalization  Description: INTERVENTIONS:  - Assess and monitor for signs and symptoms of infection  - Monitor lab/diagnostic results  - Monitor all insertion sites, i.e. indwelling lines, tubes, and drains  - Monitor endotracheal if appropriate and nasal secretions for changes in amount and color  - Continental appropriate cooling/warming therapies per order  - Administer medications as ordered  - Instruct and encourage patient and family to use good hand hygiene technique  - Identify and instruct in appropriate isolation precautions for identified infection/condition  Outcome: Progressing  Goal: Absence of fever/infection during neutropenic period  Description: INTERVENTIONS:  - Monitor WBC    Outcome: Progressing     Problem: SAFETY ADULT  Goal: Patient will remain free of falls  Description: INTERVENTIONS:  - Educate patient/family on patient safety including physical limitations  - Instruct patient to call for assistance with activity   - Consult OT/PT to assist with strengthening/mobility   - Keep Call bell within reach  - Keep bed low and locked with side rails adjusted as appropriate  - Keep care items and personal belongings within reach  - Initiate and maintain comfort rounds  - Make Fall Risk Sign visible to staff  - Offer Toileting every 2 Hours,  in advance of need  - Initiate/Maintain alarm  - Obtain necessary fall risk management equipment:   - Apply yellow socks and bracelet for high fall risk patients  - Consider moving patient to room near nurses station  Outcome: Progressing  Goal: Maintain or return to baseline ADL function  Description: INTERVENTIONS:  -  Assess patient's ability to carry out ADLs; assess patient's baseline for ADL function and identify physical deficits which impact ability to perform ADLs (bathing, care of mouth/teeth, toileting, grooming, dressing, etc.)  - Assess/evaluate cause of self-care deficits   - Assess range of motion  - Assess patient's mobility; develop plan if impaired  - Assess patient's need for assistive devices and provide as appropriate  - Encourage maximum independence but intervene and supervise when necessary  - Involve family in performance of ADLs  - Assess for home care needs following discharge   - Consider OT consult to assist with ADL evaluation and planning for discharge  - Provide patient education as appropriate  Outcome: Progressing  Goal: Maintains/Returns to pre admission functional level  Description: INTERVENTIONS:  - Perform AM-PAC 6 Click Basic Mobility/ Daily Activity assessment daily.  - Set and communicate daily mobility goal to care team and patient/family/caregiver.   - Collaborate with rehabilitation services on mobility goals if consulted  - Perform Range of Motion 2 times a day.  - Reposition patient every 2 hours.  - Dangle patient 2 times a day  - Stand patient 2 times a day  - Ambulate patient 2 times a day  - Out of bed to chair 2 times a day   - Out of bed for meals 2 times a day  - Out of bed for toileting  - Record patient progress and toleration of activity level   Outcome: Progressing     Problem: DISCHARGE PLANNING  Goal: Discharge to home or other facility with appropriate resources  Description: INTERVENTIONS:  - Identify barriers to discharge w/patient and caregiver  -  Arrange for needed discharge resources and transportation as appropriate  - Identify discharge learning needs (meds, wound care, etc.)  - Arrange for interpretive services to assist at discharge as needed  - Refer to Case Management Department for coordinating discharge planning if the patient needs post-hospital services based on physician/advanced practitioner order or complex needs related to functional status, cognitive ability, or social support system  Outcome: Progressing     Problem: Knowledge Deficit  Goal: Patient/family/caregiver demonstrates understanding of disease process, treatment plan, medications, and discharge instructions  Description: Complete learning assessment and assess knowledge base.  Interventions:  - Provide teaching at level of understanding  - Provide teaching via preferred learning methods  Outcome: Progressing     Problem: METABOLIC, FLUID AND ELECTROLYTES - ADULT  Goal: Electrolytes maintained within normal limits  Description: INTERVENTIONS:  - Monitor labs and assess patient for signs and symptoms of electrolyte imbalances  - Administer electrolyte replacement as ordered  - Monitor response to electrolyte replacements, including repeat lab results as appropriate  - Instruct patient on fluid and nutrition as appropriate  Outcome: Progressing  Goal: Fluid balance maintained  Description: INTERVENTIONS:  - Monitor labs   - Monitor I/O and WT  - Instruct patient on fluid and nutrition as appropriate  - Assess for signs & symptoms of volume excess or deficit  Outcome: Progressing  Goal: Glucose maintained within target range  Description: INTERVENTIONS:  - Monitor Blood Glucose as ordered  - Assess for signs and symptoms of hyperglycemia and hypoglycemia  - Administer ordered medications to maintain glucose within target range  - Assess nutritional intake and initiate nutrition service referral as needed  Outcome: Progressing

## 2024-10-24 NOTE — DISCHARGE SUMMARY
Discharge Summary - Hospitalist   Name: Maria Luz Ponce 59 y.o. female I MRN: [id number]  Unit/Bed#: S -01 I Date of Admission: 10/23/2024   Date of Service: 10/24/2024 I Hospital Day: 0     Assessment & Plan  Syncope  The patient was standing and felt lightheaded. She went to lean against a door and then fell stating she briefly passed out.  No other presyncopal or postsyncopal symptoms including chest pain, shortness of breath, nausea, vomiting, diaphoresis, palpitations.  Patient reports that she has had slightly decreased oral intake.   CT head: No acute intracranial abnormality.   CT spine: No cervical spine fracture or traumatic malalignment.   EKG NSR with no ST/T changes  S/p IVF resuscitation during admission  Orthostatic BP lying 114/68 HR 74; sitting 102/60 HR 74; standing 112/64 HR 82   Cannot rule out orthostasis as patient did not have orthostatic BPs on admission; however, negative for orthostatic hypotension today. She tells me that she has still been going to work and been having poor fluid intake for the past 10 days as she has had started antibiotics again for UTI. Syncope most likely related to mild dehydration related to recent UTI and poor fluid intake.  Hypertension  Pta ARB secondary to COLT, restart upon discharge as COLT resolved.  COLT (acute kidney injury) (HCC) (Resolved: 10/24/2024)  Hold SGLT2, ARB, and Mobic (takes for fibromyalgia, states last dose was 10/18/24)  S/p IVF resuscitation during admission  Creatinine greatly improved, 1.53 on admission; now 1.05 which appears to be patients baseline.  Ureteral stent present  L ureteral stent placement which was scheduled to be removed October 25, 2024  Seen in consultation by urology plan to proceed with planned cysto tomorrow.  Urinary tract infection  present on admission, continue home Cipro started 10/13/24     Medical Problems       Resolved Problems  Date Reviewed: 10/23/2024   None       Discharging Physician / Practitioner:  PRAVEEN Lyman  PCP: Nohelia Whitman MD  Admission Date:   Admission Orders (From admission, onward)       Ordered        10/23/24 1046  Place in Observation  Once                          Discharge Date: 10/24/24    Consultations During Hospital Stay:  Urology    Procedures Performed:   None    Significant Findings / Test Results:   CT head: No acute intracranial abnormality.   CT spine: No cervical spine fracture or traumatic malalignment.     Incidental Findings:   None     Test Results Pending at Discharge (will require follow up):   None     Outpatient Tests Requested:  None    Complications:  No    Reason for Admission: syncope    Hospital Course:   Maria Luz Ponce is a pleasant 59 y.o. female patient with a PMHx of HTN, recurrent UTI, ureteral stenting, hyperlipidemia, GERD, and asthma who originally presented to the hospital on 10/23/2024 due to syncope. The patient was standing and felt lightheaded. She went to lean against a door and then fell stating she briefly passed out.  No other presyncopal or postsyncopal symptoms including chest pain, shortness of breath, nausea, vomiting, diaphoresis, palpitations. ED workup with CT head, CT spine and EKG unremarkable. Patient had COLT with creatinine 1.53 on admission for which she received IV hydration. Now stable at 1.05 which appears to be patients baseline. Cannot rule out orthostasis as patient did not have orthostatic BPs on admission; however, negative for orthostatic hypotension today. She tells me that she has still been going to work and been having poor fluid intake for the past 10 days as she has had started antibiotics again for UTI. Syncope most likely related to mild dehydration related to recent UTI and poor fluid intake.     The patient, initially admitted to the hospital as inpatient, was discharged earlier than expected given the following: Complete resolution of symptoms .  Please see above list of diagnoses and related plan for  additional information.     Condition at Discharge: fair    Discharge Day Visit / Exam:   Subjective:  Patient seen and examined at bedside with no new complaints. She denies any chest pain, shortness of breath, nausea, vomiting, diaphoresis, palpitations. Patient states she has had complete resolution of symptoms since she came in yesterday. Good appetite. She would like to go home today.     Vitals: Blood Pressure: 112/64 (10/24/24 0852)  Pulse: 71 (10/24/24 0852)  Temperature: 98.2 °F (36.8 °C) (10/24/24 0741)  Temp Source: Oral (10/23/24 1733)  Respirations: 20 (10/23/24 1641)  SpO2: 94 % (10/24/24 0852)  Physical Exam  Vitals reviewed.   Constitutional:       General: She is not in acute distress.     Appearance: Normal appearance. She is well-developed and normal weight. She is not ill-appearing or toxic-appearing.   HENT:      Head: Normocephalic and atraumatic.      Mouth/Throat:      Mouth: Mucous membranes are moist.      Pharynx: Oropharynx is clear.   Eyes:      Conjunctiva/sclera: Conjunctivae normal.      Pupils: Pupils are equal, round, and reactive to light.   Cardiovascular:      Rate and Rhythm: Normal rate and regular rhythm.      Pulses: Normal pulses.           Radial pulses are 2+ on the right side and 2+ on the left side.        Dorsalis pedis pulses are 2+ on the right side and 2+ on the left side.      Heart sounds: Normal heart sounds. No murmur heard.  Pulmonary:      Effort: Pulmonary effort is normal. No respiratory distress.      Breath sounds: Normal breath sounds. No wheezing, rhonchi or rales.   Abdominal:      General: Bowel sounds are normal.      Palpations: Abdomen is soft.      Tenderness: There is no abdominal tenderness. There is no guarding.   Musculoskeletal:         General: No swelling.      Cervical back: Neck supple.      Right lower leg: No edema.      Left lower leg: No edema.   Skin:     General: Skin is warm and dry.      Capillary Refill: Capillary refill takes less  than 2 seconds.   Neurological:      General: No focal deficit present.      Mental Status: She is alert and oriented to person, place, and time. Mental status is at baseline.      Sensory: No sensory deficit.      Motor: No weakness.   Psychiatric:         Mood and Affect: Mood normal.       Discussion with Family: Patient declined call to .     Discharge instructions/Information to patient and family:   See after visit summary for information provided to patient and family.      Provisions for Follow-Up Care:  See after visit summary for information related to follow-up care and any pertinent home health orders.      Mobility at time of Discharge:   Basic Mobility Inpatient Raw Score: 24  JH-HLM Goal: 8: Walk 250 feet or more  JH-HLM Achieved: 7: Walk 25 feet or more  HLM Goal achieved. Continue to encourage appropriate mobility.     Disposition:   Home    Planned Readmission: No    Discharge Medications:  See after visit summary for reconciled discharge medications provided to patient and/or family.      Administrative Statements   Discharge Statement:  I have spent a total time of 45 minutes in caring for this patient on the day of the visit/encounter. >30 minutes of time was spent on: Diagnostic results, Risks and benefits of tx options, Importance of tx compliance, Risk factor reductions, Impressions, Counseling / Coordination of care, Documenting in the medical record, and Reviewing / ordering tests, medicine, procedures  .    **Please Note: This note may have been constructed using a voice recognition system**

## 2024-10-24 NOTE — ASSESSMENT & PLAN NOTE
L ureteral stent placement which was scheduled to be removed October 25, 2024  Seen in consultation by urology plan to proceed with planned cysto tomorrow.

## 2024-10-24 NOTE — ASSESSMENT & PLAN NOTE
-The patient was standing and felt lightheaded.  She went to lean against a door and then fell stating she briefly passed out.  No other presyncopal or postsyncopal symptoms including chest pain, shortness of breath, nausea, vomiting, diaphoresis, palpitations.  Patient reports that she has had slightly decreased oral intake.   -CT brain: No acute intracranial abnormality.   -IVFs  -orthostatic BP lying 114/68 HR 74; sitting 102/60 HR 74; standing 112/64 HR 82  -

## 2024-10-24 NOTE — UTILIZATION REVIEW
Initial Clinical Review    Admission: Date/Time/Statement:   Admission Orders (From admission, onward)       Ordered        10/23/24 1046  Place in Observation  Once                          Orders Placed This Encounter   Procedures    Place in Observation     Standing Status:   Standing     Number of Occurrences:   1     Order Specific Question:   Level of Care     Answer:   Med Surg [16]     ED Arrival Information       Expected   -    Arrival   10/23/2024 07:47    Acuity   Urgent              Means of arrival   Wheelchair    Escorted by   Friend    Service   Hospitalist    Admission type   Emergency              Arrival complaint   WC/Fall/-thinners/head strike/neck pain             Chief Complaint   Patient presents with    Syncope     Pt reports syncope, dizziness prior to episode. +head strike and neck pain. Denies thinners.aspirin. pts coworker reports syncope x3 this week.       Initial Presentation: 59 y.o. female who presents with a chief complaint of syncope. This occurred this morning while the patient was standing. She felt lightheaded, went to lean against a door, and then fell stating she briefly passed out. No other presyncopal or postsyncopal symptoms including chest pain, shortness of breath, nausea, vomiting, diaphoresis, palpitations. Patient reports that she has recently had slightly decreased oral intake. Recently the patient was in the ER on October 13. She was given 10 days of ciprofloxacin for urinary tract infection and has been compliant with this. She states that just yesterday her urinary frequency and dysuria has improved. States she has chronic intermittent neck stiffness due to cervical stenosis. She reports having a headache today. Plan: Observation for syncope, HTN, COLT, ureteral stent, UTI: orthostatic vitals, IV fluids, hold ARB, IV fluids, trend creatinine, continue  Cipro.     ED Treatment-Medication Administration from 10/23/2024 0747 to 10/23/2024 1718         Date/Time Order  Dose Route Action     10/23/2024 0813 sodium chloride 0.9 % bolus 1,000 mL 1,000 mL Intravenous New Bag     10/23/2024 1054 acetaminophen (Ofirmev) injection 1,000 mg 1,000 mg Intravenous New Bag     10/23/2024 1642 tamsulosin (FLOMAX) capsule 0.4 mg 0.4 mg Oral Given     10/23/2024 1658 traMADol (ULTRAM) tablet 50 mg 50 mg Oral Given     10/23/2024 1209 sodium chloride 0.9 % infusion 125 mL/hr Intravenous New Bag            Scheduled Medications:  Cholecalciferol, 3,000 Units, Oral, Daily  ciprofloxacin, 500 mg, Oral, BID  DULoxetine, 60 mg, Oral, Daily  famotidine, 10 mg, Oral, Daily  Fluticasone Furoate-Vilanterol, 1 puff, Inhalation, Daily  gabapentin, 200 mg, Oral, HS  loratadine, 10 mg, Oral, Daily  melatonin, 3 mg, Oral, HS  oxybutynin, 5 mg, Oral, Daily  saccharomyces boulardii, 250 mg, Oral, BID  tamsulosin, 0.4 mg, Oral, Daily With Dinner      Continuous IV Infusions:  sodium chloride, 125 mL/hr, Intravenous, Continuous      PRN Meds:  acetaminophen, 975 mg, Oral, Q8H PRN  albuterol, 2 puff, Inhalation, Q6H PRN  dicyclomine, 10 mg, Oral, TID PRN  traMADol, 50 mg, Oral, Q6H PRN      ED Triage Vitals   Temperature Pulse Respirations Blood Pressure SpO2 Pain Score   10/23/24 0756 10/23/24 0756 10/23/24 0756 10/23/24 0756 10/23/24 0756 10/23/24 1641   97.5 °F (36.4 °C) 71 22 115/64 97 % 6     Weight (last 2 days)       None            Vital Signs (last 3 days)       Date/Time Temp Pulse Resp BP MAP (mmHg) SpO2 O2 Device Patient Position - Orthostatic VS [place] Coma Scale Score Pain    10/24/24 07:41:23 98.2 °F (36.8 °C) 71 -- 101/60 74 93 % -- -- -- --    10/24/24 0020 -- -- -- -- -- -- -- -- -- 5    10/23/24 21:09:44 98.8 °F (37.1 °C) 65 -- 101/62 75 90 % -- -- -- --    10/23/24 2005 -- -- -- -- -- -- -- -- -- 6    10/23/24 1930 -- -- -- -- -- -- -- -- 15 --    10/23/24 1739 -- -- -- -- -- -- -- -- -- 5    10/23/24 17:33:17 98.3 °F (36.8 °C) 67 -- 119/63 82 95 % -- -- -- --    10/23/24 1730 -- -- -- -- -- --  -- -- 15 5    10/23/24 1641 -- 74 20 106/54 74 95 % None (Room air) Sitting -- 6    10/23/24 1255 -- -- -- -- -- -- -- -- 15 --    10/23/24 0810 -- -- -- -- -- -- None (Room air) -- 15 --    10/23/24 0756 97.5 °F (36.4 °C) 71 22 115/64 80 97 % None (Room air) Lying -- --              Pertinent Labs/Diagnostic Test Results:   Radiology:  CT head without contrast   Final Interpretation by Armando Hernandez MD (10/23 1013)      No acute intracranial abnormality.                  Workstation performed: [id number]         CT spine cervical without contrast   Final Interpretation by Aramndo Hernandez MD (10/23 1019)      No cervical spine fracture or traumatic malalignment.                  Workstation performed: [id number]           Cardiology:  ECG 12 lead   Final Result by Sheldon Velasquez MD (10/23 1109)   Normal sinus rhythm with sinus arrhythmia   Nonspecific T wave abnormality   Abnormal ECG   When compared with ECG of 26-DEC-2022 15:26,   Nonspecific T wave abnormality now evident in Anterior leads   Confirmed by Sheldon Velasquez ([id number]) on 10/23/2024 11:08:57 AM        GI:  No orders to display           Results from last 7 days   Lab Units 10/24/24  0605 10/23/24  0813   WBC Thousand/uL 6.27 5.87   HEMOGLOBIN g/dL 11.1* 12.2   HEMATOCRIT % 34.5* 38.3   PLATELETS Thousands/uL 222 265   TOTAL NEUT ABS Thousands/µL  --  2.47         Results from last 7 days   Lab Units 10/24/24  0605 10/23/24  0813   SODIUM mmol/L 138 135   POTASSIUM mmol/L 4.2 3.9   CHLORIDE mmol/L 111* 106   CO2 mmol/L 22 20*   ANION GAP mmol/L 5 9   BUN mg/dL 13 17   CREATININE mg/dL 1.05 1.53*   EGFR ml/min/1.73sq m 58 36   CALCIUM mg/dL 8.5 9.5   MAGNESIUM mg/dL  --  1.9     Results from last 7 days   Lab Units 10/23/24  0813   AST U/L 25   ALT U/L 30   ALK PHOS U/L 32*   TOTAL PROTEIN g/dL 7.5   ALBUMIN g/dL 4.3   TOTAL BILIRUBIN mg/dL 0.52         Results from last 7 days   Lab Units 10/24/24  0605 10/23/24  0813   GLUCOSE RANDOM mg/dL  90 104           Results from last 7 days   Lab Units 10/23/24  1018   CLARITY UA  Turbid   COLOR UA  Light Yellow   SPEC GRAV UA  1.004   PH UA  5.5   GLUCOSE UA mg/dl Negative   KETONES UA mg/dl Negative   BLOOD UA  Large*   PROTEIN UA mg/dl Trace*   NITRITE UA  Negative   BILIRUBIN UA  Negative   UROBILINOGEN UA (BE) mg/dl <2.0   LEUKOCYTES UA  Moderate*   WBC UA /hpf 10-20*   RBC UA /hpf Innumerable*   BACTERIA UA /hpf Occasional   EPITHELIAL CELLS WET PREP /hpf Occasional   MUCUS THREADS  Occasional*         Past Medical History:   Diagnosis Date    Ankle fracture     Asthma     Cervical spinal stenosis     Closed left arm fracture, sequela     Concussion     Degeneration, intervertebral disc, cervical     Fibromyalgia, primary     Hypertension     Kidney stone     Migraine     Mixed hyperlipidemia 01/05/2021    Right arm fracture     Seasonal allergies     Shingles     Vitamin D deficiency      Present on Admission:   Syncope   Hypertension   COLT (acute kidney injury) ([facility])   Urinary tract infection      Admitting Diagnosis: Neck pain [M54.2]  Syncope [R55]  COLT (acute kidney injury) ([facility]) [N17.9]  Traumatic injury of head, initial encounter [S09.90XA]  Ureteral stent present [Z96.0]  Age/Sex: 59 y.o. female    Network Utilization Review Department  ATTENTION: Please call with any questions or concerns to [phone] and carefully listen to the prompts so that you are directed to the right person. All voicemails are confidential.   For Discharge needs, contact Care Management DC Support Team at [phone] opt. [phone]  Send all requests for admission clinical reviews, approved or denied determinations and any other requests to dedicated fax number below belonging to the campus where the patient is receiving treatment. List of dedicated fax numbers for the Facilities:  FACILITY NAME UR FAX NUMBER   ADMISSION DENIALS (Administrative/Medical Necessity) [phone]   DISCHARGE SUPPORT TEAM (NETWORK) [phone]    PARENT CHILD HEALTH (Maternity/NICU/Pediatrics) [phone]   [facility] [phone]   [facility] [phone]   [facility] [phone]   [facility] [phone]   [facility] [phone]   [facility] [phone]   [facility] [phone]   [facility] [phone]   [facility] [phone]   [facility] [phone]   [facility] [phone]   [facility] [phone]

## 2024-10-24 NOTE — ASSESSMENT & PLAN NOTE
-hold SGLT2, ARB, and Mobic (takes for fibromyalgia, states last dose was 10/18/24)  -IVFs  -trend Cr, if no improvement in 24 hours c/s renal

## 2024-10-24 NOTE — ASSESSMENT & PLAN NOTE
The patient was standing and felt lightheaded. She went to lean against a door and then fell stating she briefly passed out.  No other presyncopal or postsyncopal symptoms including chest pain, shortness of breath, nausea, vomiting, diaphoresis, palpitations.  Patient reports that she has had slightly decreased oral intake.   CT head: No acute intracranial abnormality.   CT spine: No cervical spine fracture or traumatic malalignment.   EKG NSR with no ST/T changes  S/p IVF resuscitation during admission  Orthostatic BP lying 114/68 HR 74; sitting 102/60 HR 74; standing 112/64 HR 82   Cannot rule out orthostasis as patient did not have orthostatic BPs on admission; however, negative for orthostatic hypotension today. She tells me that she has still been going to work and been having poor fluid intake for the past 10 days as she has had started antibiotics again for UTI. Syncope most likely related to mild dehydration related to recent UTI and poor fluid intake.

## 2024-10-24 NOTE — ASSESSMENT & PLAN NOTE
Hold SGLT2, ARB, and Mobic (takes for fibromyalgia, states last dose was 10/18/24)  S/p IVF resuscitation during admission  Creatinine greatly improved, 1.53 on admission; now 1.05 which appears to be patients baseline.

## 2024-10-25 ENCOUNTER — HOSPITAL ENCOUNTER (OUTPATIENT)
Dept: RADIOLOGY | Facility: HOSPITAL | Age: 59
Setting detail: OUTPATIENT SURGERY
Discharge: HOME | End: 2024-10-25
Payer: COMMERCIAL

## 2024-10-25 ENCOUNTER — APPOINTMENT (OUTPATIENT)
Dept: RADIOLOGY | Facility: HOSPITAL | Age: 59
End: 2024-10-25
Payer: COMMERCIAL

## 2024-10-25 ENCOUNTER — HOSPITAL ENCOUNTER (OUTPATIENT)
Facility: HOSPITAL | Age: 59
Setting detail: OUTPATIENT SURGERY
Discharge: HOME | End: 2024-10-25
Attending: UROLOGY | Admitting: UROLOGY
Payer: COMMERCIAL

## 2024-10-25 ENCOUNTER — ANESTHESIA (OUTPATIENT)
Dept: PERIOP | Facility: HOSPITAL | Age: 59
End: 2024-10-25
Payer: COMMERCIAL

## 2024-10-25 VITALS
TEMPERATURE: 97.2 F | OXYGEN SATURATION: 94 % | BODY MASS INDEX: 34.41 KG/M2 | DIASTOLIC BLOOD PRESSURE: 72 MMHG | SYSTOLIC BLOOD PRESSURE: 114 MMHG | WEIGHT: 206.79 LBS | RESPIRATION RATE: 16 BRPM | HEART RATE: 63 BPM

## 2024-10-25 DIAGNOSIS — N20.1 URETERAL CALCULI: Primary | ICD-10-CM

## 2024-10-25 DIAGNOSIS — R73.03 PREDIABETES: ICD-10-CM

## 2024-10-25 DIAGNOSIS — N83.202 CYST OF LEFT OVARY: ICD-10-CM

## 2024-10-25 PROCEDURE — C1894 INTRO/SHEATH, NON-LASER: HCPCS | Performed by: UROLOGY

## 2024-10-25 PROCEDURE — 82360 CALCULUS ASSAY QUANT: CPT | Performed by: UROLOGY

## 2024-10-25 PROCEDURE — C1769 GUIDE WIRE: HCPCS | Performed by: UROLOGY

## 2024-10-25 PROCEDURE — C2625 STENT, NON-COR, TEM W/DEL SY: HCPCS | Performed by: UROLOGY

## 2024-10-25 PROCEDURE — 52352 CYSTOURETERO W/STONE REMOVE: CPT | Performed by: UROLOGY

## 2024-10-25 PROCEDURE — 74420 UROGRAPHY RTRGR +-KUB: CPT

## 2024-10-25 PROCEDURE — C1758 CATHETER, URETERAL: HCPCS | Performed by: UROLOGY

## 2024-10-25 PROCEDURE — 52332 CYSTOSCOPY AND TREATMENT: CPT | Performed by: UROLOGY

## 2024-10-25 RX ORDER — MIDAZOLAM HYDROCHLORIDE 2 MG/2ML
INJECTION, SOLUTION INTRAMUSCULAR; INTRAVENOUS AS NEEDED
Status: DISCONTINUED | OUTPATIENT
Start: 2024-10-25 | End: 2024-10-25

## 2024-10-25 RX ORDER — PHENAZOPYRIDINE HYDROCHLORIDE 100 MG/1
100 TABLET, FILM COATED ORAL
Status: DISCONTINUED | OUTPATIENT
Start: 2024-10-25 | End: 2024-10-25 | Stop reason: HOSPADM

## 2024-10-25 RX ORDER — ONDANSETRON 2 MG/ML
4 INJECTION INTRAMUSCULAR; INTRAVENOUS EVERY 6 HOURS PRN
Status: DISCONTINUED | OUTPATIENT
Start: 2024-10-25 | End: 2024-10-25 | Stop reason: HOSPADM

## 2024-10-25 RX ORDER — ONDANSETRON 2 MG/ML
INJECTION INTRAMUSCULAR; INTRAVENOUS AS NEEDED
Status: DISCONTINUED | OUTPATIENT
Start: 2024-10-25 | End: 2024-10-25

## 2024-10-25 RX ORDER — PHENYLEPHRINE HCL IN 0.9% NACL 1 MG/10 ML
SYRINGE (ML) INTRAVENOUS AS NEEDED
Status: DISCONTINUED | OUTPATIENT
Start: 2024-10-25 | End: 2024-10-25

## 2024-10-25 RX ORDER — OXYBUTYNIN CHLORIDE 5 MG/1
5 TABLET, EXTENDED RELEASE ORAL DAILY
Qty: 5 TABLET | Refills: 0 | Status: SHIPPED | OUTPATIENT
Start: 2024-10-25

## 2024-10-25 RX ORDER — CEFAZOLIN SODIUM 2 G/50ML
2000 SOLUTION INTRAVENOUS ONCE
Status: COMPLETED | OUTPATIENT
Start: 2024-10-25 | End: 2024-10-25

## 2024-10-25 RX ORDER — SODIUM CHLORIDE 9 MG/ML
125 INJECTION, SOLUTION INTRAVENOUS CONTINUOUS
Status: DISCONTINUED | OUTPATIENT
Start: 2024-10-25 | End: 2024-10-25 | Stop reason: HOSPADM

## 2024-10-25 RX ORDER — DEXAMETHASONE SODIUM PHOSPHATE 10 MG/ML
INJECTION, SOLUTION INTRAMUSCULAR; INTRAVENOUS AS NEEDED
Status: DISCONTINUED | OUTPATIENT
Start: 2024-10-25 | End: 2024-10-25

## 2024-10-25 RX ORDER — LIDOCAINE HYDROCHLORIDE 20 MG/ML
INJECTION, SOLUTION EPIDURAL; INFILTRATION; INTRACAUDAL; PERINEURAL AS NEEDED
Status: DISCONTINUED | OUTPATIENT
Start: 2024-10-25 | End: 2024-10-25

## 2024-10-25 RX ORDER — FENTANYL CITRATE 50 UG/ML
50 INJECTION, SOLUTION INTRAMUSCULAR; INTRAVENOUS
Status: DISCONTINUED | OUTPATIENT
Start: 2024-10-25 | End: 2024-10-25 | Stop reason: HOSPADM

## 2024-10-25 RX ORDER — PROPOFOL 10 MG/ML
INJECTION, EMULSION INTRAVENOUS AS NEEDED
Status: DISCONTINUED | OUTPATIENT
Start: 2024-10-25 | End: 2024-10-25

## 2024-10-25 RX ORDER — FENTANYL CITRATE 50 UG/ML
INJECTION, SOLUTION INTRAMUSCULAR; INTRAVENOUS AS NEEDED
Status: DISCONTINUED | OUTPATIENT
Start: 2024-10-25 | End: 2024-10-25

## 2024-10-25 RX ORDER — TAMSULOSIN HYDROCHLORIDE 0.4 MG/1
0.4 CAPSULE ORAL
Qty: 7 CAPSULE | Refills: 0 | Status: SHIPPED | OUTPATIENT
Start: 2024-10-25

## 2024-10-25 RX ORDER — LEVOFLOXACIN 5 MG/ML
500 INJECTION, SOLUTION INTRAVENOUS ONCE
Status: DISCONTINUED | OUTPATIENT
Start: 2024-10-25 | End: 2024-10-25

## 2024-10-25 RX ORDER — HYDROCODONE BITARTRATE AND ACETAMINOPHEN 5; 325 MG/1; MG/1
1 TABLET ORAL EVERY 6 HOURS PRN
Status: DISCONTINUED | OUTPATIENT
Start: 2024-10-25 | End: 2024-10-25 | Stop reason: HOSPADM

## 2024-10-25 RX ORDER — PHENAZOPYRIDINE HYDROCHLORIDE 200 MG/1
200 TABLET, FILM COATED ORAL
Qty: 10 TABLET | Refills: 0 | Status: SHIPPED | OUTPATIENT
Start: 2024-10-25

## 2024-10-25 RX ADMIN — PROPOFOL 200 MG: 10 INJECTION, EMULSION INTRAVENOUS at 10:22

## 2024-10-25 RX ADMIN — LIDOCAINE HYDROCHLORIDE 100 MG: 20 INJECTION, SOLUTION EPIDURAL; INFILTRATION; INTRACAUDAL at 10:22

## 2024-10-25 RX ADMIN — MIDAZOLAM 2 MG: 1 INJECTION INTRAMUSCULAR; INTRAVENOUS at 10:15

## 2024-10-25 RX ADMIN — Medication 100 MCG: at 10:31

## 2024-10-25 RX ADMIN — CEFAZOLIN SODIUM 2000 MG: 2 SOLUTION INTRAVENOUS at 10:25

## 2024-10-25 RX ADMIN — SODIUM CHLORIDE 125 ML/HR: 0.9 INJECTION, SOLUTION INTRAVENOUS at 09:30

## 2024-10-25 RX ADMIN — FENTANYL CITRATE 50 MCG: 50 INJECTION INTRAMUSCULAR; INTRAVENOUS at 10:22

## 2024-10-25 RX ADMIN — DEXAMETHASONE SODIUM PHOSPHATE 10 MG: 10 INJECTION INTRAMUSCULAR; INTRAVENOUS at 10:22

## 2024-10-25 RX ADMIN — ONDANSETRON 4 MG: 2 INJECTION INTRAMUSCULAR; INTRAVENOUS at 10:22

## 2024-10-25 NOTE — OP NOTE
Operative Note     PATIENT:  Maria Luz Ponce (MRN [id number])    DATE OF PROCEDURE:   10/25/2024    PRE-OP DIAGNOSIS:   1) Left ureteral calculus    POST-OP DIAGNOSIS:   1) Left ureteral calculus    PROCEDURES PERFORMED:  1) Cystoscopy  2) Left retrograde pyelography with fluoroscopic interpretation  3) Left ureteroscopy with basket extraction of stone  4) Left ureteral stent exchange    SURGEON:  Eder Johns MD    NOTE:  There were no qualified teaching residents to assist with this case    ANESTHESIA: General     COMPLICATIONS:   None    ANTIBIOTICS:  Ancef    INTRAOPERATIVE THROMBOEMBOLISM PROPHYLAXIS:  Pneumatic compression stockings     FINDINGS:  Left proximal ureter stone identified and removed with basket  Left RPG shows no hydronephrosis or filling defects    INDICATIONS FOR PROCEDURE:  Maria Luz Ponce is an 59 y.o. old female with left ureteral nephrolithiasis.  After discussing the options, the patient elected to undergo ureteroscopy and ureteral stent placement.  We discussed the procedure in detail, the alternatives, and the risks, and they signed informed consent to proceed.    PROCEDURE IN DETAIL:   The patient was identified and brought to the OR.  Antibiotic prophylaxis and DVT prophylaxis were administered.  They were placed in the comfortable dorsal lithotomy position with care to pad all pressure points.  They were prepped and draped in the usual sterile fashion using hibiclens.  A surgical time out was performed with all in the room in agreement with the correct patient, procedure, indications, and laterality.  A 21-Urdu rigid cystoscope was used to enter the bladder.  The bladder was inspected in its entirety and there were no lesions noted.  The ureteral orifices were identified in their normal orthotopic positions.     The left ureteral orifice was identified and a 10 Fr dual lumen catheter was placed into the ureteral orifice alongside the preplaced ureteral stent which was then removed.    A retrograde pyelogram was performed with the findings as described above.  A Sensor wire was advanced up to the kidney under fluoroscopic guidance.  Leaving this safety wire in place, the bladder was drained.   A second working wire was then placed, and over this a 12/14 ureteral access sheath was sequentially passed under fluoroscopic guidance.  A  8.5 [place] digital flexible ureteroscope was advanced up the ureter under vision . The stone was encountered in the proximal ureter.  The stone was not noted to be impacted.      Based upon the size, morphology, and location of the stone I felt that was amenable to direct basket extraction without the need for holmium laser lithotripsy.  This is accomplished using a 1.9 [place] Nitinol tip stone basket.  The stone was removed under vision intact and in an atraumatic fashion.  I then reintroduced the endoscope confirmed that the patient was completely stone free and there is no injury to the ureter.           The ureteroscope was backed down the ureter under vision and there were no residual fragments and the ureter was noted to be intact with no injury and mild edema where the stone was located.   A JJ stent was then passed up the wire  under fluoroscopic guidance into the kidney with a good curl noted in the kidney and in the bladder.  An externalized tethering string was left in place and secured to the skin. The bladder was drained.      The patient was placed back supine, awakened from general anesthesia and brought to recovery room in stable condition.      ESTIMATED BLOOD LOSS:  Minimal      SPECIMENS:   Order Name Source Comment Collection Info Order Time   STONE ANALYSIS Ureter, Left  Collected By: Eder Johns MD 10/25/2024 10:53 AM        IMPLANTS:   Implant Name Type Inv. Item Serial No.  Lot No. LRB No. Used Action   STENT URETERAL 6FR 26CM INLAY OPTIMA - [id number]  STENT URETERAL 6FR 26CM INLAY OPTIMA  [employer] [id number] Left  1 Explanted   STENT URETERAL 6FR 26CM INLAY OPTIMA - [id number]  STENT URETERAL 6FR 26CM INLAY OPTIMA  [facility] [employer] [id number] Left 1 Implanted        COMPLICATIONS: None    DISPOSITION: PACU    PLAN:  Patient can remove left stent attached to a string in 5 days and then have renal sonogram in 4-6 weeks after stent removal

## 2024-10-25 NOTE — ANESTHESIA PREPROCEDURE EVALUATION
Procedure:  CYSTO USCOPE W/  LASER, &  STENT - second stage surgery (Left: Bladder)    Relevant Problems   ANESTHESIA (within normal limits)      CARDIO   (+) Hypertension   (+) Mixed hyperlipidemia      ENDO (within normal limits)      GI/HEPATIC   (+) GERD (gastroesophageal reflux disease) (Controlled on medication)   (+) Hepatic steatosis      MUSCULOSKELETAL   (+) Lumbar facet joint pain      NEURO/PSYCH  Recently hospitalized with dehydration syncope s/p fall, feeling better today but still some neck and shoulder pain.       PULMONARY   (+) Mild intermittent asthma      Neurology/Sleep   (+) Syncope        Physical Exam    Airway    Mallampati score: I  TM Distance: >3 FB  Neck ROM: full     Dental        Cardiovascular  Cardiovascular exam normal    Pulmonary  Pulmonary exam normal     Other Findings        Anesthesia Plan  ASA Score- 2     Anesthesia Type- general with ASA Monitors.         Additional Monitors:     Airway Plan: LMA.           Plan Factors-    Chart reviewed.    Patient summary reviewed.                  Induction- intravenous.    Postoperative Plan-     Perioperative Resuscitation Plan - Level 1 - Full Code.       Informed Consent- Anesthetic plan and risks discussed with patient.

## 2024-10-25 NOTE — ANESTHESIA POSTPROCEDURE EVALUATION
Post-Op Assessment Note    CV Status:  Stable  Pain Score: 0    Pain management: adequate       Mental Status:  Alert and awake   Hydration Status:  Euvolemic   PONV Controlled:  Controlled   Airway Patency:  Patent     Post Op Vitals Reviewed: Yes    No anethesia notable event occurred.    Staff: CRNA           Last Filed PACU Vitals:  Vitals Value Taken Time   Temp 97.3    Pulse 90 10/25/24 1113   /73 10/25/24 1110   Resp 14    SpO2 91 % 10/25/24 1113   Vitals shown include unfiled device data.    Modified Yanira:  No data recorded

## 2024-10-25 NOTE — ANESTHESIA POSTPROCEDURE EVALUATION
Post-Op Assessment Note    CV Status:  Stable    Pain management: adequate       Mental Status:  Alert and awake   Hydration Status:  Euvolemic   PONV Controlled:  Controlled   Airway Patency:  Patent     Post Op Vitals Reviewed: Yes    No anethesia notable event occurred.    Staff: Anesthesiologist           Last Filed PACU Vitals:  Vitals Value Taken Time   Temp 97.5 °F (36.4 °C) 10/25/24 1141   Pulse 62 10/25/24 1145   /71 10/25/24 1141   Resp 18 10/25/24 1141   SpO2 93 % 10/25/24 1145   Vitals shown include unfiled device data.    Modified Yanira:  Activity: 2 (10/25/2024 11:41 AM)  Respiration: 2 (10/25/2024 11:41 AM)  Circulation: 2 (10/25/2024 11:41 AM)  Consciousness: 2 (10/25/2024 11:41 AM)  Oxygen Saturation: 2 (10/25/2024 11:41 AM)  Modified Yanira Score: 10 (10/25/2024 11:41 AM)

## 2024-10-25 NOTE — INTERVAL H&P NOTE
H&P reviewed. After examining the patient I find no changes in the patients condition since the H&P had been written.    Vitals:    10/25/24 0920   BP: 122/69   Pulse: 72   Resp: 18   Temp: (!) 97.3 °F (36.3 °C)   SpO2: 95%

## 2024-10-25 NOTE — DISCHARGE INSTR - AVS FIRST PAGE
Maria Luz, your left ureteral stone was successfully removed today.  You have a new stent attached to a string that can be removed in 5 days.  You can remove it at home or have it removed in the office.      Ureteroscopy   WHAT YOU SHOULD KNOW:   A ureteroscopy is a procedure to examine in the inside of your urinary tract, which includes your urethra, bladder, ureters, and kidneys. A ureteroscope is a small, thin tube with a light and camera on the end. Ureteroscopy can help your healthcare provider diagnose and treat problems in your urinary tract, such as kidney stones.   AFTER YOU LEAVE:   Medicine:   Antibiotics  may be given to treat or prevent an infection.     Take your medicine as directed.  Call your healthcare provider if you think your medicine is not helping or if you have side effects. Tell him if you are allergic to any medicine. Keep a list of the medicines, vitamins, and herbs you take. Include the amounts, and when and why you take them. Bring the list or the pill bottles to follow-up visits. Carry your medicine list with you in case of an emergency.  Follow up with your healthcare provider as directed:  Write down your questions so you remember to ask them during your visits.  Drink liquids as directed.  Liquids can help prevent kidney stones and urinary tract infections. Drink water and limit the amount of caffeine you drink. Caffeine may be found in coffee, tea, soda, sports drinks, and foods. Ask your healthcare provider how much liquid to drink each day.  Contact your healthcare provider if:   You have a fever.     You cannot urinate.    You have blood clots in your urine.    You are vomiting.    You have severe pain in your abdomen or side.     You have questions or concerns about your condition or care.  © 2014 [employer] Information is for End User's use only and may not be sold, redistributed or otherwise used for commercial purposes. All illustrations and images included  in CareNotes® are the copyrighted property of [employer] or [employer].  The above information is an  only. It is not intended as medical advice for individual conditions or treatments. Talk to your doctor, nurse or pharmacist before following any medical regimen to see if it is safe and effective for you.

## 2024-10-30 ENCOUNTER — ANESTHESIA EVENT (OUTPATIENT)
Dept: PERIOP | Facility: HOSPITAL | Age: 59
End: 2024-10-30
Payer: COMMERCIAL

## 2024-10-31 ENCOUNTER — LAB REQUISITION (OUTPATIENT)
Dept: LAB | Facility: HOSPITAL | Age: 59
End: 2024-10-31
Payer: COMMERCIAL

## 2024-10-31 ENCOUNTER — APPOINTMENT (OUTPATIENT)
Dept: LAB | Facility: AMBULARY SURGERY CENTER | Age: 59
End: 2024-10-31
Payer: COMMERCIAL

## 2024-10-31 ENCOUNTER — CLINICAL SUPPORT (OUTPATIENT)
Dept: BARIATRICS | Facility: CLINIC | Age: 59
End: 2024-10-31

## 2024-10-31 VITALS — HEIGHT: 65 IN | BODY MASS INDEX: 34.79 KG/M2 | WEIGHT: 208.8 LBS

## 2024-10-31 DIAGNOSIS — N83.202 CYST OF LEFT OVARY: ICD-10-CM

## 2024-10-31 DIAGNOSIS — R63.5 ABNORMAL WEIGHT GAIN: Primary | ICD-10-CM

## 2024-10-31 DIAGNOSIS — N83.202 UNSPECIFIED OVARIAN CYST, LEFT SIDE: ICD-10-CM

## 2024-10-31 LAB
ABO GROUP BLD: NORMAL
BLD GP AB SCN SERPL QL: NEGATIVE
EST. AVERAGE GLUCOSE BLD GHB EST-MCNC: 108 MG/DL
HBA1C MFR BLD: 5.4 %
RH BLD: NEGATIVE
SPECIMEN EXPIRATION DATE: NORMAL

## 2024-10-31 PROCEDURE — 86901 BLOOD TYPING SEROLOGIC RH(D): CPT | Performed by: OBSTETRICS & GYNECOLOGY

## 2024-10-31 PROCEDURE — RECHECK

## 2024-10-31 PROCEDURE — 86900 BLOOD TYPING SEROLOGIC ABO: CPT | Performed by: OBSTETRICS & GYNECOLOGY

## 2024-10-31 PROCEDURE — 36415 COLL VENOUS BLD VENIPUNCTURE: CPT

## 2024-10-31 PROCEDURE — 86850 RBC ANTIBODY SCREEN: CPT | Performed by: OBSTETRICS & GYNECOLOGY

## 2024-10-31 PROCEDURE — 83036 HEMOGLOBIN GLYCOSYLATED A1C: CPT

## 2024-11-01 ENCOUNTER — HOSPITAL ENCOUNTER (OUTPATIENT)
Dept: RADIOLOGY | Facility: HOSPITAL | Age: 59
Discharge: HOME | End: 2024-11-01
Payer: COMMERCIAL

## 2024-11-01 DIAGNOSIS — N83.202 CYST OF LEFT OVARY: ICD-10-CM

## 2024-11-01 LAB
CALCIUM OXALATE DIHYDRATE MFR STONE IR: 90 %
COLOR STONE: NORMAL
COM MFR STONE: 10 %
COMMENT-STONE3: NORMAL
COMPOSITION: NORMAL
LABORATORY COMMENT REPORT: NORMAL
PHOTO: NORMAL
SIZE STONE: NORMAL MM
SPEC SOURCE SUBJ: NORMAL
STONE ANALYSIS-IMP: NORMAL
STONE ANALYSIS-IMP: NORMAL
WT STONE: 17 MG

## 2024-11-01 PROCEDURE — 71046 X-RAY EXAM CHEST 2 VIEWS: CPT

## 2024-11-02 ENCOUNTER — APPOINTMENT (OUTPATIENT)
Dept: URGENT CARE | Facility: MEDICAL CENTER | Age: 59
End: 2024-11-02
Payer: OTHER MISCELLANEOUS

## 2024-11-02 PROCEDURE — 99215 OFFICE O/P EST HI 40 MIN: CPT | Performed by: PHYSICIAN ASSISTANT

## 2024-11-04 ENCOUNTER — APPOINTMENT (OUTPATIENT)
Dept: URGENT CARE | Facility: MEDICAL CENTER | Age: 59
End: 2024-11-04
Payer: OTHER MISCELLANEOUS

## 2024-11-04 PROCEDURE — 99213 OFFICE O/P EST LOW 20 MIN: CPT | Performed by: NURSE PRACTITIONER

## 2024-11-06 ENCOUNTER — APPOINTMENT (OUTPATIENT)
Dept: URGENT CARE | Facility: MEDICAL CENTER | Age: 59
End: 2024-11-06
Payer: OTHER MISCELLANEOUS

## 2024-11-06 PROCEDURE — 99213 OFFICE O/P EST LOW 20 MIN: CPT | Performed by: NURSE PRACTITIONER

## 2024-11-06 NOTE — PRE-PROCEDURE INSTRUCTIONS
Pre-Surgery Instructions:   Medication Instructions    acetaminophen (TYLENOL) 500 mg tablet Uses PRN- OK to take day of surgery    albuterol (PROVENTIL HFA,VENTOLIN HFA) 90 mcg/act inhaler Uses PRN- OK to take day of surgery    Cholecalciferol (VITAMIN D3) 3000 units TABS Stop taking 7 days prior to surgery.    Diclofenac Sodium (VOLTAREN) 1 % Hold day of surgery.    dicyclomine (BENTYL) 10 mg capsule Take as directed    DULoxetine (CYMBALTA) 60 mg delayed release capsule Take as directed    famotidine (PEPCID) 10 mg tablet Take as directed    fexofenadine (ALLEGRA) 180 MG tablet Take as directed    fluticasone-salmeterol (ADVAIR, WIXELA) 100-50 mcg/dose inhaler Take day of surgery.    gabapentin (NEURONTIN) 100 mg capsule Take as directed    melatonin 3 mg Take as direcrted    meloxicam (MOBIC) 15 mg tablet Stop taking 7 days prior to surgery.    methocarbamol (ROBAXIN) 750 mg tablet Take as directed    Omega-3 1000 MG CAPS Stop taking 7 days prior to surgery.    Probiotic Product (PROBIOTIC BLEND PO) Take as directed    TART CHERRY PO Hold for 7 days prior to surgery    tirzepatide (Zepbound) 5 mg/0.5 mL auto-injector Stop taking 7 days prior to surgery. LD 11/5/24    valsartan (DIOVAN) 160 mg tablet Hold day of surgery.        Medication instructions for day surgery reviewed. Please use only a sip of water to take your instructed medications. Avoid all over the counter vitamins, supplements and NSAIDS for one week prior to surgery per anesthesia guidelines. Tylenol is ok to take as needed.     You will receive a call one business day prior to surgery with an arrival time and hospital directions. If your surgery is scheduled on a Monday, the hospital will be calling you on the Friday prior to your surgery. If you have not heard from anyone by 8pm, please call the hospital supervisor through the hospital  at [phone]. ([place] [phone] or [place] [phone]).    Do not eat or drink anything  after midnight the night before your surgery, including candy, mints, lifesavers, or chewing gum. Do not drink alcohol 24hrs before your surgery. Try not to smoke at least 24hrs before your surgery.       Follow the pre surgery showering instructions as listed in the “My Surgical Experience Booklet” or otherwise provided by your surgeon's office. Do not use a blade to shave the surgical area 1 week before surgery. It is okay to use a clean electric clippers up to 24 hours before surgery. Do not apply any lotions, creams, including makeup, cologne, deodorant, or perfumes after showering on the day of your surgery. Do not use dry shampoo, hair spray, hair gel, or any type of hair products.     No contact lenses, eye make-up, or artificial eyelashes. Remove nail polish, including gel polish, and any artificial, gel, or acrylic nails if possible. Remove all jewelry including rings and body piercing jewelry.     Wear causal clothing that is easy to take on and off. Consider your type of surgery.    Keep any valuables, jewelry, piercings at home. Please bring any specially ordered equipment (sling, braces) if indicated.    Arrange for a responsible person to drive you to and from the hospital on the day of your surgery. Please confirm the visitor policy for the day of your procedure when you receive your phone call with an arrival time.     Call the surgeon's office with any new illnesses, exposures, or additional questions prior to surgery.    Please reference your “My Surgical Experience Booklet” for additional information to prepare for your upcoming surgery.

## 2024-11-07 ENCOUNTER — CLINICAL SUPPORT (OUTPATIENT)
Dept: BARIATRICS | Facility: CLINIC | Age: 59
End: 2024-11-07

## 2024-11-07 VITALS — HEIGHT: 65 IN | BODY MASS INDEX: 34.32 KG/M2 | WEIGHT: 206 LBS

## 2024-11-07 DIAGNOSIS — R63.5 ABNORMAL WEIGHT GAIN: Primary | ICD-10-CM

## 2024-11-07 PROCEDURE — RECHECK

## 2024-11-12 PROBLEM — E66.811 OBESITY (BMI 30.0-34.9): Status: ACTIVE | Noted: 2024-11-12

## 2024-11-13 ENCOUNTER — HOSPITAL ENCOUNTER (OUTPATIENT)
Facility: HOSPITAL | Age: 59
Setting detail: OUTPATIENT SURGERY
Discharge: HOME | End: 2024-11-13
Attending: OBSTETRICS & GYNECOLOGY | Admitting: OBSTETRICS & GYNECOLOGY
Payer: COMMERCIAL

## 2024-11-13 ENCOUNTER — ANESTHESIA (OUTPATIENT)
Dept: PERIOP | Facility: HOSPITAL | Age: 59
End: 2024-11-13
Payer: COMMERCIAL

## 2024-11-13 VITALS
HEIGHT: 65 IN | HEART RATE: 83 BPM | BODY MASS INDEX: 34.2 KG/M2 | OXYGEN SATURATION: 94 % | RESPIRATION RATE: 16 BRPM | WEIGHT: 205.25 LBS | DIASTOLIC BLOOD PRESSURE: 72 MMHG | TEMPERATURE: 97.9 F | SYSTOLIC BLOOD PRESSURE: 122 MMHG

## 2024-11-13 DIAGNOSIS — N83.202 CYST OF LEFT OVARY: ICD-10-CM

## 2024-11-13 LAB
ABO GROUP BLD: NORMAL
RH BLD: NEGATIVE

## 2024-11-13 PROCEDURE — NC001 PR NO CHARGE: Performed by: OBSTETRICS & GYNECOLOGY

## 2024-11-13 PROCEDURE — C1765 ADHESION BARRIER: HCPCS | Performed by: OBSTETRICS & GYNECOLOGY

## 2024-11-13 PROCEDURE — 88305 TISSUE EXAM BY PATHOLOGIST: CPT | Performed by: STUDENT IN AN ORGANIZED HEALTH CARE EDUCATION/TRAINING PROGRAM

## 2024-11-13 PROCEDURE — 88307 TISSUE EXAM BY PATHOLOGIST: CPT | Performed by: STUDENT IN AN ORGANIZED HEALTH CARE EDUCATION/TRAINING PROGRAM

## 2024-11-13 PROCEDURE — 88331 PATH CONSLTJ SURG 1 BLK 1SPC: CPT | Performed by: STUDENT IN AN ORGANIZED HEALTH CARE EDUCATION/TRAINING PROGRAM

## 2024-11-13 PROCEDURE — 58571 TLH W/T/O 250 G OR LESS: CPT | Performed by: OBSTETRICS & GYNECOLOGY

## 2024-11-13 PROCEDURE — 52005 CYSTO W/URTRL CATHJ: CPT | Performed by: OBSTETRICS & GYNECOLOGY

## 2024-11-13 PROCEDURE — 88112 CYTOPATH CELL ENHANCE TECH: CPT | Performed by: STUDENT IN AN ORGANIZED HEALTH CARE EDUCATION/TRAINING PROGRAM

## 2024-11-13 PROCEDURE — 88360 TUMOR IMMUNOHISTOCHEM/MANUAL: CPT | Performed by: STUDENT IN AN ORGANIZED HEALTH CARE EDUCATION/TRAINING PROGRAM

## 2024-11-13 PROCEDURE — 88342 IMHCHEM/IMCYTCHM 1ST ANTB: CPT | Performed by: STUDENT IN AN ORGANIZED HEALTH CARE EDUCATION/TRAINING PROGRAM

## 2024-11-13 RX ORDER — DEXAMETHASONE SODIUM PHOSPHATE 10 MG/ML
INJECTION, SOLUTION INTRAMUSCULAR; INTRAVENOUS AS NEEDED
Status: DISCONTINUED | OUTPATIENT
Start: 2024-11-13 | End: 2024-11-13

## 2024-11-13 RX ORDER — PROPOFOL 10 MG/ML
INJECTION, EMULSION INTRAVENOUS AS NEEDED
Status: DISCONTINUED | OUTPATIENT
Start: 2024-11-13 | End: 2024-11-13

## 2024-11-13 RX ORDER — OXYCODONE HYDROCHLORIDE 5 MG/1
5 TABLET ORAL EVERY 6 HOURS PRN
Qty: 10 TABLET | Refills: 0 | Status: SHIPPED | OUTPATIENT
Start: 2024-11-13 | End: 2024-11-23

## 2024-11-13 RX ORDER — LIDOCAINE HCL/PF 100 MG/5ML
SYRINGE (ML) INJECTION AS NEEDED
Status: DISCONTINUED | OUTPATIENT
Start: 2024-11-13 | End: 2024-11-13

## 2024-11-13 RX ORDER — ROCURONIUM BROMIDE 10 MG/ML
INJECTION, SOLUTION INTRAVENOUS AS NEEDED
Status: DISCONTINUED | OUTPATIENT
Start: 2024-11-13 | End: 2024-11-13

## 2024-11-13 RX ORDER — ONDANSETRON 2 MG/ML
4 INJECTION INTRAMUSCULAR; INTRAVENOUS ONCE AS NEEDED
Status: DISCONTINUED | OUTPATIENT
Start: 2024-11-13 | End: 2024-11-13 | Stop reason: HOSPADM

## 2024-11-13 RX ORDER — ONDANSETRON 2 MG/ML
4 INJECTION INTRAMUSCULAR; INTRAVENOUS EVERY 6 HOURS PRN
Status: DISCONTINUED | OUTPATIENT
Start: 2024-11-13 | End: 2024-11-13 | Stop reason: HOSPADM

## 2024-11-13 RX ORDER — CLINDAMYCIN PHOSPHATE 900 MG/50ML
900 INJECTION, SOLUTION INTRAVENOUS ONCE
Status: COMPLETED | OUTPATIENT
Start: 2024-11-13 | End: 2024-11-13

## 2024-11-13 RX ORDER — ONDANSETRON 2 MG/ML
INJECTION INTRAMUSCULAR; INTRAVENOUS AS NEEDED
Status: DISCONTINUED | OUTPATIENT
Start: 2024-11-13 | End: 2024-11-13

## 2024-11-13 RX ORDER — SODIUM CHLORIDE 9 MG/ML
125 INJECTION, SOLUTION INTRAVENOUS CONTINUOUS
Status: DISCONTINUED | OUTPATIENT
Start: 2024-11-13 | End: 2024-11-13 | Stop reason: HOSPADM

## 2024-11-13 RX ORDER — METAXALONE 800 MG/1
800 TABLET ORAL 3 TIMES DAILY
COMMUNITY

## 2024-11-13 RX ORDER — BUPIVACAINE HYDROCHLORIDE 2.5 MG/ML
INJECTION, SOLUTION EPIDURAL; INFILTRATION; INTRACAUDAL AS NEEDED
Status: DISCONTINUED | OUTPATIENT
Start: 2024-11-13 | End: 2024-11-13 | Stop reason: HOSPADM

## 2024-11-13 RX ORDER — HYDROMORPHONE HCL/PF 1 MG/ML
0.5 SYRINGE (ML) INJECTION
Status: DISCONTINUED | OUTPATIENT
Start: 2024-11-13 | End: 2024-11-13 | Stop reason: HOSPADM

## 2024-11-13 RX ORDER — SUCCINYLCHOLINE/SOD CL,ISO/PF 100 MG/5ML
SYRINGE (ML) INTRAVENOUS AS NEEDED
Status: DISCONTINUED | OUTPATIENT
Start: 2024-11-13 | End: 2024-11-13

## 2024-11-13 RX ORDER — HEPARIN SODIUM 5000 [USP'U]/ML
5000 INJECTION, SOLUTION INTRAVENOUS; SUBCUTANEOUS
Status: COMPLETED | OUTPATIENT
Start: 2024-11-13 | End: 2024-11-13

## 2024-11-13 RX ORDER — FENTANYL CITRATE/PF 50 MCG/ML
25 SYRINGE (ML) INJECTION
Status: DISCONTINUED | OUTPATIENT
Start: 2024-11-13 | End: 2024-11-13 | Stop reason: HOSPADM

## 2024-11-13 RX ORDER — PROMETHAZINE HYDROCHLORIDE 25 MG/ML
6.25 INJECTION, SOLUTION INTRAMUSCULAR; INTRAVENOUS ONCE AS NEEDED
Status: DISCONTINUED | OUTPATIENT
Start: 2024-11-13 | End: 2024-11-13 | Stop reason: HOSPADM

## 2024-11-13 RX ORDER — ACETAMINOPHEN 325 MG/1
975 TABLET ORAL ONCE
Status: DISCONTINUED | OUTPATIENT
Start: 2024-11-13 | End: 2024-11-13 | Stop reason: HOSPADM

## 2024-11-13 RX ORDER — FENTANYL CITRATE 50 UG/ML
INJECTION, SOLUTION INTRAMUSCULAR; INTRAVENOUS AS NEEDED
Status: DISCONTINUED | OUTPATIENT
Start: 2024-11-13 | End: 2024-11-13

## 2024-11-13 RX ORDER — SODIUM CHLORIDE, SODIUM LACTATE, POTASSIUM CHLORIDE, CALCIUM CHLORIDE 600; 310; 30; 20 MG/100ML; MG/100ML; MG/100ML; MG/100ML
125 INJECTION, SOLUTION INTRAVENOUS CONTINUOUS
Status: DISCONTINUED | OUTPATIENT
Start: 2024-11-13 | End: 2024-11-13 | Stop reason: HOSPADM

## 2024-11-13 RX ORDER — HYDROMORPHONE HCL/PF 1 MG/ML
SYRINGE (ML) INJECTION AS NEEDED
Status: DISCONTINUED | OUTPATIENT
Start: 2024-11-13 | End: 2024-11-13

## 2024-11-13 RX ORDER — OXYCODONE HYDROCHLORIDE 5 MG/1
5 TABLET ORAL EVERY 4 HOURS PRN
Refills: 0 | Status: DISCONTINUED | OUTPATIENT
Start: 2024-11-13 | End: 2024-11-13 | Stop reason: HOSPADM

## 2024-11-13 RX ORDER — ACETAMINOPHEN 325 MG/1
975 TABLET ORAL EVERY 6 HOURS PRN
Status: DISCONTINUED | OUTPATIENT
Start: 2024-11-13 | End: 2024-11-13 | Stop reason: HOSPADM

## 2024-11-13 RX ORDER — MIDAZOLAM HYDROCHLORIDE 2 MG/2ML
INJECTION, SOLUTION INTRAMUSCULAR; INTRAVENOUS AS NEEDED
Status: DISCONTINUED | OUTPATIENT
Start: 2024-11-13 | End: 2024-11-13

## 2024-11-13 RX ADMIN — MIDAZOLAM 2 MG: 1 INJECTION INTRAMUSCULAR; INTRAVENOUS at 07:33

## 2024-11-13 RX ADMIN — Medication 100 MG: at 07:39

## 2024-11-13 RX ADMIN — FENTANYL CITRATE 25 MCG: 50 INJECTION, SOLUTION INTRAMUSCULAR; INTRAVENOUS at 13:09

## 2024-11-13 RX ADMIN — ROCURONIUM 35 MG: 50 INJECTION, SOLUTION INTRAVENOUS at 07:42

## 2024-11-13 RX ADMIN — ROCURONIUM 5 MG: 50 INJECTION, SOLUTION INTRAVENOUS at 07:39

## 2024-11-13 RX ADMIN — FENTANYL CITRATE 50 MCG: 50 INJECTION INTRAMUSCULAR; INTRAVENOUS at 07:39

## 2024-11-13 RX ADMIN — DEXAMETHASONE SODIUM PHOSPHATE 10 MG: 10 INJECTION INTRAMUSCULAR; INTRAVENOUS at 07:49

## 2024-11-13 RX ADMIN — SUGAMMADEX 200 MG: 100 INJECTION, SOLUTION INTRAVENOUS at 11:02

## 2024-11-13 RX ADMIN — ONDANSETRON 4 MG: 2 INJECTION INTRAMUSCULAR; INTRAVENOUS at 10:48

## 2024-11-13 RX ADMIN — HEPARIN SODIUM 5000 UNITS: 5000 INJECTION INTRAVENOUS; SUBCUTANEOUS at 06:03

## 2024-11-13 RX ADMIN — PROPOFOL 200 MG: 10 INJECTION, EMULSION INTRAVENOUS at 07:39

## 2024-11-13 RX ADMIN — SODIUM CHLORIDE, SODIUM LACTATE, POTASSIUM CHLORIDE, AND CALCIUM CHLORIDE: .6; .31; .03; .02 INJECTION, SOLUTION INTRAVENOUS at 07:54

## 2024-11-13 RX ADMIN — ROCURONIUM 10 MG: 50 INJECTION, SOLUTION INTRAVENOUS at 09:41

## 2024-11-13 RX ADMIN — SODIUM CHLORIDE, SODIUM LACTATE, POTASSIUM CHLORIDE, AND CALCIUM CHLORIDE 125 ML/HR: .6; .31; .03; .02 INJECTION, SOLUTION INTRAVENOUS at 11:54

## 2024-11-13 RX ADMIN — SODIUM CHLORIDE 125 ML/HR: 0.9 INJECTION, SOLUTION INTRAVENOUS at 05:54

## 2024-11-13 RX ADMIN — ROCURONIUM 10 MG: 50 INJECTION, SOLUTION INTRAVENOUS at 08:59

## 2024-11-13 RX ADMIN — FENTANYL CITRATE 50 MCG: 50 INJECTION INTRAMUSCULAR; INTRAVENOUS at 08:07

## 2024-11-13 RX ADMIN — GENTAMICIN SULFATE 109.2 MG: 40 INJECTION, SOLUTION INTRAMUSCULAR; INTRAVENOUS at 08:26

## 2024-11-13 RX ADMIN — HYDROMORPHONE HYDROCHLORIDE 0.5 MG: 1 INJECTION, SOLUTION INTRAMUSCULAR; INTRAVENOUS; SUBCUTANEOUS at 09:49

## 2024-11-13 RX ADMIN — FENTANYL CITRATE 50 MCG: 50 INJECTION INTRAMUSCULAR; INTRAVENOUS at 08:32

## 2024-11-13 RX ADMIN — FENTANYL CITRATE 50 MCG: 50 INJECTION INTRAMUSCULAR; INTRAVENOUS at 09:05

## 2024-11-13 RX ADMIN — ROCURONIUM 10 MG: 50 INJECTION, SOLUTION INTRAVENOUS at 08:27

## 2024-11-13 RX ADMIN — OXYCODONE HYDROCHLORIDE 5 MG: 5 TABLET ORAL at 14:06

## 2024-11-13 RX ADMIN — CLINDAMYCIN IN 5 PERCENT DEXTROSE 900 MG: 18 INJECTION, SOLUTION INTRAVENOUS at 07:48

## 2024-11-13 RX ADMIN — ROCURONIUM 10 MG: 50 INJECTION, SOLUTION INTRAVENOUS at 10:15

## 2024-11-13 RX ADMIN — HYDROMORPHONE HYDROCHLORIDE 0.5 MG: 1 INJECTION, SOLUTION INTRAMUSCULAR; INTRAVENOUS; SUBCUTANEOUS at 10:43

## 2024-11-13 RX ADMIN — LIDOCAINE HYDROCHLORIDE 100 MG: 20 INJECTION INTRAVENOUS at 07:39

## 2024-11-13 NOTE — DISCHARGE INSTRUCTIONS
[facility] - Gynecologic Oncology  Ida Gonzales [address] and [address]  [phone]    Hysterectomy Discharge Instructions    WHAT YOU NEED TO KNOW:   A hysterectomy is surgery to remove your uterus. Your ovaries, fallopian tubes, cervix, or part of your vagina may also need to be removed. The organs and tissue that will be removed depends on your medical condition.  After a hysterectomy, you will not be able to become pregnant.  If your ovaries are removed, you will go through menopause if you have not already.    DISCHARGE INSTRUCTIONS:   Contact your doctor at the number above if:   You have a fever over 101o.  You have nausea or are vomiting that does not improve after a light meal.   Your pain is getting worse, even after you take medicine.   You feel pain or burning when you urinate, or you have trouble urinating.   You have pus or a foul-smelling odor coming from your vagina.    Your wound is red, swollen, or draining pus.  You see new or an increased amount of bright red blood coming from your vagina or your incisions.   You have questions or concerns about your condition or care.    Seek care immediately:   Your arm or leg feels warm, tender, and painful. It may look swollen and red.  You have increasing abdominal or pelvic pain.   You have heavy vaginal bleeding that fills 1 or more sanitary pads in 1 hour.    Call 911 for any of the following:   You feel lightheaded, short of breath, and have chest pain.   You cough up blood.    Medicines: You may need any of the following:  Prescription medicine may be given. You may receive a prescription for pain medication or be advised to use over the counter (OTC) pain medication such as acetaminophen (Tylenol) or ibuprofen (Advil, Motrin). Ask your healthcare provider how to take this medicine safely.  NSAIDs , such as ibuprofen, help decrease swelling, pain, and fever. NSAIDs can cause stomach bleeding or kidney problems in certain  people. If you take blood thinner medicine, always ask your healthcare provider if NSAIDs are safe for you. Always read the medicine label and follow directions.   Stool softeners help treat or prevent constipation.    Take your medicine as directed. Contact your healthcare provider if you think your medicine is not helping or if you have side effects. Tell him or her if you are allergic to any medicine. Keep a list of the medicines, vitamins, and herbs you take. Include the amounts, and when and why you take them. Bring the list or the pill bottles to follow-up visits. Carry your medicine list with you in case of an emergency.    Activity:   Rest as needed. Get up and move around as directed to help prevent blood clots. Start with short walks and slowly increase the distance every day. Limit the number of times you climb stairs to 2 times each day for the first week. Plan most of your daily activities on one level of your home.      Do not lift objects heavier than 10 pounds for 6 weeks. Avoid strenuous activity for 2 weeks.      Do not strain during bowel movements. High-fiber foods and extra liquids can help you prevent constipation. Examples of high-fiber foods are fruit and bran. Prune juice and water are good liquids to drink.      Do not have sex, use tampons, or douche for up to 8 weeks.     You may shower as soon as the day after surgery.  Tub baths can be taken starting 2 weeks after surgery.Do not go into pools or hot tubs until cleared by your doctor.      Ask when it is safe for you to drive. It is generally safe to drive after 2 weeks and when no longer taking prescription pain medication.    Ask when you may return to work and to other regular activities.    Wound care: Care for your abdominal incisions as directed. Carefully wash around the wound with soap and water. If you have Hibiclens or medicated soap that you were instructed to use before surgery, you may use that to wash with for up to 2 days  after surgery.  If not, any mild non-scented, non-abrasive soap is safe.  It is okay to let the soap and water run over your incision. Do not scrub your incision. Dry the area and put on new, clean bandages as directed. Change your bandages when they get wet or dirty. If you have strips of medical tape, let them fall off on their own. It may take 7 to 14 days for them to fall off. Check your incision every day for redness, swelling, or pus.   Deep breathing: Take deep breaths and cough 10 times each hour. This will decrease your risk for a lung infection. Take a deep breath and hold it for as long as you can. Let the air out and then cough strongly. Deep breaths help open your airway. You may be given an incentive spirometer to help you take deep breaths. Put the plastic piece in your mouth and take a slow, deep breath, then let the air out and cough. Repeat these steps 10 times every hour.   Get support: This surgery may be life-changing for you and your family. You will no longer be able to get pregnant. Sudden changes in the levels of your hormones may occur and cause mood swings and depression. You may feel angry, sad, or frightened, or cry frequently and unexpectedly. These feelings are normal. Talk to your healthcare provider about where you can get support. You can also ask if hormone replacement medicine is right for you.   Follow up with your healthcare provider or gynecologist as directed: You may need to return to have stitches removed, and for other tests. Write down your questions so you remember to ask them during your visits.      © 2017 [employer] Information is for End User's use only and may not be sold, redistributed or otherwise used for commercial purposes. All illustrations and images included in CareNotes® are the copyrighted property of [employer] or [employer].  The above information is an  only. It is not intended as medical advice for  individual conditions or treatments. Talk to your doctor, nurse or pharmacist before following any medical regimen to see if it is safe and effective for you.

## 2024-11-13 NOTE — ANESTHESIA POSTPROCEDURE EVALUATION
Post-Op Assessment Note    CV Status:  Stable    Pain management: adequate       Mental Status:  Alert and awake   Hydration Status:  Euvolemic   PONV Controlled:  Controlled   Airway Patency:  Patent     Post Op Vitals Reviewed: Yes    No anethesia notable event occurred.    Staff: Anesthesiologist           Last Filed PACU Vitals:  Vitals Value Taken Time   Temp 98.4 °F (36.9 °C) 11/13/24 1115   Pulse 86 11/13/24 1307   /60 11/13/24 1302   Resp 18 11/13/24 1255   SpO2 96 % 11/13/24 1307   Vitals shown include unfiled device data.    Modified Yanira:  Activity: 2 (11/13/2024 12:55 PM)  Respiration: 2 (11/13/2024 12:55 PM)  Circulation: 2 (11/13/2024 12:55 PM)  Consciousness: 2 (11/13/2024 12:55 PM)  Oxygen Saturation: 2 (11/13/2024 12:55 PM)  Modified Yanira Score: 10 (11/13/2024 12:55 PM)

## 2024-11-13 NOTE — ANESTHESIA POSTPROCEDURE EVALUATION
Post-Op Assessment Note    CV Status:  Stable  Pain Score: 0    Pain management: adequate       Mental Status:  Sleepy and arousable   Hydration Status:  Euvolemic   PONV Controlled:  Controlled   Airway Patency:  Patent  Airway: intubated     Post Op Vitals Reviewed: Yes    No anethesia notable event occurred.    Staff: CRNA           Last Filed PACU Vitals:  Vitals Value Taken Time   Temp 97    Pulse 106 11/13/24 1117   /72    Resp 14    SpO2 93 % 11/13/24 1117   Vitals shown include unfiled device data.    Modified Yanira:  No data recorded

## 2024-11-13 NOTE — ANESTHESIA PREPROCEDURE EVALUATION
Procedure:  LTH, BSO, EUA (Abdomen)  EXPLORATORY LAPAROTOMY (Abdomen)    Relevant Problems   CARDIO   (+) Hypertension   (+) Mixed hyperlipidemia      GI/HEPATIC   (+) GERD (gastroesophageal reflux disease)   (+) Hepatic steatosis      PULMONARY   (+) Mild intermittent asthma      Orthopedic/Musculoskeletal   (+) Cervical spinal stenosis      Other   (+) Abnormal PFT   (+) Obesity (BMI 30.0-34.9)   (+) Prediabetes (A1C 5.4)    2021 PFTs:  FEV1/FVC Ratio: 86 %  Forced Vital Capacity: 3.19 L    84 % predicted  FEV1: 2.73 L92 % predicted  DLCO corrected for patients hemoglobin level: 65 %  Interpretation:  Normal Spirometry  Mildly reduced diffusion capacity  Flow volume loop is normal.    Physical Exam    Airway    Mallampati score: I  TM Distance: >3 FB       Dental   No notable dental hx     Cardiovascular  Rhythm: regular, Rate: normal    Pulmonary   Breath sounds clear to auscultation    Other Findings  Left upper lip cracking in SDS, lip moisturizer appliedpost-pubertal.      Anesthesia Plan  ASA Score- 3     Anesthesia Type- general with ASA Monitors.         Additional Monitors:     Airway Plan: ETT.    Comment: Ok for heparin in SDS, TAPS if open, 2nd PIV, confirmed transfusion OK if needed.       Plan Factors-Exercise tolerance (METS): >4 METS.    Chart reviewed.   Existing labs reviewed.     Patient is not a current smoker.      Obstructive sleep apnea risk education given perioperatively.        Induction- intravenous.    Postoperative Plan- Plan for postoperative opioid use.         Informed Consent- Anesthetic plan and risks discussed with patient.

## 2024-11-13 NOTE — H&P
Assessment/Plan:  59-year-old with a 3.9 x 3.8 cm left ovarian cyst with papillations, O rad category 4 incidentally identified during workup for left ureteral stone with left hydronephrosis.  The left ureteral stent has been removed.  I reviewed CT images of the abdomen pelvis from 10/13/2024.  I concur with the radiologist's opinion.  There is no evidence of peritoneal carcinomatosis, ascites, lymphadenopathy.  She presents for definitive surgical management.  Performance status is 0.  1.  Plan for examination under anesthesia, total laparoscopic hysterectomy, bilateral salpingo-oophorectomy, possible exploratory laparotomy, possible staging, all other indicated procedures.        CHIEF COMPLAINT: Here for surgery        Patient ID: Maria Luz Ponce is a 59 y.o. female  Who presents for surgery.  She was admitted briefly for syncopal episode and had a workup that included CT head, spine.  She also had a CT abdomen pelvis on 10/13/2024 which did not reveal any evidence of metastatic disease.  Her left ureteral stone has been removed and her stent was also removed.  No other interval change in medications or medical history since her last visit to the office.  She has no complaints today.        Review of Systems   Constitutional:  Negative for activity change and unexpected weight change.   HENT: Negative.     Eyes: Negative.    Respiratory: Negative.     Cardiovascular: Negative.    Gastrointestinal:  Negative for abdominal distention and abdominal pain.   Endocrine: Negative.    Genitourinary:  Negative for pelvic pain and vaginal bleeding.   Musculoskeletal: Negative.    Skin: Negative.    Allergic/Immunologic: Negative.    Neurological: Negative.    Hematological: Negative.    Psychiatric/Behavioral: Negative.         Current Facility-Administered Medications   Medication Dose Route Frequency Provider Last Rate Last Admin    acetaminophen (TYLENOL) tablet 975 mg  975 mg Oral Once Nahun Ricardo MD         clindamycin (CLEOCIN) IVPB (premix in dextrose) 900 mg 50 mL  900 mg Intravenous Once Nahun Ricardo MD        And    gentamicin (GARAMYCIN) 109.2 mg in sodium chloride 0.9 % 100 mL IVPB  1.5 mg/kg (Adjusted) Intravenous Once Nahun Ricardo MD        lactated ringers infusion  125 mL/hr Intravenous Continuous Nahun Ricardo MD        sodium chloride 0.9 % infusion  125 mL/hr Intravenous Continuous Sarthak Otoole  mL/hr at 11/13/24 0554 125 mL/hr at 11/13/24 0554       Allergies   Allergen Reactions    Amoxicillin-Pot Clavulanate Hives    Omnipaque [Iohexol] Vomiting    Penicillins        Past Medical History:   Diagnosis Date    Ankle fracture     Asthma     Cervical spinal stenosis     Closed left arm fracture, sequela     Concussion     Degeneration, intervertebral disc, cervical     Fibromyalgia, primary     Hypertension     Kidney stone     Migraine     Mixed hyperlipidemia 01/05/2021    Right arm fracture     Seasonal allergies     Shingles     Vitamin D deficiency        Past Surgical History:   Procedure Laterality Date    CARPAL TUNNEL RELEASE Bilateral     CATARACT EXTRACTION Bilateral     FL RETROGRADE PYELOGRAM  09/13/2024    FL RETROGRADE PYELOGRAM  10/25/2024    MOUTH SURGERY      DE COLONOSCOPY FLX DX W/COLLJ SPEC WHEN PFRMD N/A 12/27/2018    Procedure: COLONOSCOPY;  Surgeon: Bita Montalvo MD;  Location: [facility];  Service: Gastroenterology    DE CYSTO/URETERO W/LITHOTRIPSY &INDWELL STENT INSRT Left 09/13/2024    Procedure: CYSTOSCOPY URETEROSCOPY WITH RETROGRADE PYELOGRAM AND INSERTION STENT URETERAL;  Surgeon: Eder Johns MD;  Location: [facility];  Service: Urology    DE CYSTO/URETERO W/LITHOTRIPSY &INDWELL STENT INSRT Left 10/25/2024    Procedure: CYSTO USCOPE, RETROGRADE PYLEOGRAM WITH BASKET EXTRACTION OF STONE;  Surgeon: Eder Johns MD;  Location: [facility];  Service: Urology    SINUS SURGERY      TUBAL LIGATION         OB History        "   3    Para   3    Term   3            AB        Living             SAB        IAB        Ectopic        Multiple        Live Births   3                 Family History   Problem Relation Age of Onset    Heart disease Father     No Known Problems Daughter     No Known Problems Daughter     No Known Problems Daughter     No Known Problems Maternal Grandmother     No Known Problems Maternal Grandfather     No Known Problems Paternal Grandmother     No Known Problems Paternal Grandfather     Uterine cancer Maternal Aunt 75    Lung cancer Paternal Aunt         age unknown       The following portions of the patient's history were reviewed and updated as appropriate: allergies, current medications, past family history, past medical history, past social history, past surgical history, and problem list.      Objective:    Blood pressure 102/65, pulse 75, temperature (!) 97.2 °F (36.2 °C), temperature source Temporal, resp. rate 16, height 5' 5\" (1.651 m), weight 93.1 kg (205 lb 4 oz), last menstrual period 03/15/2019, SpO2 96%.  Body mass index is 34.16 kg/m².    Physical Exam  Vitals reviewed.   Constitutional:       General: She is not in acute distress.     Appearance: Normal appearance. She is not ill-appearing.   HENT:      Head: Normocephalic and atraumatic.      Mouth/Throat:      Mouth: Mucous membranes are moist.   Eyes:      General: No scleral icterus.        Right eye: No discharge.         Left eye: No discharge.      Conjunctiva/sclera: Conjunctivae normal.   Cardiovascular:      Rate and Rhythm: Normal rate and regular rhythm.      Heart sounds: Normal heart sounds.   Pulmonary:      Effort: Pulmonary effort is normal.      Breath sounds: Normal breath sounds.   Abdominal:      Palpations: Abdomen is soft.   Musculoskeletal:      Right lower leg: No edema.      Left lower leg: No edema.   Skin:     General: Skin is warm and dry.      Coloration: Skin is not jaundiced.      Findings: No rash. " "  Neurological:      General: No focal deficit present.      Mental Status: She is alert and oriented to person, place, and time.      Cranial Nerves: No cranial nerve deficit.      Sensory: No sensory deficit.      Motor: No weakness.      Gait: Gait normal.   Psychiatric:         Mood and Affect: Mood normal.         Behavior: Behavior normal.         Thought Content: Thought content normal.         Judgment: Judgment normal.           No results found for: \"\"  Lab Results   Component Value Date    WBC 6.27 10/24/2024    HGB 11.1 (L) 10/24/2024    HCT 34.5 (L) 10/24/2024    MCV 95 10/24/2024     10/24/2024     Lab Results   Component Value Date    K 4.2 10/24/2024     (H) 10/24/2024    CO2 22 10/24/2024    BUN 13 10/24/2024    CREATININE 1.05 10/24/2024    GLUCOSE 115 03/17/2021    GLUF 90 10/24/2024    CALCIUM 8.5 10/24/2024    AST 25 10/23/2024    ALT 30 10/23/2024    ALKPHOS 32 (L) 10/23/2024    EGFR 58 10/24/2024        Trend:  No results found for: \"\"      "

## 2024-11-13 NOTE — OP NOTE
OPERATIVE REPORT  PATIENT NAME: Maria Luz Ponce    :  1965  MRN: [id number]  Pt Location: [facility] OR ROOM 08    SURGERY DATE: 2024    Surgeons and Role:     * Nahun Ricardo MD - Primary     * Ignacia Almaraz MD - Assisting     * Dino Jorgensen MD - Fellow    Preop Diagnosis:  Cyst of left ovary [N83.202]    Post-Op Diagnosis Codes:     * Cyst of left ovary [N83.202]    Procedure(s):  TOTAL LAPAROSCOPIC HYSTERECTOMY.. BILATERAL SALPINGOOPHORECTOMY. CYSTOSCOPY WITH INSERTION URETERAL CATHETER    Specimen(s):  ID Type Source Tests Collected by Time Destination   1 : Pelvic Washings Washing Peritoneal Washings NON-GYNECOLOGIC CYTOLOGY Nahun Ricardo MD 2024 0818    2 : left tube and ovary for FROZEN Tissue Ovary, Left TISSUE EXAM Nahun Ricardo MD 2024 0912    3 : uterus, cervix, right tube and ovary Tissue Uterus TISSUE EXAM Nahun Ricardo MD 2024 0915        Estimated Blood Loss:   Minimal    Drains:  Ureteral Internal Stent Left ureter 6 Fr. (Active)   Number of days: 61       Ureteral Internal Stent Left ureter 6 Fr. (Active)   Number of days: 19       [REMOVED] Urethral Catheter 16 Fr. (Removed)   Number of days: 0       Anesthesia Type:   General    Operative Indications:  Cyst of left ovary [N83.202]    59 y.o. with a 3.9 x 3.8 cm left ovarian cyst with papillations, O rad category 4 incidentally identified during workup for left ureteral stone with left hydronephrosis who presents for definitive surgical management.     Operative Findings:  Exam under anesthesia with mobile uterus.  Laparoscopy with normal appearing liver edge, bowels, omentum. Sigmoid colon was redundant and prolapsing into the pelvis despite steep Trendelenberg. Normal appearing uterus, bilateral fallopian tubes and ovaries. Left simple appearing paratubal cyst noted. Inflammatory changes were noted in the vesicouterine peritoneum rendering the dissection more  challenging.  Cystoscopy with no evidence of suture and bilateral ureteral efflux noted. A left open ended stent was placed without resistance to 10cm.   Intraoperative frozen section was benign.      Complications:   Inherent cautery injury to sigmoid colon due to redundant colon prolapsing into pelvis, over sewn with 3 interrupted 3-0 Vicryl sutures.     Procedure and Technique:  The patient was met in the pre-operative holding area where consents and procedures were reviewed and all questions answered.  She was brought to the Operating Room where general anesthesia was induced. The abdomen, vagina and perineum were prepped and draped. Atraumatic vaginal retractors were placed to identify the cervix and complete the examination. The cervix was grasped with a single tooth tenaculum. The cervix was easily dilated and the uterus sounded to 8 cm. A small Koh ring was placed around the cervix.  Then a 6cm OLIVER cannula was placed in the uterine cavity without difficulty. A Austin catheter was placed in a sterile fashion.     A 5mm incision was made 2cm above the umbilicus and the laparoscope was directly introduced into the abdomen through the optiview trocar. The peritoneal cavity was then insufflated with carbon dioxide on high flow to maintain a pressure of 15 mm Hg throughout the case.  Visualization confirmed atraumatic entry. An abdominal survey was performed. Additional ports were placed under direct visualization with a 5mm airseal in the right and 5mm trocar in the left lower quadrants. 0.25% marcaine was injected prior to each incision.     Procedure was completed in identical fashion bilaterally unless otherwise stated. The ureters were then identified transperitoneally, coursing well posterior to the ovarian vessels. On the left side, the Enseal device was used to cauterize the IP ligament, noting the course of the ureter well below the level of the IP ligament. The retroperitoneum was entered on the right  side and a window made above the ureter to skeletonize the right IP ligament. Enseal device was used to cauterize and divide the ovarian vessels. Posteriorly, the fallopian tube and ovary was skeletonized and posterior peritoneum was dropped. Anteriorly, the vesico-uterine peritoneum was divided, and the bladder carefully dissected from the cervix and upper vagina, to below the cervical-vaginal junction. The uterine vessels were then skeletonized bilaterally and coagulated at the level of the uterine isthmus  then divided. On both sides, the cardinal ligaments were then coagulated using the enseal device and divided. With the bladder mobilized anteriorally and the rectum well away posteriorally, using the monopolar spatula device, an incision was made in the anterior upper vagina at the level of the cervical-vaginal junction. The incision was continued circumferentially around the upper vagina. This allowed complete amputation of the specimen. The uterus, cervix, and bilateral fallopian tubes and ovaries were then removed en bloc transvaginally.  An inherent cautery injury using the spatula was made along the sigmoid colon along the left epiploia as the redundant sigmoid was prolapsing down into the pelvis.      The upper vagina was then closed laparoscopically with 2-0 Stratafix. A vessel along the left anterior vaginal cuff had ongoing bleeding and therefore an additional 2-0 Stratafix figure-of-eight suture was utilized to obtain hemostasis. The upper vagina was intact and hemostatic. 3-0 Vicryl was utilized to oversew the pinpoint area of cautery injury in an interrupted fashion. 3 interrupted sutures were utilized, imbricating the colon over the area of injury. The pelvis was irrigated again and hemostasis noted. The bladder was backfilled with 300cc normal saline under direct visualization and continued hemostasis was noted. The laparoscopic instruments were then removed.     A cystourethroscopy was performed  using a 30degree cystoscope with intact bladder mucosa, no gross lesions and bilateral ureteral efflux. Due to an initially sluggish left ureteral jet, a 5 Fr open ended ureteral catheter was placed within the left ureteral orifice to 10cm without resistance. Bilateral efflux was again noted after stent removal.      The laparoscopic skin incisions were irrigated and noted to be hemostatic. They skin incisions were closed with subcuticular stitches of 4-0 monocryl. The vagina was inspected with an intact vaginal cuff and no evidence of ongoing bleeding or lacerations.     All sponge, needle and instrument counts were correct x2.  Anesthesia was reversed and the patient was taken to the PACU in a stable condition.     Dr. Ricardo was present and scrubbed for the entirety of the case.     Patient Disposition:  PACU       SIGNATURE: Dino Jorgensen MD  DATE: November 13, 2024  TIME: 11:23 AM

## 2024-11-19 PROCEDURE — 88307 TISSUE EXAM BY PATHOLOGIST: CPT | Performed by: STUDENT IN AN ORGANIZED HEALTH CARE EDUCATION/TRAINING PROGRAM

## 2024-11-19 PROCEDURE — 88342 IMHCHEM/IMCYTCHM 1ST ANTB: CPT | Performed by: STUDENT IN AN ORGANIZED HEALTH CARE EDUCATION/TRAINING PROGRAM

## 2024-11-19 PROCEDURE — 88360 TUMOR IMMUNOHISTOCHEM/MANUAL: CPT | Performed by: STUDENT IN AN ORGANIZED HEALTH CARE EDUCATION/TRAINING PROGRAM

## 2024-11-20 PROCEDURE — 88112 CYTOPATH CELL ENHANCE TECH: CPT | Performed by: STUDENT IN AN ORGANIZED HEALTH CARE EDUCATION/TRAINING PROGRAM

## 2024-11-20 PROCEDURE — 88305 TISSUE EXAM BY PATHOLOGIST: CPT | Performed by: STUDENT IN AN ORGANIZED HEALTH CARE EDUCATION/TRAINING PROGRAM

## 2024-11-21 ENCOUNTER — CLINICAL SUPPORT (OUTPATIENT)
Dept: BARIATRICS | Facility: CLINIC | Age: 59
End: 2024-11-21

## 2024-11-21 VITALS — BODY MASS INDEX: 33.76 KG/M2 | HEIGHT: 65 IN | WEIGHT: 202.6 LBS

## 2024-11-21 DIAGNOSIS — R63.5 ABNORMAL WEIGHT GAIN: Primary | ICD-10-CM

## 2024-11-21 PROCEDURE — RECHECK

## 2024-11-22 PROBLEM — N39.0 URINARY TRACT INFECTION: Status: RESOLVED | Noted: 2024-10-23 | Resolved: 2024-11-22

## 2024-11-25 ENCOUNTER — CLINICAL SUPPORT (OUTPATIENT)
Dept: BARIATRICS | Facility: CLINIC | Age: 59
End: 2024-11-25

## 2024-11-25 VITALS — WEIGHT: 199.6 LBS | BODY MASS INDEX: 33.26 KG/M2 | HEIGHT: 65 IN

## 2024-11-25 DIAGNOSIS — E66.811 CLASS 1 OBESITY DUE TO EXCESS CALORIES WITHOUT SERIOUS COMORBIDITY WITH BODY MASS INDEX (BMI) OF 33.0 TO 33.9 IN ADULT: Primary | ICD-10-CM

## 2024-11-25 DIAGNOSIS — E66.09 CLASS 1 OBESITY DUE TO EXCESS CALORIES WITHOUT SERIOUS COMORBIDITY WITH BODY MASS INDEX (BMI) OF 33.0 TO 33.9 IN ADULT: Primary | ICD-10-CM

## 2024-11-25 PROCEDURE — RECHECK

## 2024-11-25 NOTE — PROGRESS NOTES
"Weight Management Medical Nutrition Assessment      Maria Luz presented for 3 month Healthy CORE Program check in.  Today's weight is 199.6lb. Weight loss of 14.5# since last appointment with RD for Month 2 HC; 28.4# since HC. She has met her goal of going under 200# as of today!  She is doing well on weight loss and will keep in mind her fluid intake, protein intake, and recognizing its okay to have fun or convenient foods, as long as they provide her with adequate protein/nourishment.     Completed a body composition using SECA scale and reviewed results with patient.   Reevue indirect calorimter revealed REE is    normal      compared to the predictive normal for someone her same age, height, and gender.    Reevue Indirect Calorimeter REE:      1541      Weight loss without exercise:  1233     Weight loss with exercise:   5679-4625   Maintenance:  6516-8643    Overall improvement from 7/30-11/25  Weight loss: 28.4#   Waist Circumference: -2\"   Fat Mass: -16#  FM %: -1%  FFM: 13.5#  SMM: -8.6# (of the 13.5# lost)  REE: -106 calories per day  Visceral Fat Rating: -0.4  BMI: -4.8 pts     Patient seen by Medical Provider in past 6 months:  yes  Requested to schedule appointment with Medical Provider: Yes      Anthropometric Measurements  Start Weight (#): 231 (with provider); 228# starting HC   Current Weight (#): 214  TBW % Change from start weight:14%  Ideal Body Weight (#):120  Goal Weight (#):140-150#; 200# Under   Highest:Current   Lowest:200     Weight Loss History  Previous weight loss attempts: Commercial Programs (Weight Watchers, Impraise, etc.)  Self Created Diets (Portion Control, Healthy Food Choices, etc.)    Food and Nutrition Related History  Wake up: 515/530AM; 630/7; random    Bed Time:9/10PM     Food Recall  Breakfast:tea, banana or a muffin if I dont eat on the way to work may get eggs (scrambled), sausage, and orange juice;tea with sugar (2 tsp) and dariela      Snack:sometimes Miriam Nascimento cookies " (portioned) 100 calorie pack  Lunch:deli sandwich like ham and cheese with whole wheat bread LTMP, salad bar (field greens/paula, tomato, cucus, shredded cheddar, olives, cold pasta like macaroni salad, red beets, eggs, dressing (varies), sunflower seeds; pasta bar, sometimes its PB&J, iced tea (Pure Leaf)   Snack:Pepsi, Cheezeitz (portioned bag) OR potato chips (snack bag); sweet treat like an ice sandwich  Dinner:Diner, Chipotle, chick-alejandro-a (Arabic toast, soup and salad bar, Grilled cheese with fries, gyro, pasta), chicken sandwich or 3 strip meal (sans fries)  Snack:-      Beverages: water, 1% milk, juice, regular soda, and coffee/tea  Volume of beverage intake: 0 water intake; however, fluid amount is appropriate     Weekends: Same  Cravings: Varies   Trouble area of day:Home     Frequency of Eating out: daily  Food restrictions:No   Cooking: self   Food Shopping: self    Physical Activity Intake  Activity:Biking  Frequency:rarely  Physical limitations/barriers to exercise: Hips, knees, back     Estimated Needs  Energy  SECA: BMR:1687      X 1.3 -1000 = 1193  [facility] Energy Needs: BMR :1615    1-2# loss weekly sedentary:  938-1438             1-2# loss weekly lightly active:3991-8095  Maintenance calories for sedentary activity level: 1938  Protein:73-92      (1.2-1.5g/kg IBW)  Fluid: 64     (35mL/kg IBW)    Nutrition Diagnosis  Yes;    Excessive energy intake  related to Food and nutrition related knowledge deficit concerning energy intake as evidenced by  BMI >25 for adults       Nutrition Intervention    Nutrition Prescription  Calories:2903-7917  Protein:73-92  Fluid:72    Meal Plan (Rico/Pro/Carb)  Breakfast: 400 rico (25g PRO)  Snack: 150-200 rico (10g PRO)  Lunch: 500 rico (30g PRO)  Snack: 150-200 rico (10g PRO)  Dinner: 500-600 rico (30g PRO)  Snack: -    Nutrition Education:    Healthy Core Manual  Calorie controlled menu  Lean protein food choices  Healthy snack options  Food journaling  tips      Nutrition Counseling:  Strategies: meal planning, portion sizes, healthy snack choices, hydration, fiber intake, protein intake, exercise, food journal      Monitoring and Evaluation:  Evaluation criteria:  Energy Intake  Meet protein needs  Maintain adequate hydration  Monitor weekly weight  Meal planning/preparation  Food journal   Decreased portions at mealtimes and snacks  Physical activity     Barriers to learning:none  Readiness to change: Action  Comprehension: very good  Expected Compliance: very good

## 2024-11-26 ENCOUNTER — CLINICAL SUPPORT (OUTPATIENT)
Dept: BARIATRICS | Facility: CLINIC | Age: 59
End: 2024-11-26

## 2024-11-26 VITALS — BODY MASS INDEX: 33.55 KG/M2 | HEIGHT: 65 IN | WEIGHT: 201.4 LBS

## 2024-11-26 DIAGNOSIS — R63.5 ABNORMAL WEIGHT GAIN: Primary | ICD-10-CM

## 2024-11-26 PROCEDURE — RECHECK

## 2024-12-01 DIAGNOSIS — E66.812 CLASS 2 SEVERE OBESITY DUE TO EXCESS CALORIES WITH SERIOUS COMORBIDITY AND BODY MASS INDEX (BMI) OF 37.0 TO 37.9 IN ADULT (HCC): ICD-10-CM

## 2024-12-01 DIAGNOSIS — E66.01 CLASS 2 SEVERE OBESITY DUE TO EXCESS CALORIES WITH SERIOUS COMORBIDITY AND BODY MASS INDEX (BMI) OF 37.0 TO 37.9 IN ADULT (HCC): ICD-10-CM

## 2024-12-03 RX ORDER — TIRZEPATIDE 5 MG/.5ML
5 INJECTION, SOLUTION SUBCUTANEOUS WEEKLY
Qty: 2 ML | Refills: 2 | Status: SHIPPED | OUTPATIENT
Start: 2024-12-03 | End: 2024-12-06 | Stop reason: SDUPTHER

## 2024-12-06 DIAGNOSIS — E66.01 CLASS 2 SEVERE OBESITY DUE TO EXCESS CALORIES WITH SERIOUS COMORBIDITY AND BODY MASS INDEX (BMI) OF 37.0 TO 37.9 IN ADULT (HCC): ICD-10-CM

## 2024-12-06 DIAGNOSIS — E66.812 CLASS 2 SEVERE OBESITY DUE TO EXCESS CALORIES WITH SERIOUS COMORBIDITY AND BODY MASS INDEX (BMI) OF 37.0 TO 37.9 IN ADULT (HCC): ICD-10-CM

## 2024-12-06 RX ORDER — TIRZEPATIDE 5 MG/.5ML
5 INJECTION, SOLUTION SUBCUTANEOUS WEEKLY
Qty: 6 ML | Refills: 0 | Status: SHIPPED | OUTPATIENT
Start: 2024-12-06 | End: 2025-01-31

## 2024-12-10 ENCOUNTER — OFFICE VISIT (OUTPATIENT)
Dept: GYNECOLOGIC ONCOLOGY | Facility: CLINIC | Age: 59
End: 2024-12-10

## 2024-12-10 ENCOUNTER — TELEPHONE (OUTPATIENT)
Dept: BARIATRICS | Facility: CLINIC | Age: 59
End: 2024-12-10

## 2024-12-10 VITALS
BODY MASS INDEX: 33.32 KG/M2 | HEIGHT: 65 IN | HEART RATE: 97 BPM | WEIGHT: 200 LBS | OXYGEN SATURATION: 98 % | DIASTOLIC BLOOD PRESSURE: 76 MMHG | SYSTOLIC BLOOD PRESSURE: 124 MMHG | TEMPERATURE: 97.3 F

## 2024-12-10 DIAGNOSIS — N83.202 CYST OF LEFT OVARY: Primary | ICD-10-CM

## 2024-12-10 PROCEDURE — 99024 POSTOP FOLLOW-UP VISIT: CPT | Performed by: OBSTETRICS & GYNECOLOGY

## 2024-12-10 RX ORDER — LIDOCAINE 50 MG/G
PATCH TOPICAL
COMMUNITY

## 2024-12-10 RX ORDER — ONDANSETRON 4 MG/1
TABLET, FILM COATED ORAL
COMMUNITY

## 2024-12-10 NOTE — TELEPHONE ENCOUNTER
Zuleima to confirm what ins pt has in order to determine what the price will be for the 12/24/24 menu planning appt. If pt has the [employer] through [employer]'s it will be $15 and she will need to provide us with that ins info and if she doesn't have [employer] ins then the mandujano will be the $40.  .

## 2024-12-10 NOTE — ASSESSMENT & PLAN NOTE
59-year-old status post total laparoscopic hysterectomy, bilateral salpingo-oophorectomy, cystoscopy for an O rad category 4 left ovarian cyst.  Final pathology was benign.  There is no evidence of hyperplasia, dysplasia, malignancy.  The ovarian cyst was a serous cystadenoma.  She is recovering well from surgery.  Her performance status is 0.  1.  We reviewed ongoing activity limitations  2.  She was encouraged to call the office with any additional questions or concerns.

## 2024-12-10 NOTE — LETTER
December 10, 2024     Nohelia Whitman MD  [address]  [address]  [place] [address]    Patient: Maria Luz Pocne   YOB: 1965   Date of Visit: 12/10/2024       Dear Dr. Whitman:    Thank you for referring Maria Luz Ponce to me for evaluation. Below are my notes for this consultation.    If you have questions, please do not hesitate to call me. I look forward to following your patient along with you.         Sincerely,        Nahun Ricardo MD        CC: MD Nahun Jean Baptiste MD  12/10/2024 10:51 AM  Sign when Signing Visit  Name: Maria Luz Ponce      : 1965      MRN: [id number]  Encounter Provider: Nahun Ricardo MD  Encounter Date: 12/10/2024   Encounter department: [facility]  :  Assessment & Plan  Cyst of left ovary  59-year-old status post total laparoscopic hysterectomy, bilateral salpingo-oophorectomy, cystoscopy for an O rad category 4 left ovarian cyst.  Final pathology was benign.  There is no evidence of hyperplasia, dysplasia, malignancy.  The ovarian cyst was a serous cystadenoma.  She is recovering well from surgery.  Her performance status is 0.  1.  We reviewed ongoing activity limitations  2.  She was encouraged to call the office with any additional questions or concerns.               History of Present Illness  Reason for Visit / CC: Postoperative evaluation   Maria Luz Ponce is a 59 y.o. female   Who returns for postoperative evaluation.  She is ambulating, voiding, having bowel movements.  She does not require pain medication.  She has occasional vaginal spotting.  No other interval change in medications or medical history since her surgery.      Pertinent Medical History        Review of Systems A complete review of systems is negative other than that noted above in the HPI.  Current Outpatient Medications on File Prior to Visit   Medication Sig Dispense Refill   • acetaminophen  (TYLENOL) 500 mg tablet Take 1,000 mg by mouth every 6 (six) hours as needed for mild pain     • albuterol (PROVENTIL HFA,VENTOLIN HFA) 90 mcg/act inhaler Inhale 2 puffs if needed     • Cholecalciferol (VITAMIN D3) 3000 units TABS Take 3,000 Units by mouth daily     • Diclofenac Sodium (VOLTAREN) 1 % Apply 2 g topically if needed     • dicyclomine (BENTYL) 10 mg capsule TAKE 1 CAPSULE (10 MG TOTAL) BY MOUTH 3 (THREE) TIMES A DAY AS NEEDED (DIARRHEA AND ABDOMINAL PAIN) 300 capsule 1   • DULoxetine (CYMBALTA) 60 mg delayed release capsule Take 60 mg by mouth daily     • famotidine (PEPCID) 10 mg tablet Take 10 mg by mouth in the morning     • fexofenadine (ALLEGRA) 180 MG tablet Take 180 mg by mouth as needed     • fluticasone (FLONASE) 50 mcg/act nasal spray SPRAY 2 SPRAYS INTO EACH NOSTRIL 2 TIMES A DAY (Patient taking differently: 2 sprays into each nostril as needed) 96 mL 2   • fluticasone-salmeterol (ADVAIR, WIXELA) 100-50 mcg/dose inhaler Inhale 1 puff 2 (two) times a day     • gabapentin (NEURONTIN) 100 mg capsule Take 200 mg by mouth daily at bedtime     • lidocaine (LIDODERM) 5 % APPLY TO AREA OF PAIN FOR UP TO 12 HOURS PER DAY, MAY CUT TO SIZE NEEDED     • melatonin 3 mg Take 3 mg by mouth daily at bedtime     • meloxicam (MOBIC) 15 mg tablet Take 15 mg by mouth daily     • metaxalone (SKELAXIN) 800 mg tablet Take 800 mg by mouth 3 (three) times a day     • methocarbamol (ROBAXIN) 750 mg tablet Take 750 mg by mouth if needed     • Omega-3 1000 MG CAPS Take 1 capsule by mouth daily     • ondansetron (ZOFRAN) 4 mg tablet      • Probiotic Product (PROBIOTIC BLEND PO) Take 1 capsule by mouth in the morning     • TART CHERRY PO Take 2 capsules by mouth 2 (two) times a day     • tirzepatide (Zepbound) 5 mg/0.5 mL auto-injector Inject 0.5 mL (5 mg total) under the skin once a week 6 mL 0   • valsartan (DIOVAN) 160 mg tablet Take 160 mg by mouth daily     • ipratropium (ATROVENT) 0.06 % nasal spray SPRAY 2 SPRAYS  "INTO EACH NOSTRIL 3 TIMES A DAY. (Patient taking differently: 1 spray into each nostril as needed) 45 mL 3   • oxybutynin (DITROPAN-XL) 5 mg 24 hr tablet Take 1 tablet (5 mg total) by mouth daily 5 tablet 0   • phenazopyridine (PYRIDIUM) 200 mg tablet Take 1 tablet (200 mg total) by mouth 3 (three) times a day with meals 10 tablet 0   • sodium chloride (OCEAN) 0.65 % nasal spray 2 sprays into each nostril every 2 (two) hours while awake (Patient taking differently: 2 sprays into each nostril as needed) 30 mL 2   • tamsulosin (FLOMAX) 0.4 mg Take 1 capsule (0.4 mg total) by mouth daily with dinner 7 capsule 0   • traMADol (ULTRAM) 50 mg tablet Take 50 mg by mouth every 6 (six) hours as needed for moderate pain       No current facility-administered medications on file prior to visit.         Objective  /76   Pulse 97   Temp (!) 97.3 °F (36.3 °C)   Ht 5' 5\" (1.651 m)   Wt 90.7 kg (200 lb)   LMP 03/15/2019 (Exact Date)   SpO2 98%   BMI 33.28 kg/m²     Body mass index is 33.28 kg/m².  ECOG   0  Physical Exam  Vitals reviewed. Exam conducted with a chaperone present.   Constitutional:       General: She is not in acute distress.     Appearance: Normal appearance.   HENT:      Head: Normocephalic and atraumatic.      Mouth/Throat:      Mouth: Mucous membranes are moist.   Abdominal:      Palpations: Abdomen is soft. There is no mass.      Tenderness: There is no abdominal tenderness.   Genitourinary:     Comments: The external female genitalia is normal. The bartholin's, uretheral and skenes glands are normal. The urethral meatus is normal (midline with no lesions). Anus without fissure or lesion. Speculum exam reveals a grossly normal vagina.  There is a small amount of dark blood present at the vaginal apex.  No active bleeding identified.  Vagina is intact, without dehiscense. No masses, lesions, discharge or bleeding. No significant cystocele or rectocele noted. Bimanual exam notes a surgical absent " "cervix, uterus and adnexal structures.  There is mild inflammatory change of the apex.  No masses or fullness. Bladder is without fullness, mass or tenderness.  Musculoskeletal:      Right lower leg: No edema.      Left lower leg: No edema.   Skin:     General: Skin is warm and dry.      Comments: Surgical trocar sites are well healed.    Neurological:      Mental Status: She is alert and oriented to person, place, and time.   Psychiatric:         Mood and Affect: Mood normal.         Behavior: Behavior normal.         Thought Content: Thought content normal.         Judgment: Judgment normal.          Labs: I have reviewed pertinent labs.   No results found for: \"\"  Lab Results   Component Value Date/Time    Potassium 4.2 10/24/2024 06:05 AM    Potassium 4.3 03/16/2024 08:41 AM    Chloride 111 (H) 10/24/2024 06:05 AM    Chloride 104 03/16/2024 08:41 AM    Carbon Dioxide 26 03/16/2024 08:41 AM    CO2 22 10/24/2024 06:05 AM    BUN 13 10/24/2024 06:05 AM    BUN 13 03/16/2024 08:41 AM    Creatinine 1.05 10/24/2024 06:05 AM    Creatinine 0.90 03/16/2024 08:41 AM    Glucose, Fasting 90 10/24/2024 06:05 AM    Calcium 8.5 10/24/2024 06:05 AM    Calcium 9.3 03/16/2024 08:41 AM    AST 25 10/23/2024 08:13 AM    AST 45 (H) 03/16/2024 08:41 AM    ALT 30 10/23/2024 08:13 AM    ALT 72 (H) 03/16/2024 08:41 AM    Alkaline Phosphatase 32 (L) 10/23/2024 08:13 AM    Alkaline Phosphatase 36 03/16/2024 08:41 AM    eGFRcr 74 03/16/2024 08:41 AM    eGFR 58 10/24/2024 06:05 AM     Lab Results   Component Value Date/Time    WBC 6.27 10/24/2024 06:05 AM    Hemoglobin 11.1 (L) 10/24/2024 06:05 AM    Hematocrit 34.5 (L) 10/24/2024 06:05 AM    MCV 95 10/24/2024 06:05 AM    Platelets 222 10/24/2024 06:05 AM     Lab Results   Component Value Date/Time    Absolute Neutrophils 2.47 10/23/2024 08:13 AM        Trend:  No results found for: \"\"      Other Study Results Review : Pathology reports reviewed.      "

## 2024-12-10 NOTE — PROGRESS NOTES
Name: Maria Luz Ponce      : 1965      MRN: [id number]  Encounter Provider: Nahun Ricardo MD  Encounter Date: 12/10/2024   [facility]  :  Assessment & Plan  Cyst of left ovary  59-year-old status post total laparoscopic hysterectomy, bilateral salpingo-oophorectomy, cystoscopy for an O rad category 4 left ovarian cyst.  Final pathology was benign.  There is no evidence of hyperplasia, dysplasia, malignancy.  The ovarian cyst was a serous cystadenoma.  She is recovering well from surgery.  Her performance status is 0.  1.  We reviewed ongoing activity limitations  2.  She was encouraged to call the office with any additional questions or concerns.               History of Present Illness   Reason for Visit / CC: Postoperative evaluation   Maria Luz Ponce is a 59 y.o. female   Who returns for postoperative evaluation.  She is ambulating, voiding, having bowel movements.  She does not require pain medication.  She has occasional vaginal spotting.  No other interval change in medications or medical history since her surgery.      Pertinent Medical History         Review of Systems A complete review of systems is negative other than that noted above in the HPI.  Current Outpatient Medications on File Prior to Visit   Medication Sig Dispense Refill    acetaminophen (TYLENOL) 500 mg tablet Take 1,000 mg by mouth every 6 (six) hours as needed for mild pain      albuterol (PROVENTIL HFA,VENTOLIN HFA) 90 mcg/act inhaler Inhale 2 puffs if needed      Cholecalciferol (VITAMIN D3) 3000 units TABS Take 3,000 Units by mouth daily      Diclofenac Sodium (VOLTAREN) 1 % Apply 2 g topically if needed      dicyclomine (BENTYL) 10 mg capsule TAKE 1 CAPSULE (10 MG TOTAL) BY MOUTH 3 (THREE) TIMES A DAY AS NEEDED (DIARRHEA AND ABDOMINAL PAIN) 300 capsule 1    DULoxetine (CYMBALTA) 60 mg delayed release capsule Take 60 mg by mouth daily      famotidine (PEPCID)  10 mg tablet Take 10 mg by mouth in the morning      fexofenadine (ALLEGRA) 180 MG tablet Take 180 mg by mouth as needed      fluticasone (FLONASE) 50 mcg/act nasal spray SPRAY 2 SPRAYS INTO EACH NOSTRIL 2 TIMES A DAY (Patient taking differently: 2 sprays into each nostril as needed) 96 mL 2    fluticasone-salmeterol (ADVAIR, WIXELA) 100-50 mcg/dose inhaler Inhale 1 puff 2 (two) times a day      gabapentin (NEURONTIN) 100 mg capsule Take 200 mg by mouth daily at bedtime      lidocaine (LIDODERM) 5 % APPLY TO AREA OF PAIN FOR UP TO 12 HOURS PER DAY, MAY CUT TO SIZE NEEDED      melatonin 3 mg Take 3 mg by mouth daily at bedtime      meloxicam (MOBIC) 15 mg tablet Take 15 mg by mouth daily      metaxalone (SKELAXIN) 800 mg tablet Take 800 mg by mouth 3 (three) times a day      methocarbamol (ROBAXIN) 750 mg tablet Take 750 mg by mouth if needed      Omega-3 1000 MG CAPS Take 1 capsule by mouth daily      ondansetron (ZOFRAN) 4 mg tablet       Probiotic Product (PROBIOTIC BLEND PO) Take 1 capsule by mouth in the morning      TART CHERRY PO Take 2 capsules by mouth 2 (two) times a day      tirzepatide (Zepbound) 5 mg/0.5 mL auto-injector Inject 0.5 mL (5 mg total) under the skin once a week 6 mL 0    valsartan (DIOVAN) 160 mg tablet Take 160 mg by mouth daily      ipratropium (ATROVENT) 0.06 % nasal spray SPRAY 2 SPRAYS INTO EACH NOSTRIL 3 TIMES A DAY. (Patient taking differently: 1 spray into each nostril as needed) 45 mL 3    oxybutynin (DITROPAN-XL) 5 mg 24 hr tablet Take 1 tablet (5 mg total) by mouth daily 5 tablet 0    phenazopyridine (PYRIDIUM) 200 mg tablet Take 1 tablet (200 mg total) by mouth 3 (three) times a day with meals 10 tablet 0    sodium chloride (OCEAN) 0.65 % nasal spray 2 sprays into each nostril every 2 (two) hours while awake (Patient taking differently: 2 sprays into each nostril as needed) 30 mL 2    tamsulosin (FLOMAX) 0.4 mg Take 1 capsule (0.4 mg total) by mouth daily with dinner 7 capsule 0  "   traMADol (ULTRAM) 50 mg tablet Take 50 mg by mouth every 6 (six) hours as needed for moderate pain       No current facility-administered medications on file prior to visit.         Objective   /76   Pulse 97   Temp (!) 97.3 °F (36.3 °C)   Ht 5' 5\" (1.651 m)   Wt 90.7 kg (200 lb)   LMP 03/15/2019 (Exact Date)   SpO2 98%   BMI 33.28 kg/m²     Body mass index is 33.28 kg/m².  ECOG   0  Physical Exam  Vitals reviewed. Exam conducted with a chaperone present.   Constitutional:       General: She is not in acute distress.     Appearance: Normal appearance.   HENT:      Head: Normocephalic and atraumatic.      Mouth/Throat:      Mouth: Mucous membranes are moist.   Abdominal:      Palpations: Abdomen is soft. There is no mass.      Tenderness: There is no abdominal tenderness.   Genitourinary:     Comments: The external female genitalia is normal. The bartholin's, uretheral and skenes glands are normal. The urethral meatus is normal (midline with no lesions). Anus without fissure or lesion. Speculum exam reveals a grossly normal vagina.  There is a small amount of dark blood present at the vaginal apex.  No active bleeding identified.  Vagina is intact, without dehiscense. No masses, lesions, discharge or bleeding. No significant cystocele or rectocele noted. Bimanual exam notes a surgical absent cervix, uterus and adnexal structures.  There is mild inflammatory change of the apex.  No masses or fullness. Bladder is without fullness, mass or tenderness.  Musculoskeletal:      Right lower leg: No edema.      Left lower leg: No edema.   Skin:     General: Skin is warm and dry.      Comments: Surgical trocar sites are well healed.    Neurological:      Mental Status: She is alert and oriented to person, place, and time.   Psychiatric:         Mood and Affect: Mood normal.         Behavior: Behavior normal.         Thought Content: Thought content normal.         Judgment: Judgment normal.          Labs: I " "have reviewed pertinent labs.   No results found for: \"\"  Lab Results   Component Value Date/Time    Potassium 4.2 10/24/2024 06:05 AM    Potassium 4.3 03/16/2024 08:41 AM    Chloride 111 (H) 10/24/2024 06:05 AM    Chloride 104 03/16/2024 08:41 AM    Carbon Dioxide 26 03/16/2024 08:41 AM    CO2 22 10/24/2024 06:05 AM    BUN 13 10/24/2024 06:05 AM    BUN 13 03/16/2024 08:41 AM    Creatinine 1.05 10/24/2024 06:05 AM    Creatinine 0.90 03/16/2024 08:41 AM    Glucose, Fasting 90 10/24/2024 06:05 AM    Calcium 8.5 10/24/2024 06:05 AM    Calcium 9.3 03/16/2024 08:41 AM    AST 25 10/23/2024 08:13 AM    AST 45 (H) 03/16/2024 08:41 AM    ALT 30 10/23/2024 08:13 AM    ALT 72 (H) 03/16/2024 08:41 AM    Alkaline Phosphatase 32 (L) 10/23/2024 08:13 AM    Alkaline Phosphatase 36 03/16/2024 08:41 AM    eGFRcr 74 03/16/2024 08:41 AM    eGFR 58 10/24/2024 06:05 AM     Lab Results   Component Value Date/Time    WBC 6.27 10/24/2024 06:05 AM    Hemoglobin 11.1 (L) 10/24/2024 06:05 AM    Hematocrit 34.5 (L) 10/24/2024 06:05 AM    MCV 95 10/24/2024 06:05 AM    Platelets 222 10/24/2024 06:05 AM     Lab Results   Component Value Date/Time    Absolute Neutrophils 2.47 10/23/2024 08:13 AM        Trend:  No results found for: \"\"      Other Study Results Review : Pathology reports reviewed.      "

## 2024-12-12 ENCOUNTER — HOSPITAL ENCOUNTER (EMERGENCY)
Facility: HOSPITAL | Age: 59
Discharge: HOME | End: 2024-12-12
Attending: EMERGENCY MEDICINE
Payer: COMMERCIAL

## 2024-12-12 ENCOUNTER — APPOINTMENT (EMERGENCY)
Dept: RADIOLOGY | Facility: HOSPITAL | Age: 59
End: 2024-12-12
Payer: COMMERCIAL

## 2024-12-12 VITALS
DIASTOLIC BLOOD PRESSURE: 61 MMHG | SYSTOLIC BLOOD PRESSURE: 126 MMHG | WEIGHT: 200.18 LBS | OXYGEN SATURATION: 98 % | RESPIRATION RATE: 15 BRPM | HEART RATE: 97 BPM | TEMPERATURE: 98.2 F | BODY MASS INDEX: 33.31 KG/M2
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
## 2024-12-12 DIAGNOSIS — L03.90 CELLULITIS: ICD-10-CM

## 2024-12-12 PROCEDURE — 99284 EMERGENCY DEPT VISIT MOD MDM: CPT | Performed by: EMERGENCY MEDICINE

## 2024-12-12 PROCEDURE — 99283 EMERGENCY DEPT VISIT LOW MDM: CPT

## 2024-12-12 PROCEDURE — 73140 X-RAY EXAM OF FINGER(S): CPT

## 2024-12-12 RX ORDER — SULFAMETHOXAZOLE AND TRIMETHOPRIM 800; 160 MG/1; MG/1
1 TABLET ORAL 2 TIMES DAILY
Qty: 14 TABLET | Refills: 0 | Status: SHIPPED | OUTPATIENT
Start: 2024-12-12 | End: 2024-12-19

## 2024-12-12 RX ORDER — METRONIDAZOLE 500 MG/1
500 TABLET ORAL ONCE
Status: COMPLETED | OUTPATIENT
Start: 2024-12-12 | End: 2024-12-12

## 2024-12-12 RX ORDER — METRONIDAZOLE 500 MG/1
500 TABLET ORAL EVERY 8 HOURS SCHEDULED
Qty: 21 TABLET | Refills: 0 | Status: SHIPPED | OUTPATIENT
Start: 2024-12-12 | End: 2024-12-19

## 2024-12-12 RX ORDER — SULFAMETHOXAZOLE AND TRIMETHOPRIM 800; 160 MG/1; MG/1
1 TABLET ORAL ONCE
Status: COMPLETED | OUTPATIENT
Start: 2024-12-12 | End: 2024-12-12

## 2024-12-12 RX ADMIN — SULFAMETHOXAZOLE AND TRIMETHOPRIM 1 TABLET: 800; 160 TABLET ORAL at 15:15

## 2024-12-12 RX ADMIN — METRONIDAZOLE 500 MG: 500 TABLET ORAL at 15:15

## 2024-12-12 NOTE — ED PROVIDER NOTES
Pt Name: Maria Luz Ponce  MRN: [id number]  Birthdate 1965  Age/Sex: 59 y.o. female  Date of evaluation: 12/12/2024  PCP: Nohelia Whitman MD        FINAL IMPRESSION    Final diagnoses:   Cat bite, initial encounter   Cellulitis         DISPOSITION/PLAN    Time reflects when diagnosis was documented in both MDM as applicable and the Disposition within this note       Time User Action Codes Description Comment    12/12/2024  2:53 PM Kayla Paul Add [W55.01XA] Cat bite, initial encounter     12/12/2024  3:27 PM Kayla Paul Add [L03.90] Cellulitis           ED Disposition       ED Disposition   Discharge    Condition   Stable    Date/Time   u Dec 12, 2024  3:26 PM    Comment   Maria Luz Ponce discharge to home/self care.                   Follow-up Information       Follow up With Specialties Details Why Contact Info Additional Information    Lalo Call MD Hand Surgery, Orthopedic Surgery Schedule an appointment as soon as possible for a visit   [address] [place] [address]  [phone]       [facility] Emergency Medicine  As needed, If symptoms worsen [address] [place], [place] [id number] [phone] [facility], [address] [place] [address], [facility]              PATIENT REFERRED TO:    Lalo Call MD  [address] [place] [address]  [phone]    Schedule an appointment as soon as possible for a visit       [facility]  [address] [place], [place] [id number] [phone]    As needed, If symptoms worsen      DISCHARGE MEDICATIONS:    Discharge Medication List as of 12/12/2024  3:27 PM        START taking these medications    Details   metroNIDAZOLE (FLAGYL) 500 mg tablet Take 1 tablet (500 mg total) by mouth every 8 (eight) hours for 7 days, Starting Thu 12/12/2024, Until Thu 12/19/2024, Normal       sulfamethoxazole-trimethoprim (BACTRIM DS) 800-160 mg per tablet Take 1 tablet by mouth 2 (two) times a day for 7 days smx-tmp DS (BACTRIM) 800-160 mg tabs (1tab q12 D10), Starting Thu 12/12/2024, Until Thu 12/19/2024, Normal           CONTINUE these medications which have NOT CHANGED    Details   acetaminophen (TYLENOL) 500 mg tablet Take 1,000 mg by mouth every 6 (six) hours as needed for mild pain, Historical Med      albuterol (PROVENTIL HFA,VENTOLIN HFA) 90 mcg/act inhaler Inhale 2 puffs if needed, Starting Wed 9/13/2017, Historical Med      Cholecalciferol (VITAMIN D3) 3000 units TABS Take 3,000 Units by mouth daily, Historical Med      Diclofenac Sodium (VOLTAREN) 1 % Apply 2 g topically if needed, Historical Med      dicyclomine (BENTYL) 10 mg capsule TAKE 1 CAPSULE (10 MG TOTAL) BY MOUTH 3 (THREE) TIMES A DAY AS NEEDED (DIARRHEA AND ABDOMINAL PAIN), Starting Thu 8/1/2024, Until Mon 2/17/2025 at 2359, Normal      DULoxetine (CYMBALTA) 60 mg delayed release capsule Take 60 mg by mouth daily, Starting Sun 5/12/2024, Historical Med      famotidine (PEPCID) 10 mg tablet Take 10 mg by mouth in the morning, Historical Med      fexofenadine (ALLEGRA) 180 MG tablet Take 180 mg by mouth as needed, Historical Med      fluticasone (FLONASE) 50 mcg/act nasal spray SPRAY 2 SPRAYS INTO EACH NOSTRIL 2 TIMES A DAY, Normal      fluticasone-salmeterol (ADVAIR, WIXELA) 100-50 mcg/dose inhaler Inhale 1 puff 2 (two) times a day, Starting Tue 12/22/2020, Until Tue 12/10/2024, Historical Med      gabapentin (NEURONTIN) 100 mg capsule Take 200 mg by mouth daily at bedtime, Starting Tue 4/23/2024, Historical Med      ipratropium (ATROVENT) 0.06 % nasal spray SPRAY 2 SPRAYS INTO EACH NOSTRIL 3 TIMES A DAY., Normal      lidocaine (LIDODERM) 5 % APPLY TO AREA OF PAIN FOR UP TO 12 HOURS PER DAY, MAY CUT TO SIZE NEEDED, Historical Med      melatonin 3 mg Take 3 mg by mouth daily at bedtime, Historical Med      meloxicam (MOBIC) 15 mg  tablet Take 15 mg by mouth daily, Starting Tue 8/14/2018, Historical Med      metaxalone (SKELAXIN) 800 mg tablet Take 800 mg by mouth 3 (three) times a day, Historical Med      methocarbamol (ROBAXIN) 750 mg tablet Take 750 mg by mouth if needed, Historical Med      Omega-3 1000 MG CAPS Take 1 capsule by mouth daily, Historical Med      ondansetron (ZOFRAN) 4 mg tablet Historical Med      oxybutynin (DITROPAN-XL) 5 mg 24 hr tablet Take 1 tablet (5 mg total) by mouth daily, Starting Fri 10/25/2024, Normal      phenazopyridine (PYRIDIUM) 200 mg tablet Take 1 tablet (200 mg total) by mouth 3 (three) times a day with meals, Starting Fri 10/25/2024, Normal      Probiotic Product (PROBIOTIC BLEND PO) Take 1 capsule by mouth in the morning, Historical Med      sodium chloride (OCEAN) 0.65 % nasal spray 2 sprays into each nostril every 2 (two) hours while awake, Starting Tue 1/3/2023, Until Fri 10/4/2024, Normal      tamsulosin (FLOMAX) 0.4 mg Take 1 capsule (0.4 mg total) by mouth daily with dinner, Starting Fri 10/25/2024, Normal      TART CHERRY PO Take 2 capsules by mouth 2 (two) times a day, Historical Med      tirzepatide (Zepbound) 5 mg/0.5 mL auto-injector Inject 0.5 mL (5 mg total) under the skin once a week, Starting Fri 12/6/2024, Until Fri 1/31/2025, Normal      traMADol (ULTRAM) 50 mg tablet Take 50 mg by mouth every 6 (six) hours as needed for moderate pain, Historical Med      valsartan (DIOVAN) 160 mg tablet Take 160 mg by mouth daily, Starting Tue 9/10/2024, Until Wed 9/10/2025, Historical Med             No discharge procedures on file.          CHIEF COMPLAINT    Chief Complaint   Patient presents with    Cat Bite     Bitten on right thumb by cat at a sanctuary she [facility] at, seen at urgent care initially, taking PO antibiotics but wound not improving. +redness and decreased ROM         HPI    Maria Luz presents to the Emergency Department complaining of cat bite.  She is allergic to augmentin and was  put on zithromax.  She has been taking it and it seems that the pain and swelling are getting worse.  No fever.  No lymphangitic streaking.  She has no other complaints.   Her tetanus vaccine is up to date and the animal is fully vaccinated.        HPI      Past Medical and Surgical History    Past Medical History:   Diagnosis Date    Ankle fracture     Asthma     Cervical spinal stenosis     Closed left arm fracture, sequela     Concussion     Degeneration, intervertebral disc, cervical     Fibromyalgia, primary     Hypertension     Kidney stone     Migraine     Mixed hyperlipidemia 01/05/2021    Right arm fracture     Seasonal allergies     Shingles     Vitamin D deficiency        Past Surgical History:   Procedure Laterality Date    CARPAL TUNNEL RELEASE Bilateral     CATARACT EXTRACTION Bilateral     FL RETROGRADE PYELOGRAM  09/13/2024    FL RETROGRADE PYELOGRAM  10/25/2024    MOUTH SURGERY      CO COLONOSCOPY FLX DX W/COLLJ SPEC WHEN PFRMD N/A 12/27/2018    Procedure: COLONOSCOPY;  Surgeon: Bita Montalvo MD;  Location: [facility];  Service: Gastroenterology    CO CYSTO/URETERO W/LITHOTRIPSY &INDWELL STENT INSRT Left 09/13/2024    Procedure: CYSTOSCOPY URETEROSCOPY WITH RETROGRADE PYELOGRAM AND INSERTION STENT URETERAL;  Surgeon: Eder Johns MD;  Location[facility];  Service: Urology    CO CYSTO/URETERO W/LITHOTRIPSY &INDWELL STENT INSRT Left 10/25/2024    Procedure: CYSTO USCOPE, RETROGRADE PYLEOGRAM WITH BASKET EXTRACTION OF STONE;  Surgeon: Eder Johns MD;  [facility];  Service: Urology    CO LAPAROSCOPY W TOTAL HYSTERECTOMY UTERUS 250 GM/< N/A 11/13/2024    Procedure: TOTAL LAPAROSCOPIC HYSTERECTOMY., BILATERAL SALPINGOOPHORECTOMY, CYSTOSCOPY WITH INSERTION URETERAL CATHETER;  Surgeon: Nahun Ricardo MD;  Location[facility];  Service: Gynecology Oncology    SINUS SURGERY      TUBAL LIGATION         Family History   Problem Relation Age of Onset    Heart disease Father      No Known Problems Daughter     No Known Problems Daughter     No Known Problems Daughter     No Known Problems Maternal Grandmother     No Known Problems Maternal Grandfather     No Known Problems Paternal Grandmother     No Known Problems Paternal Grandfather     Uterine cancer Maternal Aunt 75    Lung cancer Paternal Aunt         age unknown       Social History     Tobacco Use    Smoking status: Never    Smokeless tobacco: Never   Vaping Use    Vaping status: Never Used   Substance Use Topics    Alcohol use: Not Currently     Comment: social    Drug use: No         .    Allergies    Allergies   Allergen Reactions    Amoxicillin-Pot Clavulanate Hives    Omnipaque [Iohexol] Vomiting    Penicillins        Home Medications    Prior to Admission medications    Medication Sig Start Date End Date Taking? Authorizing Provider   acetaminophen (TYLENOL) 500 mg tablet Take 1,000 mg by mouth every 6 (six) hours as needed for mild pain    Historical Provider, MD   albuterol (PROVENTIL HFA,VENTOLIN HFA) 90 mcg/act inhaler Inhale 2 puffs if needed 9/13/17   Historical Provider, MD   Cholecalciferol (VITAMIN D3) 3000 units TABS Take 3,000 Units by mouth daily    Historical Provider, MD   Diclofenac Sodium (VOLTAREN) 1 % Apply 2 g topically if needed    Historical Provider, MD   dicyclomine (BENTYL) 10 mg capsule TAKE 1 CAPSULE (10 MG TOTAL) BY MOUTH 3 (THREE) TIMES A DAY AS NEEDED (DIARRHEA AND ABDOMINAL PAIN) 8/1/24 2/17/25  Bita Montalvo MD   DULoxetine (CYMBALTA) 60 mg delayed release capsule Take 60 mg by mouth daily 5/12/24   Historical Provider, MD   famotidine (PEPCID) 10 mg tablet Take 10 mg by mouth in the morning    Historical Provider, MD   fexofenadine (ALLEGRA) 180 MG tablet Take 180 mg by mouth as needed    Historical Provider, MD   fluticasone (FLONASE) 50 mcg/act nasal spray SPRAY 2 SPRAYS INTO EACH NOSTRIL 2 TIMES A DAY  Patient taking differently: 2 sprays into each nostril as needed 9/13/22   Shanda  GUANAKO Crump   fluticasone-salmeterol (ADVAIR, WIXELA) 100-50 mcg/dose inhaler Inhale 1 puff 2 (two) times a day 12/22/20 12/10/24  Historical Provider, MD   gabapentin (NEURONTIN) 100 mg capsule Take 200 mg by mouth daily at bedtime 4/23/24   Historical Provider, MD   ipratropium (ATROVENT) 0.06 % nasal spray SPRAY 2 SPRAYS INTO EACH NOSTRIL 3 TIMES A DAY.  Patient taking differently: 1 spray into each nostril as needed 9/15/22   Rosa Chávez PA-C   lidocaine (LIDODERM) 5 % APPLY TO AREA OF PAIN FOR UP TO 12 HOURS PER DAY, MAY CUT TO SIZE NEEDED    Historical Provider, MD   melatonin 3 mg Take 3 mg by mouth daily at bedtime    Historical Provider, MD   meloxicam (MOBIC) 15 mg tablet Take 15 mg by mouth daily 8/14/18   Historical Provider, MD   metaxalone (SKELAXIN) 800 mg tablet Take 800 mg by mouth 3 (three) times a day    Historical Provider, MD   methocarbamol (ROBAXIN) 750 mg tablet Take 750 mg by mouth if needed    Historical Provider, MD   [place]-3 1000 MG CAPS Take 1 capsule by mouth daily    Historical Provider, MD   ondansetron (ZOFRAN) 4 mg tablet     Historical Provider, MD   oxybutynin (DITROPAN-XL) 5 mg 24 hr tablet Take 1 tablet (5 mg total) by mouth daily 10/25/24   Eder Johns MD   phenazopyridine (PYRIDIUM) 200 mg tablet Take 1 tablet (200 mg total) by mouth 3 (three) times a day with meals 10/25/24   Eder Johns MD   Probiotic Product (PROBIOTIC BLEND PO) Take 1 capsule by mouth in the morning    Historical Provider, MD   sodium chloride (OCEAN) 0.65 % nasal spray 2 sprays into each nostril every 2 (two) hours while awake  Patient taking differently: 2 sprays into each nostril as needed 1/3/23 10/4/24  Zeb Greenberg MD   tamsulosin (FLOMAX) 0.4 mg Take 1 capsule (0.4 mg total) by mouth daily with dinner 10/25/24   Eder Johns MD   TART CHERRY PO Take 2 capsules by mouth 2 (two) times a day    Historical Provider, MD   tirzepatide (Zepbound) 5 mg/0.5 mL auto-injector  Inject 0.5 mL (5 mg total) under the skin once a week 12/6/24 1/31/25  Marina Hurst PA-C   traMADol (ULTRAM) 50 mg tablet Take 50 mg by mouth every 6 (six) hours as needed for moderate pain    Historical Provider, MD   valsartan (DIOVAN) 160 mg tablet Take 160 mg by mouth daily 9/10/24 9/10/25  Historical Provider, MD           Review of Systems    Review of Systems   Constitutional:  Negative for chills and fever.   HENT:  Negative for ear pain and sore throat.    Eyes:  Negative for pain and visual disturbance.   Respiratory:  Negative for cough and shortness of breath.    Cardiovascular:  Negative for chest pain and palpitations.   Gastrointestinal:  Negative for abdominal pain and vomiting.   Genitourinary:  Negative for dysuria and hematuria.   Musculoskeletal:  Negative for arthralgias and back pain.   Skin:  Negative for color change and rash.   Neurological:  Negative for seizures and syncope.   All other systems reviewed and are negative.      Physical Exam      ED Triage Vitals [12/12/24 1425]   Temperature Pulse Respirations Blood Pressure SpO2   98.2 °F (36.8 °C) 97 15 126/61 98 %      Temp src Heart Rate Source Patient Position - Orthostatic VS BP Location FiO2 (%)   -- -- -- -- --      Pain Score       5               Physical Exam  Vitals and nursing note reviewed.   Constitutional:       General: She is not in acute distress.     Appearance: She is well-developed.   HENT:      Head: Normocephalic and atraumatic.   Eyes:      Conjunctiva/sclera: Conjunctivae normal.   Cardiovascular:      Rate and Rhythm: Normal rate and regular rhythm.      Heart sounds: No murmur heard.  Pulmonary:      Effort: Pulmonary effort is normal. No respiratory distress.      Breath sounds: Normal breath sounds.   Abdominal:      Palpations: Abdomen is soft.      Tenderness: There is no abdominal tenderness.   Musculoskeletal:         General: No swelling.      Right hand: Swelling and tenderness present.      Cervical  back: Neck supple.      Comments: There are two puncture wounds on distal right thumb.  She has tenderness and erythema with some limited ROM likely due to swelling.    Skin:     General: Skin is warm and dry.      Capillary Refill: Capillary refill takes less than 2 seconds.   Neurological:      Mental Status: She is alert.   Psychiatric:         Mood and Affect: Mood normal.         Assessment and Plan    Maria Luz Ponce is a 59 y.o. female who presents with infected cat bite. Physical examination remarkable for two puncture wounds on right distal thumb.  One is adjacent to the nail and had reportedly been draining. Differential diagnosis (not completely inclusive) includes developing abscess. Plan will be to perform diagnostic testing and treat symptomatically.      MDM  Number of Diagnoses or Management Options  Cat bite, initial encounter  Cellulitis  Diagnosis management comments: Patient is going to follow up with hand surgery as an outpatient.  We will upgrade abx to dual coverage for animal bite with bactrim and flagyl.         Diagnostic Results      Labs:      Radiology:    XR thumb first digit-min 2 views RIGHT   Final Result      No retained tooth fragments or acute osseous abnormalities.         Computerized Assisted Algorithm (CAA) may have been used to analyze all applicable images.               Workstation performed: [id number]             All radiology studies independently viewed by me and interpreted by the radiologist.    Procedure    Procedures      ED Course of Care and Re-Assessments      Medications   sulfamethoxazole-trimethoprim (BACTRIM DS) 800-160 mg per tablet 1 tablet (1 tablet Oral Given 12/12/24 1515)   metroNIDAZOLE (FLAGYL) tablet 500 mg (500 mg Oral Given 12/12/24 1515)                  Kayla Paul, DO Kayla Paul,   12/12/24 [id number]

## 2024-12-13 DIAGNOSIS — E66.812 CLASS 2 SEVERE OBESITY DUE TO EXCESS CALORIES WITH SERIOUS COMORBIDITY AND BODY MASS INDEX (BMI) OF 37.0 TO 37.9 IN ADULT (HCC): ICD-10-CM

## 2024-12-13 DIAGNOSIS — E66.01 CLASS 2 SEVERE OBESITY DUE TO EXCESS CALORIES WITH SERIOUS COMORBIDITY AND BODY MASS INDEX (BMI) OF 37.0 TO 37.9 IN ADULT (HCC): ICD-10-CM

## 2024-12-16 RX ORDER — TIRZEPATIDE 5 MG/.5ML
5 INJECTION, SOLUTION SUBCUTANEOUS WEEKLY
Qty: 6 ML | Refills: 0 | Status: SHIPPED | OUTPATIENT
Start: 2024-12-16 | End: 2025-03-10

## 2024-12-27 DIAGNOSIS — Z00.6 ENCOUNTER FOR EXAMINATION FOR NORMAL COMPARISON OR CONTROL IN CLINICAL RESEARCH PROGRAM: ICD-10-CM

## 2025-01-13 ENCOUNTER — OFFICE VISIT (OUTPATIENT)
Dept: URGENT CARE | Age: 60
End: 2025-01-13
Payer: COMMERCIAL

## 2025-01-13 VITALS
HEART RATE: 71 BPM | RESPIRATION RATE: 18 BRPM | HEIGHT: 66 IN | TEMPERATURE: 97.5 F | DIASTOLIC BLOOD PRESSURE: 77 MMHG | WEIGHT: 198 LBS | BODY MASS INDEX: 31.82 KG/M2 | SYSTOLIC BLOOD PRESSURE: 114 MMHG | OXYGEN SATURATION: 96 %

## 2025-01-13 DIAGNOSIS — B02.9 HERPES ZOSTER WITHOUT COMPLICATION: Primary | ICD-10-CM

## 2025-01-13 PROCEDURE — S9083 URGENT CARE CENTER GLOBAL: HCPCS

## 2025-01-13 PROCEDURE — G0382 LEV 3 HOSP TYPE B ED VISIT: HCPCS

## 2025-01-13 RX ORDER — VALACYCLOVIR HYDROCHLORIDE 1 G/1
1000 TABLET, FILM COATED ORAL 3 TIMES DAILY
Qty: 21 TABLET | Refills: 0 | Status: SHIPPED | OUTPATIENT
Start: 2025-01-13 | End: 2025-01-20

## 2025-01-13 NOTE — PROGRESS NOTES
[facility]        NAME: Maria Luz Ponce is a 59 y.o. female  : 1965    MRN: [id number]  DATE: 2025  TIME: 9:46 AM    Assessment and Plan   Herpes zoster without complication [B02.9]  1. Herpes zoster without complication  valACYclovir (VALTREX) 1,000 mg tablet        Rash consistent with Shingles, right side back to flank following dermatone.  Valtrex initiated.  No face or eye involvement.  Teaching done with pt regarding cover rash, good hand washing, follow up with PCP.  ER precautions.    Patient Instructions       Follow up with PCP in 3-5 days.  Proceed to  ER if symptoms worsen.    If tests have been performed at [facility], our office will contact you with results if changes need to be made to the care plan discussed with you at the visit.  [facility]    Chief Complaint     Chief Complaint   Patient presents with   • Rash     Started last night with rash that is itching to right mid back flank area... hx of shingles thinks it is started again.          History of Present Illness       Is a 59-year-old female presenting with rash to right side flank for 1 day.  Reports pain to the area with mild itching, slowly progressing and spreading.  She denies fever, chills, injury to the area.  She reports history of shingles.  Not taking anything for symptoms.    Rash  Pertinent negatives include no fever.       Review of Systems   Review of Systems   Constitutional:  Negative for chills and fever.   Skin:  Positive for rash.         Current Medications       Current Outpatient Medications:   •  acetaminophen (TYLENOL) 500 mg tablet, Take 1,000 mg by mouth every 6 (six) hours as needed for mild pain, Disp: , Rfl:   •  albuterol (PROVENTIL HFA,VENTOLIN HFA) 90 mcg/act inhaler, Inhale 2 puffs if needed, Disp: , Rfl:   •  Cholecalciferol (VITAMIN D3) 3000 units TABS, Take 3,000 Units by mouth daily, Disp: , Rfl:   •  Diclofenac Sodium (VOLTAREN) 1  %, Apply 2 g topically if needed, Disp: , Rfl:   •  dicyclomine (BENTYL) 10 mg capsule, TAKE 1 CAPSULE (10 MG TOTAL) BY MOUTH 3 (THREE) TIMES A DAY AS NEEDED (DIARRHEA AND ABDOMINAL PAIN), Disp: 300 capsule, Rfl: 1  •  DULoxetine (CYMBALTA) 60 mg delayed release capsule, Take 60 mg by mouth daily, Disp: , Rfl:   •  famotidine (PEPCID) 10 mg tablet, Take 10 mg by mouth in the morning, Disp: , Rfl:   •  fexofenadine (ALLEGRA) 180 MG tablet, Take 180 mg by mouth as needed, Disp: , Rfl:   •  fluticasone (FLONASE) 50 mcg/act nasal spray, SPRAY 2 SPRAYS INTO EACH NOSTRIL 2 TIMES A DAY, Disp: 96 mL, Rfl: 2  •  gabapentin (NEURONTIN) 100 mg capsule, Take 200 mg by mouth daily at bedtime, Disp: , Rfl:   •  melatonin 3 mg, Take 3 mg by mouth daily at bedtime, Disp: , Rfl:   •  meloxicam (MOBIC) 15 mg tablet, Take 15 mg by mouth daily, Disp: , Rfl:   •  methocarbamol (ROBAXIN) 750 mg tablet, Take 750 mg by mouth if needed, Disp: , Rfl:   •  Omega-3 1000 MG CAPS, Take 1 capsule by mouth daily, Disp: , Rfl:   •  Probiotic Product (PROBIOTIC BLEND PO), Take 1 capsule by mouth in the morning, Disp: , Rfl:   •  TART CHERRY PO, Take 2 capsules by mouth 2 (two) times a day, Disp: , Rfl:   •  tirzepatide (Zepbound) 5 mg/0.5 mL auto-injector, Inject 0.5 mL (5 mg total) under the skin once a week, Disp: 6 mL, Rfl: 0  •  valACYclovir (VALTREX) 1,000 mg tablet, Take 1 tablet (1,000 mg total) by mouth 3 (three) times a day for 7 days, Disp: 21 tablet, Rfl: 0  •  valsartan (DIOVAN) 160 mg tablet, Take 160 mg by mouth daily, Disp: , Rfl:   •  fluticasone-salmeterol (ADVAIR, WIXELA) 100-50 mcg/dose inhaler, Inhale 1 puff 2 (two) times a day, Disp: , Rfl:   •  ipratropium (ATROVENT) 0.06 % nasal spray, SPRAY 2 SPRAYS INTO EACH NOSTRIL 3 TIMES A DAY. (Patient taking differently: 1 spray into each nostril as needed), Disp: 45 mL, Rfl: 3  •  lidocaine (LIDODERM) 5 %, APPLY TO AREA OF PAIN FOR UP TO 12 HOURS PER DAY, MAY CUT TO SIZE NEEDED  (Patient not taking: Reported on 1/13/2025), Disp: , Rfl:   •  metaxalone (SKELAXIN) 800 mg tablet, Take 800 mg by mouth 3 (three) times a day (Patient not taking: Reported on 1/13/2025), Disp: , Rfl:   •  ondansetron (ZOFRAN) 4 mg tablet, , Disp: , Rfl:   •  oxybutynin (DITROPAN-XL) 5 mg 24 hr tablet, Take 1 tablet (5 mg total) by mouth daily, Disp: 5 tablet, Rfl: 0  •  phenazopyridine (PYRIDIUM) 200 mg tablet, Take 1 tablet (200 mg total) by mouth 3 (three) times a day with meals, Disp: 10 tablet, Rfl: 0  •  sodium chloride (OCEAN) 0.65 % nasal spray, 2 sprays into each nostril every 2 (two) hours while awake (Patient taking differently: 2 sprays into each nostril as needed), Disp: 30 mL, Rfl: 2  •  tamsulosin (FLOMAX) 0.4 mg, Take 1 capsule (0.4 mg total) by mouth daily with dinner, Disp: 7 capsule, Rfl: 0  •  traMADol (ULTRAM) 50 mg tablet, Take 50 mg by mouth every 6 (six) hours as needed for moderate pain, Disp: , Rfl:     Current Allergies     Allergies as of 01/13/2025 - Reviewed 01/13/2025   Allergen Reaction Noted   • Amoxicillin-pot clavulanate Hives    • Omnipaque [iohexol] Vomiting 10/23/2024   • Penicillins              The following portions of the patient's history were reviewed and updated as appropriate: allergies, current medications, past family history, past medical history, past social history, past surgical history and problem list.     Past Medical History:   Diagnosis Date   • Ankle fracture    • Asthma    • Cervical spinal stenosis    • Closed left arm fracture, sequela    • Concussion    • Degeneration, intervertebral disc, cervical    • Fibromyalgia, primary    • Hypertension    • Kidney stone    • Migraine    • Mixed hyperlipidemia 01/05/2021   • Right arm fracture    • Seasonal allergies    • Shingles    • Vitamin D deficiency        Past Surgical History:   Procedure Laterality Date   • CARPAL TUNNEL RELEASE Bilateral    • CATARACT EXTRACTION Bilateral    • FL RETROGRADE PYELOGRAM   "09/13/2024   • FL RETROGRADE PYELOGRAM  10/25/2024   • MOUTH SURGERY     • LA COLONOSCOPY FLX DX W/COLLJ SPEC WHEN PFRMD N/A 12/27/2018    Procedure: COLONOSCOPY;  Surgeon: Bita Montalvo MD;  [facility];  Service: Gastroenterology   • LA CYSTO/URETERO W/LITHOTRIPSY &INDWELL STENT INSRT Left 09/13/2024    Procedure: CYSTOSCOPY URETEROSCOPY WITH RETROGRADE PYELOGRAM AND INSERTION STENT URETERAL;  Surgeon: Eder Johns MD;  [facility];  Service: Urology   • LA CYSTO/URETERO W/LITHOTRIPSY &INDWELL STENT INSRT Left 10/25/2024    Procedure: CYSTO USCOPE, RETROGRADE PYLEOGRAM WITH BASKET EXTRACTION OF STONE;  Surgeon: Eder Johns MD;  [facility];  Service: Urology   • LA LAPAROSCOPY W TOTAL HYSTERECTOMY UTERUS 250 GM/< N/A 11/13/2024    Procedure: TOTAL LAPAROSCOPIC HYSTERECTOMY., BILATERAL SALPINGOOPHORECTOMY, CYSTOSCOPY WITH INSERTION URETERAL CATHETER;  Surgeon: Nahun Ricardo MD;  [facility];  Service: Gynecology Oncology   • SINUS SURGERY     • TUBAL LIGATION         Family History   Problem Relation Age of Onset   • Heart disease Father    • No Known Problems Daughter    • No Known Problems Daughter    • No Known Problems Daughter    • No Known Problems Maternal Grandmother    • No Known Problems Maternal Grandfather    • No Known Problems Paternal Grandmother    • No Known Problems Paternal Grandfather    • Uterine cancer Maternal Aunt 75   • Lung cancer Paternal Aunt         age unknown         Medications have been verified.        Objective   /77   Pulse 71   Temp 97.5 °F (36.4 °C)   Resp 18   Ht 5' 6\" (1.676 m)   Wt 89.8 kg (198 lb)   LMP 03/15/2019 (Exact Date)   SpO2 96%   BMI 31.96 kg/m²   Patient's last menstrual period was 03/15/2019 (exact date).       Physical Exam     Physical Exam  Vitals and nursing note reviewed.   Constitutional:       General: She is not in acute distress.     Appearance: Normal appearance. She is normal " weight.   HENT:      Nose: Nose normal.   Eyes:      Extraocular Movements: Extraocular movements intact.      Conjunctiva/sclera: Conjunctivae normal.      Pupils: Pupils are equal, round, and reactive to light.   Cardiovascular:      Rate and Rhythm: Normal rate.      Pulses: Normal pulses.   Pulmonary:      Effort: Pulmonary effort is normal. No respiratory distress.   Abdominal:      General: Abdomen is flat.   Skin:     General: Skin is warm.      Findings: Rash present. Rash is macular and vesicular.          Neurological:      Mental Status: She is alert.

## 2025-01-13 NOTE — LETTER
January 13, 2025     Patient: Maria Luz Ponce   YOB: 1965   Date of Visit: 1/13/2025       To Whom it May Concern:    Maria Luz Ponce was seen in my clinic on 1/13/2025. She may return to work on 1/15/2025 .    If you have any questions or concerns, please don't hesitate to call.         Sincerely,          PRAVEEN Huntley        CC: No Recipients

## 2025-01-13 NOTE — PATIENT INSTRUCTIONS
Valtrex 1000 mg TID for 7    Keep affected area covered to avoid transmission   Keep covered until crusted  Good hand hygiene  Avoid contact with immunocompromised individuals, premature or low birth weight infants, and individuals who are pregnant.   Take Valtrex as prescribed  Take Tylenol/Ibuprofen as needed for pain/discomfort   Cool compresses over area  Talk to your PCP about varicella zoster vaccine

## 2025-02-18 ENCOUNTER — OFFICE VISIT (OUTPATIENT)
Dept: BARIATRICS | Facility: CLINIC | Age: 60
End: 2025-02-18
Payer: COMMERCIAL

## 2025-02-18 VITALS
DIASTOLIC BLOOD PRESSURE: 78 MMHG | SYSTOLIC BLOOD PRESSURE: 136 MMHG | BODY MASS INDEX: 32.24 KG/M2 | HEART RATE: 90 BPM | TEMPERATURE: 97.6 F | WEIGHT: 200.6 LBS | HEIGHT: 66 IN | RESPIRATION RATE: 16 BRPM

## 2025-02-18 DIAGNOSIS — E66.811 OBESITY (BMI 30.0-34.9): Primary | ICD-10-CM

## 2025-02-18 DIAGNOSIS — K76.0 HEPATIC STEATOSIS: ICD-10-CM

## 2025-02-18 DIAGNOSIS — N20.9 UROLITHIASIS: ICD-10-CM

## 2025-02-18 DIAGNOSIS — E66.09 CLASS 1 OBESITY DUE TO EXCESS CALORIES WITH SERIOUS COMORBIDITY AND BODY MASS INDEX (BMI) OF 32.0 TO 32.9 IN ADULT: ICD-10-CM

## 2025-02-18 DIAGNOSIS — I10 PRIMARY HYPERTENSION: ICD-10-CM

## 2025-02-18 DIAGNOSIS — E66.811 CLASS 1 OBESITY DUE TO EXCESS CALORIES WITH SERIOUS COMORBIDITY AND BODY MASS INDEX (BMI) OF 32.0 TO 32.9 IN ADULT: ICD-10-CM

## 2025-02-18 PROCEDURE — 99214 OFFICE O/P EST MOD 30 MIN: CPT | Performed by: PHYSICIAN ASSISTANT

## 2025-02-18 RX ORDER — TIRZEPATIDE 7.5 MG/.5ML
7.5 INJECTION, SOLUTION SUBCUTANEOUS WEEKLY
Qty: 6 ML | Refills: 0 | Status: SHIPPED | OUTPATIENT
Start: 2025-02-18 | End: 2025-05-19

## 2025-02-18 NOTE — ASSESSMENT & PLAN NOTE
On diovan and seeing PCP to see if dose needs adjustment  -should improve with weight loss, dietary, and lifestyle changes

## 2025-02-18 NOTE — ASSESSMENT & PLAN NOTE
-Patient is pursuing conservative plan after healthycore  -Initial weight loss goal of 5-10% weight loss for improved health  -Screening labs 10/23/24    Initial:231.2  Last Visit:202.6  Current:200.6  Change:-30.6  Goal:    On zepbound 5mg and to increase to 7.5mg. 14% weight loss. Has had some dizziness and not sure if related to renal calculi and meds or orthostatic hypotension     Goals:  -recommend to reduce soda intake and plans on giving up for lent  -to try to have protein 3 times a day  -recommend to start to exercise 1 hour weekly and gradually increasxze to

## 2025-02-18 NOTE — PROGRESS NOTES
Assessment/Plan:    Class 1 obesity due to excess calories with serious comorbidity and body mass index (BMI) of 32.0 to 32.9 in adult  -Patient is pursuing conservative plan after healthycore  -Initial weight loss goal of 5-10% weight loss for improved health  -Screening labs 10/23/24    Initial:231.2  Last Visit:202.6  Current:200.6  Change:-30.6  Goal:    On zepbound 5mg and to increase to 7.5mg. 14% weight loss. Has had some dizziness and not sure if related to renal calculi and meds or orthostatic hypotension     Goals:  -recommend to reduce soda intake and plans on giving up for lent  -to try to have protein 3 times a day  -recommend to start to exercise 1 hour weekly and gradually increasxze to     Hypertension  On diovan and seeing PCP to see if dose needs adjustment  -should improve with weight loss, dietary, and lifestyle changes      Urolithiasis  To continue to increase water intake. Stopped tea and discussed stopping soda        Return in about 5 months (around 7/18/2025).       Diagnoses and all orders for this visit:    Obesity (BMI 30.0-34.9)  -     tirzepatide (Zepbound) 7.5 mg/0.5 mL auto-injector; Inject 0.5 mL (7.5 mg total) under the skin once a week    Hepatic steatosis    Primary hypertension    Urolithiasis    Class 1 obesity due to excess calories with serious comorbidity and body mass index (BMI) of 32.0 to 32.9 in adult          Subjective:   Chief Complaint   Patient presents with    Follow-up     Mwm f/u 6m: waist: 41in.        Patient ID: Maria Luz Ponce  is a 59 y.o. female with excess weight/obesity here to pursue weight managment.  Patient is pursuing Conservative Program.     HPI  On zepbound 5mg.  She has been having dizziness and notes postural hypotension.  The medication is reducing her portion size and she has made changes .    Wt Readings from Last 10 Encounters:   02/18/25 91 kg (200 lb 9.6 oz)   01/13/25 89.8 kg (198 lb)   12/12/24 90.8 kg (200 lb 2.8 oz)   12/10/24 90.7 kg  (200 lb)   11/26/24 91.4 kg (201 lb 6.4 oz)   11/25/24 90.5 kg (199 lb 9.6 oz)   11/21/24 91.9 kg (202 lb 9.6 oz)   11/13/24 93.1 kg (205 lb 4 oz)   11/07/24 93.4 kg (206 lb)   10/31/24 94.7 kg (208 lb 12.8 oz)       Food logging: no , has tiried but falls off track.   Hydration at least 40 oz water, soda at least 24 oz , tea -stopped  Exercise:nothing formal-has gym membership  Occupation:nurse-management [employer]  Dining out/takeout:daily     Diet Recall:   B:cottage cheese or egs if getting at work  S:more snacking at work out of boredom can be crackers or sometimes protein pack:   D:varies but not always the best choice  The following portions of the patient's history were reviewed and updated as appropriate: She  has a past medical history of Ankle fracture, Asthma, Cervical spinal stenosis, Closed left arm fracture, sequela, Concussion, Degeneration, intervertebral disc, cervical, Fibromyalgia, primary, Hypertension, Kidney stone, Migraine, Mixed hyperlipidemia (01/05/2021), Right arm fracture, Seasonal allergies, Shingles, and Vitamin D deficiency.  She   Patient Active Problem List    Diagnosis Date Noted    Class 1 obesity due to excess calories with serious comorbidity and body mass index (BMI) of 32.0 to 32.9 in adult 11/12/2024    Syncope 10/23/2024    Ureteral stent present 10/23/2024    Urolithiasis 09/13/2024    GERD (gastroesophageal reflux disease) 09/13/2024    Cyst of left ovary 09/13/2024    Ureteral calculi 09/13/2024    Cervical spinal stenosis     Hepatic steatosis 07/23/2024    Hypertension     Mild intermittent asthma     Prediabetes 02/16/2023    Abnormal PFT 05/05/2021    Mixed hyperlipidemia 01/05/2021    Diarrhea 11/26/2018    Renal colic on left side 09/24/2018    Lumbar disc disease 08/28/2018    Lumbar facet joint pain 08/28/2018     She  has a past surgical history that includes Carpal tunnel release (Bilateral); Tubal ligation; Sinus surgery; Mouth surgery; Cataract extraction  (Bilateral); pr colonoscopy flx dx w/collj spec when pfrmd (N/A, 12/27/2018); FL retrograde pyelogram (09/13/2024); pr cysto/uretero w/lithotripsy &indwell stent insrt (Left, 09/13/2024); pr cysto/uretero w/lithotripsy &indwell stent insrt (Left, 10/25/2024); FL retrograde pyelogram (10/25/2024); and pr laparoscopy w total hysterectomy uterus 250 gm/< (N/A, 11/13/2024).  Her family history includes Heart disease in her father; Lung cancer in her paternal aunt; No Known Problems in her daughter, daughter, daughter, maternal grandfather, maternal grandmother, paternal grandfather, and paternal grandmother; Uterine cancer (age of onset: 75) in her maternal aunt.  She  reports that she has never smoked. She has never used smokeless tobacco. She reports that she does not currently use alcohol. She reports that she does not use drugs.  Current Outpatient Medications   Medication Sig Dispense Refill    acetaminophen (TYLENOL) 500 mg tablet Take 1,000 mg by mouth every 6 (six) hours as needed for mild pain      albuterol (PROVENTIL HFA,VENTOLIN HFA) 90 mcg/act inhaler Inhale 2 puffs if needed      Cholecalciferol (VITAMIN D3) 3000 units TABS Take 3,000 Units by mouth daily      Diclofenac Sodium (VOLTAREN) 1 % Apply 2 g topically if needed      DULoxetine (CYMBALTA) 60 mg delayed release capsule Take 60 mg by mouth daily      famotidine (PEPCID) 10 mg tablet Take 10 mg by mouth in the morning      fexofenadine (ALLEGRA) 180 MG tablet Take 180 mg by mouth as needed      fluticasone (FLONASE) 50 mcg/act nasal spray SPRAY 2 SPRAYS INTO EACH NOSTRIL 2 TIMES A DAY 96 mL 2    gabapentin (NEURONTIN) 100 mg capsule Take 200 mg by mouth daily at bedtime      melatonin 3 mg Take 3 mg by mouth daily at bedtime      meloxicam (MOBIC) 15 mg tablet Take 15 mg by mouth daily      methocarbamol (ROBAXIN) 750 mg tablet Take 750 mg by mouth if needed      Omega-3 1000 MG CAPS Take 1 capsule by mouth daily      Probiotic Product (PROBIOTIC  BLEND PO) Take 1 capsule by mouth in the morning      TART CHERRY PO Take 2 capsules by mouth 2 (two) times a day      tirzepatide (Zepbound) 7.5 mg/0.5 mL auto-injector Inject 0.5 mL (7.5 mg total) under the skin once a week 6 mL 0    valsartan (DIOVAN) 160 mg tablet Take 160 mg by mouth daily      dicyclomine (BENTYL) 10 mg capsule TAKE 1 CAPSULE (10 MG TOTAL) BY MOUTH 3 (THREE) TIMES A DAY AS NEEDED (DIARRHEA AND ABDOMINAL PAIN) 300 capsule 1    fluticasone-salmeterol (ADVAIR, WIXELA) 100-50 mcg/dose inhaler Inhale 1 puff 2 (two) times a day      ipratropium (ATROVENT) 0.06 % nasal spray SPRAY 2 SPRAYS INTO EACH NOSTRIL 3 TIMES A DAY. (Patient taking differently: 1 spray into each nostril as needed) 45 mL 3    lidocaine (LIDODERM) 5 % APPLY TO AREA OF PAIN FOR UP TO 12 HOURS PER DAY, MAY CUT TO SIZE NEEDED (Patient not taking: Reported on 2/18/2025)      ondansetron (ZOFRAN) 4 mg tablet  (Patient not taking: Reported on 2/18/2025)      sodium chloride (OCEAN) 0.65 % nasal spray 2 sprays into each nostril every 2 (two) hours while awake (Patient taking differently: 2 sprays into each nostril as needed) 30 mL 2    traMADol (ULTRAM) 50 mg tablet Take 50 mg by mouth every 6 (six) hours as needed for moderate pain       No current facility-administered medications for this visit.     Current Outpatient Medications on File Prior to Visit   Medication Sig    acetaminophen (TYLENOL) 500 mg tablet Take 1,000 mg by mouth every 6 (six) hours as needed for mild pain    albuterol (PROVENTIL HFA,VENTOLIN HFA) 90 mcg/act inhaler Inhale 2 puffs if needed    Cholecalciferol (VITAMIN D3) 3000 units TABS Take 3,000 Units by mouth daily    Diclofenac Sodium (VOLTAREN) 1 % Apply 2 g topically if needed    DULoxetine (CYMBALTA) 60 mg delayed release capsule Take 60 mg by mouth daily    famotidine (PEPCID) 10 mg tablet Take 10 mg by mouth in the morning    fexofenadine (ALLEGRA) 180 MG tablet Take 180 mg by mouth as needed     fluticasone (FLONASE) 50 mcg/act nasal spray SPRAY 2 SPRAYS INTO EACH NOSTRIL 2 TIMES A DAY    gabapentin (NEURONTIN) 100 mg capsule Take 200 mg by mouth daily at bedtime    melatonin 3 mg Take 3 mg by mouth daily at bedtime    meloxicam (MOBIC) 15 mg tablet Take 15 mg by mouth daily    methocarbamol (ROBAXIN) 750 mg tablet Take 750 mg by mouth if needed    Omega-3 1000 MG CAPS Take 1 capsule by mouth daily    Probiotic Product (PROBIOTIC BLEND PO) Take 1 capsule by mouth in the morning    TART CHERRY PO Take 2 capsules by mouth 2 (two) times a day    valsartan (DIOVAN) 160 mg tablet Take 160 mg by mouth daily    [DISCONTINUED] tirzepatide (Zepbound) 5 mg/0.5 mL auto-injector Inject 0.5 mL (5 mg total) under the skin once a week    dicyclomine (BENTYL) 10 mg capsule TAKE 1 CAPSULE (10 MG TOTAL) BY MOUTH 3 (THREE) TIMES A DAY AS NEEDED (DIARRHEA AND ABDOMINAL PAIN)    fluticasone-salmeterol (ADVAIR, WIXELA) 100-50 mcg/dose inhaler Inhale 1 puff 2 (two) times a day    ipratropium (ATROVENT) 0.06 % nasal spray SPRAY 2 SPRAYS INTO EACH NOSTRIL 3 TIMES A DAY. (Patient taking differently: 1 spray into each nostril as needed)    lidocaine (LIDODERM) 5 % APPLY TO AREA OF PAIN FOR UP TO 12 HOURS PER DAY, MAY CUT TO SIZE NEEDED (Patient not taking: Reported on 2/18/2025)    ondansetron (ZOFRAN) 4 mg tablet  (Patient not taking: Reported on 2/18/2025)    sodium chloride (OCEAN) 0.65 % nasal spray 2 sprays into each nostril every 2 (two) hours while awake (Patient taking differently: 2 sprays into each nostril as needed)    traMADol (ULTRAM) 50 mg tablet Take 50 mg by mouth every 6 (six) hours as needed for moderate pain    [DISCONTINUED] metaxalone (SKELAXIN) 800 mg tablet Take 800 mg by mouth 3 (three) times a day (Patient not taking: Reported on 1/13/2025)    [DISCONTINUED] oxybutynin (DITROPAN-XL) 5 mg 24 hr tablet Take 1 tablet (5 mg total) by mouth daily    [DISCONTINUED] phenazopyridine (PYRIDIUM) 200 mg tablet Take 1  "tablet (200 mg total) by mouth 3 (three) times a day with meals    [DISCONTINUED] tamsulosin (FLOMAX) 0.4 mg Take 1 capsule (0.4 mg total) by mouth daily with dinner    [DISCONTINUED] valACYclovir (VALTREX) 1,000 mg tablet Take 1 tablet (1,000 mg total) by mouth 3 (three) times a day for 7 days     No current facility-administered medications on file prior to visit.     She is allergic to amoxicillin-pot clavulanate, omnipaque [iohexol], and penicillins..    Review of Systems   Constitutional:  Negative for fever.   Respiratory:  Negative for shortness of breath.    Cardiovascular:  Negative for chest pain and palpitations.   Gastrointestinal:  Negative for abdominal pain, constipation, diarrhea and vomiting.   Genitourinary:  Negative for difficulty urinating.   Musculoskeletal:  Negative for arthralgias and back pain.   Skin:  Negative for rash.   Neurological:  Negative for headaches.   Psychiatric/Behavioral:  Negative for dysphoric mood. The patient is not nervous/anxious.        Objective:    /78   Pulse 90   Temp 97.6 °F (36.4 °C)   Resp 16   Ht 5' 6\" (1.676 m)   Wt 91 kg (200 lb 9.6 oz)   LMP 03/15/2019 (Exact Date)   BMI 32.38 kg/m²      Physical Exam  Vitals and nursing note reviewed.   Constitutional:       General: She is not in acute distress.     Appearance: She is well-developed. She is obese.   HENT:      Head: Normocephalic and atraumatic.   Eyes:      Conjunctiva/sclera: Conjunctivae normal.   Neck:      Thyroid: No thyromegaly.   Pulmonary:      Effort: Pulmonary effort is normal. No respiratory distress.   Skin:     Findings: No rash (visible).   Neurological:      Mental Status: She is alert and oriented to person, place, and time.   Psychiatric:         Mood and Affect: Mood normal.         Behavior: Behavior normal.         "

## 2025-02-25 ENCOUNTER — CLINICAL SUPPORT (OUTPATIENT)
Age: 60
End: 2025-02-25

## 2025-02-25 DIAGNOSIS — R63.5 ABNORMAL WEIGHT GAIN: Primary | ICD-10-CM

## 2025-02-25 PROCEDURE — WMDI30

## 2025-02-25 PROCEDURE — RECHECK

## 2025-02-25 NOTE — PROGRESS NOTES
Weight Management Medical Nutrition Assessment      Maria Luz presented for menu planning.  Today's weight is 202lb. She is doing well on weight loss and will keep in mind her fluid intake, protein intake, and recognizing its okay to have fun or convenient foods, as long as they provide her with adequate protein/nourishment.     Note: increase in soda consumption and had discussion on having it -- decide when, give it a self care moment versus a negative thought. By giving yourself permission we can remove food rules and rather forgo rebelling with over consumption.        Patient seen by Medical Provider in past 6 months:  yes  Requested to schedule appointment with Medical Provider: Yes      Anthropometric Measurements  Start Weight (#): 231 (with provider); 228# starting HC   Current Weight (#): 202lb  TBW % Change from start weight:14%  Ideal Body Weight (#):120  Goal Weight (#):140-150#; 200# Under   Highest:Current   Lowest:200     Weight Loss History  Previous weight loss attempts: Commercial Programs (Weight Watchers, [employer], etc.)  Self Created Diets (Portion Control, Healthy Food Choices, etc.)    Food and Nutrition Related History  Wake up: 515/530AM; 630/7; random    Bed Time:9/10PM     Food Recall  Breakfast:tea, banana or a muffin if I dont eat on the way to work may get eggs (scrambled), sausage, and orange juice;tea with sugar (2 tsp) and dariela      Snack:sometimes Miriam Garaye cookies (portioned) 100 calorie pack  Lunch:deli sandwich like ham and cheese with whole wheat bread LTMP, salad bar (field greens/paula, tomato, cucus, shredded cheddar, olives, cold pasta like macaroni salad, red beets, eggs, dressing (varies), sunflower seeds; pasta bar, sometimes its PB&J, iced tea (Pure Leaf)   Snack:Pepsi, Cheezeitz (portioned bag) OR potato chips (snack bag); sweet treat like an ice sandwich  Dinner:Diner, Chipotle, chick-alejandro-a ([place] toast, soup and salad bar, Grilled cheese with fries, gyro,  pasta), chicken sandwich or 3 strip meal (sans fries)  Snack:-      Beverages: water, 1% milk, juice, regular soda, and coffee/tea  Volume of beverage intake: 0 water intake; however, fluid amount is appropriate     Weekends: Same  Cravings: Varies   Trouble area of day:Home     Frequency of Eating out: daily  Food restrictions:No   Cooking: self   Food Shopping: self    Physical Activity Intake  Activity:Biking  Frequency:rarely  Physical limitations/barriers to exercise: Hips, knees, back     Estimated Needs  Energy  SECA: BMR:1687      X 1.3 -1000 = 1193  [employer] Energy Needs: BMR :1615    1-2# loss weekly sedentary:  938-1438             1-2# loss weekly lightly active:1560-4763  Maintenance calories for sedentary activity level: 1938  Protein:73-92      (1.2-1.5g/kg IBW)  Fluid: 64     (35mL/kg IBW)    Nutrition Diagnosis  Yes;    Excessive energy intake  related to Food and nutrition related knowledge deficit concerning energy intake as evidenced by  BMI >25 for adults       Nutrition Intervention    Nutrition Prescription  Calories:6764-4928  Protein:73-92  Fluid:72    Meal Plan (Rico/Pro/Carb)  Breakfast: 400 rico (25g PRO)  Snack: 150-200 rico (10g PRO)  Lunch: 500 rico (30g PRO)  Snack: 150-200 rico (10g PRO)  Dinner: 500-600 rico (30g PRO)  Snack: -    Nutrition Education:    Healthy Core Manual  Calorie controlled menu  Lean protein food choices  Healthy snack options  Food journaling tips      Nutrition Counseling:  Strategies: meal planning, portion sizes, healthy snack choices, hydration, fiber intake, protein intake, exercise, food journal      Monitoring and Evaluation:  Evaluation criteria:  Energy Intake  Meet protein needs  Maintain adequate hydration  Monitor weekly weight  Meal planning/preparation  Food journal   Decreased portions at mealtimes and snacks  Physical activity     Barriers to learning:none  Readiness to change: Action  Comprehension: very good  Expected Compliance: very  good

## 2025-03-03 VITALS — WEIGHT: 202 LBS | HEIGHT: 66 IN | BODY MASS INDEX: 32.47 KG/M2

## 2025-03-10 ENCOUNTER — APPOINTMENT (OUTPATIENT)
Dept: LAB | Facility: AMBULARY SURGERY CENTER | Age: 60
End: 2025-03-10
Payer: COMMERCIAL

## 2025-03-10 DIAGNOSIS — R53.83 OTHER FATIGUE: ICD-10-CM

## 2025-03-10 DIAGNOSIS — E78.2 MIXED HYPERLIPIDEMIA: ICD-10-CM

## 2025-03-10 DIAGNOSIS — R53.81 MALAISE: ICD-10-CM

## 2025-03-10 DIAGNOSIS — E55.9 VITAMIN D DEFICIENCY: ICD-10-CM

## 2025-03-10 LAB
25(OH)D3 SERPL-MCNC: 44.1 NG/ML (ref 30–100)
BASOPHILS # BLD AUTO: 0.06 THOUSANDS/ÂΜL (ref 0–0.1)
BASOPHILS NFR BLD AUTO: 1 % (ref 0–1)
CHOLEST SERPL-MCNC: 239 MG/DL (ref ?–200)
EOSINOPHIL # BLD AUTO: 0.19 THOUSAND/ÂΜL (ref 0–0.61)
EOSINOPHIL NFR BLD AUTO: 2 % (ref 0–6)
ERYTHROCYTE [DISTWIDTH] IN BLOOD BY AUTOMATED COUNT: 12.4 % (ref 11.6–15.1)
HCT VFR BLD AUTO: 40.9 % (ref 34.8–46.1)
HDLC SERPL-MCNC: 45 MG/DL
HGB BLD-MCNC: 13.6 G/DL (ref 11.5–15.4)
IMM GRANULOCYTES # BLD AUTO: 0.01 THOUSAND/UL (ref 0–0.2)
IMM GRANULOCYTES NFR BLD AUTO: 0 % (ref 0–2)
LDLC SERPL CALC-MCNC: 165 MG/DL (ref 0–100)
LYMPHOCYTES # BLD AUTO: 4.3 THOUSANDS/ÂΜL (ref 0.6–4.47)
LYMPHOCYTES NFR BLD AUTO: 50 % (ref 14–44)
MCH RBC QN AUTO: 31.3 PG (ref 26.8–34.3)
MCHC RBC AUTO-ENTMCNC: 33.3 G/DL (ref 31.4–37.4)
MCV RBC AUTO: 94 FL (ref 82–98)
MONOCYTES # BLD AUTO: 0.77 THOUSAND/ÂΜL (ref 0.17–1.22)
MONOCYTES NFR BLD AUTO: 9 % (ref 4–12)
NEUTROPHILS # BLD AUTO: 3.33 THOUSANDS/ÂΜL (ref 1.85–7.62)
NEUTS SEG NFR BLD AUTO: 38 % (ref 43–75)
NONHDLC SERPL-MCNC: 194 MG/DL
NRBC BLD AUTO-RTO: 0 /100 WBCS
PLATELET # BLD AUTO: 273 THOUSANDS/UL (ref 149–390)
PMV BLD AUTO: 9.9 FL (ref 8.9–12.7)
RBC # BLD AUTO: 4.34 MILLION/UL (ref 3.81–5.12)
TRIGL SERPL-MCNC: 146 MG/DL (ref ?–150)
WBC # BLD AUTO: 8.66 THOUSAND/UL (ref 4.31–10.16)

## 2025-03-10 PROCEDURE — 82306 VITAMIN D 25 HYDROXY: CPT

## 2025-03-10 PROCEDURE — 80061 LIPID PANEL: CPT

## 2025-03-10 PROCEDURE — 36415 COLL VENOUS BLD VENIPUNCTURE: CPT

## 2025-03-10 PROCEDURE — 85025 COMPLETE CBC W/AUTO DIFF WBC: CPT

## 2025-03-18 ENCOUNTER — CLINICAL SUPPORT (OUTPATIENT)
Age: 60
End: 2025-03-18

## 2025-03-18 VITALS — HEIGHT: 66 IN | WEIGHT: 193.4 LBS | BODY MASS INDEX: 31.08 KG/M2

## 2025-03-18 DIAGNOSIS — R63.5 ABNORMAL WEIGHT GAIN: Primary | ICD-10-CM

## 2025-03-18 PROCEDURE — RECHECK

## 2025-03-18 PROCEDURE — WMDI30

## 2025-03-18 NOTE — PROGRESS NOTES
Weight Management Medical Nutrition Assessment      Maria Luz presented for menu planning.  Today's weight is 193.4# with increase of Zepbound, 8.6# lost since last appointment, making overall weight loss of 37.6# since starting Healthy Core. She is doing well on weight loss and will keep in mind her fluid intake, protein intake, and recognizing its okay to have fun or convenient foods, as long as they provide her with adequate protein/nourishment.     Met goal of decrease soda consumption and had just 2 20-oz bottles since lent.       Patient seen by Medical Provider in past 6 months:  yes  Requested to schedule appointment with Medical Provider: Yes      Anthropometric Measurements  Start Weight (#): 231 (with provider); 228# starting HC   Current Weight (#): 193.2lb  TBW % Change from start weight:17%  Ideal Body Weight (#):120  Goal Weight (#):140-150#; 200# Under   Highest:Current   Lowest:200     Weight Loss History  Previous weight loss attempts: Commercial Programs (Weight Watchers, [employer], etc.)  Self Created Diets (Portion Control, Healthy Food Choices, etc.)    Food and Nutrition Related History  Wake up: 515/530AM; 630/7; random    Bed Time:9/10PM     Food Recall  Breakfast:tea, banana or a muffin if I dont eat on the way to work may get eggs (scrambled), sausage, and orange juice;tea with sugar (2 tsp) and [facility] Protein Bar, Eggs,   Snack:sometimes Miriam Nascimento cookies (portioned) 100 calorie pack, Protein packs (eggs, cheese cubes with crackers, grapes)  Lunch:deli sandwich like ham and cheese with whole wheat bread LTMP, salad bar (field greens/paula, tomato, cucus, shredded cheddar, olives, cold pasta like macaroni salad, red beets, eggs, dressing (varies), sunflower seeds; pasta bar, sometimes its PB&J, iced tea (Pure Leaf)   Snack:Pepsi, Cheezeitz (portioned bag) OR potato chips (snack bag); sweet treat like an ice sandwich - Pretzels  Dinner:Diner, Chipotle, chick-alejandro-a ([place]  toast, soup and salad bar, Grilled cheese with fries, gyro, pasta), chicken sandwich or 3 strip meal (sans fries)  Snack:-    Beverages: water, 1% milk, juice, regular soda, and coffee/tea  Volume of beverage intake: 0 water intake; however, fluid amount is appropriate     Weekends: Same  Cravings: Varies   Trouble area of day:Home     Frequency of Eating out: daily  Food restrictions:No   Cooking: self   Food Shopping: self    Physical Activity Intake  Activity:Biking  Frequency:rarely  Physical limitations/barriers to exercise: Hips, knees, back     Estimated Needs  Energy  SECA: BMR:1687      X 1.3 -1000 = 1193  [place] Energy Needs: BMR :1615    1-2# loss weekly sedentary:  938-1438             1-2# loss weekly lightly active:6066-5701  Maintenance calories for sedentary activity level: 1938  Protein:73-92      (1.2-1.5g/kg IBW)  Fluid: 64     (35mL/kg IBW)    Nutrition Diagnosis  Yes;    Excessive energy intake  related to Food and nutrition related knowledge deficit concerning energy intake as evidenced by  BMI >25 for adults       Nutrition Intervention    Nutrition Prescription  Calories:2868-0138  Protein:73-92  Fluid:72    Meal Plan (Rico/Pro/Carb)  Breakfast: 400 rico (25g PRO)  Snack: 150-200 rico (10g PRO)  Lunch: 500 rico (30g PRO)  Snack: 150-200 rico (10g PRO)  Dinner: 500-600 rico (30g PRO)  Snack: -    Nutrition Education:    Healthy Core Manual  Calorie controlled menu  Lean protein food choices  Healthy snack options  Food journaling tips      Nutrition Counseling:  Strategies: meal planning, portion sizes, healthy snack choices, hydration, fiber intake, protein intake, exercise, food journal      Monitoring and Evaluation:  Evaluation criteria:  Energy Intake  Meet protein needs  Maintain adequate hydration  Monitor weekly weight  Meal planning/preparation  Food journal   Decreased portions at mealtimes and snacks  Physical activity     Barriers to learning:none  Readiness to change:  Action  Comprehension: very good  Expected Compliance: very good

## 2025-04-02 ENCOUNTER — APPOINTMENT (EMERGENCY)
Dept: RADIOLOGY | Facility: HOSPITAL | Age: 60
End: 2025-04-02
Payer: OTHER MISCELLANEOUS

## 2025-04-02 ENCOUNTER — APPOINTMENT (EMERGENCY)
Dept: CT IMAGING | Facility: HOSPITAL | Age: 60
End: 2025-04-02
Payer: OTHER MISCELLANEOUS

## 2025-04-02 ENCOUNTER — HOSPITAL ENCOUNTER (EMERGENCY)
Facility: HOSPITAL | Age: 60
Discharge: HOME | End: 2025-04-02
Payer: OTHER MISCELLANEOUS

## 2025-04-02 VITALS
HEART RATE: 71 BPM | SYSTOLIC BLOOD PRESSURE: 123 MMHG | OXYGEN SATURATION: 96 % | RESPIRATION RATE: 16 BRPM | TEMPERATURE: 98.5 F | DIASTOLIC BLOOD PRESSURE: 67 MMHG

## 2025-04-02 DIAGNOSIS — S93.409A ANKLE SPRAIN: ICD-10-CM

## 2025-04-02 DIAGNOSIS — R55 SYNCOPE: Primary | ICD-10-CM

## 2025-04-02 DIAGNOSIS — S82.831A: ICD-10-CM

## 2025-04-02 LAB
ALBUMIN SERPL BCG-MCNC: 4.2 G/DL (ref 3.5–5)
ALP SERPL-CCNC: 31 U/L (ref 34–104)
ALT SERPL W P-5'-P-CCNC: 18 U/L (ref 7–52)
ANION GAP SERPL CALCULATED.3IONS-SCNC: 6 MMOL/L (ref 4–13)
APTT PPP: 29 SECONDS (ref 23–34)
AST SERPL W P-5'-P-CCNC: 19 U/L (ref 13–39)
ATRIAL RATE: 70 BPM
BASOPHILS # BLD AUTO: 0.03 THOUSANDS/ÂΜL (ref 0–0.1)
BASOPHILS NFR BLD AUTO: 1 % (ref 0–1)
BILIRUB SERPL-MCNC: 0.46 MG/DL (ref 0.2–1)
BUN SERPL-MCNC: 14 MG/DL (ref 5–25)
CALCIUM SERPL-MCNC: 9.3 MG/DL (ref 8.4–10.2)
CARDIAC TROPONIN I PNL SERPL HS: <2 NG/L (ref ?–50)
CHLORIDE SERPL-SCNC: 107 MMOL/L (ref 96–108)
CO2 SERPL-SCNC: 26 MMOL/L (ref 21–32)
CREAT SERPL-MCNC: 0.92 MG/DL (ref 0.6–1.3)
EOSINOPHIL # BLD AUTO: 0.1 THOUSAND/ÂΜL (ref 0–0.61)
EOSINOPHIL NFR BLD AUTO: 2 % (ref 0–6)
ERYTHROCYTE [DISTWIDTH] IN BLOOD BY AUTOMATED COUNT: 12.5 % (ref 11.6–15.1)
GFR SERPL CREATININE-BSD FRML MDRD: 68 ML/MIN/1.73SQ M
GLUCOSE SERPL-MCNC: 93 MG/DL (ref 65–140)
HCT VFR BLD AUTO: 38.1 % (ref 34.8–46.1)
HGB BLD-MCNC: 12.7 G/DL (ref 11.5–15.4)
IMM GRANULOCYTES # BLD AUTO: 0.01 THOUSAND/UL (ref 0–0.2)
IMM GRANULOCYTES NFR BLD AUTO: 0 % (ref 0–2)
INR PPP: 0.95 (ref 0.85–1.19)
LYMPHOCYTES # BLD AUTO: 2.29 THOUSANDS/ÂΜL (ref 0.6–4.47)
LYMPHOCYTES NFR BLD AUTO: 41 % (ref 14–44)
MAGNESIUM SERPL-MCNC: 2.2 MG/DL (ref 1.9–2.7)
MCH RBC QN AUTO: 31.1 PG (ref 26.8–34.3)
MCHC RBC AUTO-ENTMCNC: 33.3 G/DL (ref 31.4–37.4)
MCV RBC AUTO: 93 FL (ref 82–98)
MONOCYTES # BLD AUTO: 0.54 THOUSAND/ÂΜL (ref 0.17–1.22)
MONOCYTES NFR BLD AUTO: 10 % (ref 4–12)
NEUTROPHILS # BLD AUTO: 2.66 THOUSANDS/ÂΜL (ref 1.85–7.62)
NEUTS SEG NFR BLD AUTO: 46 % (ref 43–75)
NRBC BLD AUTO-RTO: 0 /100 WBCS
P AXIS: 24 DEGREES
PLATELET # BLD AUTO: 228 THOUSANDS/UL (ref 149–390)
PMV BLD AUTO: 9.9 FL (ref 8.9–12.7)
POTASSIUM SERPL-SCNC: 4.5 MMOL/L (ref 3.5–5.3)
PR INTERVAL: 126 MS
PROT SERPL-MCNC: 7 G/DL (ref 6.4–8.4)
PROTHROMBIN TIME: 13.4 SECONDS (ref 12.3–15)
QRS AXIS: 32 DEGREES
QRSD INTERVAL: 84 MS
QT INTERVAL: 390 MS
QTC INTERVAL: 421 MS
RBC # BLD AUTO: 4.08 MILLION/UL (ref 3.81–5.12)
SODIUM SERPL-SCNC: 139 MMOL/L (ref 135–147)
T WAVE AXIS: 21 DEGREES
TSH SERPL DL<=0.05 MIU/L-ACNC: 0.52 UIU/ML (ref 0.45–4.5)
VENTRICULAR RATE: 70 BPM
WBC # BLD AUTO: 5.63 THOUSAND/UL (ref 4.31–10.16)

## 2025-04-02 PROCEDURE — 84443 ASSAY THYROID STIM HORMONE: CPT | Performed by: PHYSICIAN ASSISTANT

## 2025-04-02 PROCEDURE — 84484 ASSAY OF TROPONIN QUANT: CPT | Performed by: PHYSICIAN ASSISTANT

## 2025-04-02 PROCEDURE — 71046 X-RAY EXAM CHEST 2 VIEWS: CPT

## 2025-04-02 PROCEDURE — 99285 EMERGENCY DEPT VISIT HI MDM: CPT | Performed by: PHYSICIAN ASSISTANT

## 2025-04-02 PROCEDURE — 93010 ELECTROCARDIOGRAM REPORT: CPT | Performed by: STUDENT IN AN ORGANIZED HEALTH CARE EDUCATION/TRAINING PROGRAM

## 2025-04-02 PROCEDURE — 36415 COLL VENOUS BLD VENIPUNCTURE: CPT | Performed by: PHYSICIAN ASSISTANT

## 2025-04-02 PROCEDURE — 73590 X-RAY EXAM OF LOWER LEG: CPT

## 2025-04-02 PROCEDURE — 80053 COMPREHEN METABOLIC PANEL: CPT | Performed by: PHYSICIAN ASSISTANT

## 2025-04-02 PROCEDURE — 85730 THROMBOPLASTIN TIME PARTIAL: CPT | Performed by: PHYSICIAN ASSISTANT

## 2025-04-02 PROCEDURE — 93005 ELECTROCARDIOGRAM TRACING: CPT

## 2025-04-02 PROCEDURE — 99284 EMERGENCY DEPT VISIT MOD MDM: CPT

## 2025-04-02 PROCEDURE — 70450 CT HEAD/BRAIN W/O DYE: CPT

## 2025-04-02 PROCEDURE — 85610 PROTHROMBIN TIME: CPT | Performed by: PHYSICIAN ASSISTANT

## 2025-04-02 PROCEDURE — 96360 HYDRATION IV INFUSION INIT: CPT

## 2025-04-02 PROCEDURE — 73610 X-RAY EXAM OF ANKLE: CPT

## 2025-04-02 PROCEDURE — 83735 ASSAY OF MAGNESIUM: CPT | Performed by: PHYSICIAN ASSISTANT

## 2025-04-02 PROCEDURE — 96361 HYDRATE IV INFUSION ADD-ON: CPT

## 2025-04-02 PROCEDURE — 85025 COMPLETE CBC W/AUTO DIFF WBC: CPT | Performed by: PHYSICIAN ASSISTANT

## 2025-04-02 RX ORDER — OXYCODONE AND ACETAMINOPHEN 5; 325 MG/1; MG/1
1 TABLET ORAL ONCE
Refills: 0 | Status: COMPLETED | OUTPATIENT
Start: 2025-04-02 | End: 2025-04-02

## 2025-04-02 RX ADMIN — SODIUM CHLORIDE 1000 ML: 0.9 INJECTION, SOLUTION INTRAVENOUS at 09:58

## 2025-04-02 RX ADMIN — OXYCODONE HYDROCHLORIDE AND ACETAMINOPHEN 1 TABLET: 5; 325 TABLET ORAL at 10:23

## 2025-04-02 NOTE — Clinical Note
Maria Luz Ponce was seen and treated in our emergency department on 4/2/2025.    Occupational Medicine Follow Up Within 24 hours            Diagnosis:     Maria Luz  .    She may return on this date:          If you have any questions or concerns, please don't hesitate to call.      Julie Lynn Gutzweiler, PA-C    ______________________________           _______________          _______________  Hospital Representative                              Date                                Time

## 2025-04-02 NOTE — ED PROVIDER NOTES
Time reflects when diagnosis was documented in both MDM as applicable and the Disposition within this note       Time User Action Codes Description Comment    4/2/2025 12:03 PM Gutzweiler, Julie Add [R55] Syncope     4/2/2025 12:05 PM Gutzweiler, Julie Add [S13.4XXA] Acute sprain of ligament of cervical spine     4/2/2025 12:05 PM Gutzweiler, Julie Remove [S13.4XXA] Acute sprain of ligament of cervical spine     4/2/2025 12:05 PM Gutzweiler, Julie Add [S93.409A] Ankle sprain     4/2/2025 12:05 PM Gutzweiler, Julie Modify [S93.409A] Ankle sprain Avulsion fracture lateral, medial, lateral talus most likely secondary to a bony component of the sprain.    4/2/2025 12:06 PM Gutzweiler, Julie Add [S82.831A] Fracture of right proximal fibula           ED Disposition       ED Disposition   Discharge    Condition   Stable    Date/Time   Wed Apr 2, 2025 12:03 PM    Comment   Maria Luz Ponce discharge to home/self care.                   Assessment & Plan       Medical Decision Making  This 59-year-old female presents emergency room with a past medical history of an ankle fracture, asthma, cervical spine stenosis, close left arm fracture, concussion, fibromyalgia, hypertension, kidney stones, migraines, mixed hyperlipidemia, hypertension, seasonal allergies, shingles, vitamin D deficiency.  She presents for having a syncopal episode.  She states she she just arrived to work here at [employer].  She is stood up and felt lightheaded and the next thing she knew she was on the floor.  It was witnessed.  There is no head strike.  Patient denies any headaches, double, blurred vision.  She denies any nausea or vomiting.  She denies any black tarry stools or bright red blood per rectum.  He complains of a pain over the lateral aspect of her lower leg.  It radiates to her right ankle.  She complains of weakness at that site and pain with ambulation.  She denies any numbness, or tingling.  She denies any chest pain or  shortness of breath.  She denies any palpitations in her chest.  She denies any abdominal pain.  She is postmenopausal.  She was able to get up and ambulate but complains of pain in the right leg.  She had a similar episode in October and was seen in the emergency room with a normal exam.  She said that the last time she had a syncopal episode.  Her doctor decreased her valsartan from 160 mg to 80 mg/day.  Patient states that she ate breakfast this morning.  She had hot apple cider and a banana prior to work.  She states that she recently stopped drinking tea as it has been causing kidney stones.    Physical exam this 59-year-old female is alert and oriented x 3.  She is in no acute distress. Head is atraumatic upon inspection or palpation.  Her neck is supple upon palpation.  There is no lymphadenopathy or nuchal rigidity.  Her lungs are clear to auscultation.  Her heart is regular rate and rhythm without murmur.  Her abdomen is soft nondistended nontender without mass or hepatosplenomegaly.  She moves her upper and lower extremities freely.  She does have tenderness palpated over the proximal one third of the fibula.  This is extended down to the right ankle over the lateral malleolus.  The leg is aligned.  There is no soft tissue swelling present.  She has palpable pulses over the foot over the dorsalis pedis and posterior tibial arteries.  Her capillary refills less than 2 seconds.    Differential diagnosis is not limited to vasovagal syncope, cardiogenic syncope, neurogenic syncope, arrhythmia, pulmonary embolism, acute coronary syndrome, hypothyroidism, hyperthyroidism, hypertension, metabolic abnormality, anemia, intracerebral hemorrhage, mass, Maisonneuve fracture right leg, fracture isolated to the fibula.  Ankle sprain, knee sprain.    Laboratory studies were taken and were reviewed.  EKG did not show any ectopy, arrhythmia, acute signs of ischemia.  It was essentially within normal limits.  Her chest  x-ray does not demonstrate any acute cardiopulmonary disease.  Her CT of her head was negative for any acute process.  X-rays of the right tib-fib demonstrate a spiral fracture through the proximal right fibula.  Patient has avulsion fractures off of the lateral and medial malleolus and lateral talus avulsion.  This is suggesting a bony component to a sprain of her ankle.  Patient had an echocardiogram in 2021 that was normal.  Her ejection fraction was 60%.    Impression  Acute ankle sprain  Fracture of the right proximal fibula  Syncope    Plan  Patient is to follow-up with her family physician in regards to her syncope.  She was given a referral to orthopedics to follow-up for her fracture of her proximal fibula and ankle sprain  She was placed in a cam walker to be nonweightbearing until the orthopedic doctors clears her talus.  Restricted from work until she is evaluated by orthopedics.  She will call follow-up  with [facility].  She is to call and arrange an appointment for recheck in 24 hours as this is a Workmen's Comp. injury.        Amount and/or Complexity of Data Reviewed  Labs: ordered.     Details: Laboratory studies were taken and were reviewed.  Radiology: ordered and independent interpretation performed.     Details: Spiral fracture of the proximal right fibula, avulsion fragments of the lateral malleolus medial malleolus and the lateral talus.  This is mostly bony component to sprain of the fibular talar ligament  ECG/medicine tests: ordered and independent interpretation performed.     Details: Normal sinus rhythm, no ischemia, no ectopy, normal axis no arrhythmia.  Independently interpreted by me    Risk  Prescription drug management.        ED Course as of 04/02/25 2042 Wed Apr 02, 2025   1138 [place] syncope rule-0       Medications   sodium chloride 0.9 % bolus 1,000 mL (0 mL Intravenous Stopped 4/2/25 1237)   oxyCODONE-acetaminophen (PERCOCET) 5-325 mg per tablet 1 tablet (1 tablet Oral  Given 4/2/25 1023)       ED Risk Strat Scores   HEART Risk Score      Flowsheet Row Most Recent Value   Heart Score Risk Calculator    History 0 Filed at: 04/02/2025 1138   ECG 0 Filed at: 04/02/2025 1138   Age 1 Filed at: 04/02/2025 1138   Risk Factors 1 Filed at: 04/02/2025 1138   Troponin 0 Filed at: 04/02/2025 1138   HEART Score 2 Filed at: 04/02/2025 1138          HEART Risk Score      Flowsheet Row Most Recent Value   Heart Score Risk Calculator    History 0 Filed at: 04/02/2025 1138   ECG 0 Filed at: 04/02/2025 1138   Age 1 Filed at: 04/02/2025 1138   Risk Factors 1 Filed at: 04/02/2025 1138   Troponin 0 Filed at: 04/02/2025 1138   HEART Score 2 Filed at: 04/02/2025 1138                                                  History of Present Illness       Chief Complaint   Patient presents with    Syncope     Patient reports she stood up, believes BP dropped and she had syncopal episode. States it was witnessed and she woke up right away. Denies head strike, thinners, ASA. States she did twist right lower leg and reports pain now from knee down to ankle       Past Medical History:   Diagnosis Date    Ankle fracture     Asthma     Cervical spinal stenosis     Closed left arm fracture, sequela     Concussion     Degeneration, intervertebral disc, cervical     Fibromyalgia, primary     Hypertension     Kidney stone     Migraine     Mixed hyperlipidemia 01/05/2021    Right arm fracture     Seasonal allergies     Shingles     Vitamin D deficiency       Past Surgical History:   Procedure Laterality Date    CARPAL TUNNEL RELEASE Bilateral     CATARACT EXTRACTION Bilateral     FL RETROGRADE PYELOGRAM  09/13/2024    FL RETROGRADE PYELOGRAM  10/25/2024    MOUTH SURGERY      NH COLONOSCOPY FLX DX W/COLLJ SPEC WHEN PFRMD N/A 12/27/2018    Procedure: COLONOSCOPY;  Surgeon: Bita Montalvo MD;  Location: [facility];  Service: Gastroenterology    NH CYSTO/URETERO W/LITHOTRIPSY &INDWELL STENT INSRT Left 09/13/2024    Procedure:  CYSTOSCOPY URETEROSCOPY WITH RETROGRADE PYELOGRAM AND INSERTION STENT URETERAL;  Surgeon: Eder Johns MD;  Location: [facility];  Service: Urology    OR CYSTO/URETERO W/LITHOTRIPSY &INDWELL STENT INSRT Left 10/25/2024    Procedure: CYSTO USCOPE, RETROGRADE PYLEOGRAM WITH BASKET EXTRACTION OF STONE;  Surgeon: Eder Johns MD;  Location[facility];  Service: Urology    OR LAPAROSCOPY W TOTAL HYSTERECTOMY UTERUS 250 GM/< N/A 11/13/2024    Procedure: TOTAL LAPAROSCOPIC HYSTERECTOMY., BILATERAL SALPINGOOPHORECTOMY, CYSTOSCOPY WITH INSERTION URETERAL CATHETER;  Surgeon: Nahun Ricardo MD;  Location[facility];  Service: Gynecology Oncology    SINUS SURGERY      TUBAL LIGATION        Family History   Problem Relation Age of Onset    Heart disease Father     No Known Problems Daughter     No Known Problems Daughter     No Known Problems Daughter     No Known Problems Maternal Grandmother     No Known Problems Maternal Grandfather     No Known Problems Paternal Grandmother     No Known Problems Paternal Grandfather     Uterine cancer Maternal Aunt 75    Lung cancer Paternal Aunt         age unknown      Social History     Tobacco Use    Smoking status: Never    Smokeless tobacco: Never   Vaping Use    Vaping status: Never Used   Substance Use Topics    Alcohol use: Not Currently     Comment: social    Drug use: No      E-Cigarette/Vaping    E-Cigarette Use Never User       E-Cigarette/Vaping Substances    Nicotine No     THC No     CBD No     Flavoring No     Other No     Unknown No       I have reviewed and agree with the history as documented.       History provided by:  Patient  Syncope  Episode history:  Single  Most recent episode:  Today  Duration:  10 seconds  Timing:  Intermittent  Progression:  Unchanged  Chronicity:  New  Context: standing up    Context: not blood draw, not bowel movement, not dehydration, not exertion, not inactivity, not medication change, not with normal activity, not  sight of blood, not sitting down and not urination    Witnessed: yes    Relieved by:  Bed rest  Worsened by:  Nothing  Associated symptoms: no anxiety, no chest pain, no confusion, no diaphoresis, no difficulty breathing, no dizziness, no fever, no focal sensory loss, no focal weakness, no headaches, no malaise/fatigue, no nausea, no palpitations, no recent fall, no recent injury, no recent surgery, no rectal bleeding, no seizures, no shortness of breath, no visual change, no vomiting and no weakness    Risk factors: no congenital heart disease, no coronary artery disease, no seizures and no vascular disease        Review of Systems   Constitutional:  Positive for activity change. Negative for appetite change, chills, diaphoresis, fever and malaise/fatigue.   HENT:  Negative for congestion, ear discharge, ear pain, mouth sores, postnasal drip, rhinorrhea and sore throat.    Eyes:  Negative for pain, discharge, redness and itching.   Respiratory:  Negative for chest tightness and shortness of breath.    Cardiovascular:  Positive for syncope. Negative for chest pain and palpitations.   Gastrointestinal:  Negative for abdominal pain, nausea and vomiting.   Genitourinary:  Negative for dysuria, frequency, hematuria and urgency.   Musculoskeletal:  Positive for arthralgias and gait problem. Negative for back pain and joint swelling.   Skin:  Negative for color change, pallor, rash and wound.   Neurological:  Negative for dizziness, focal weakness, seizures, weakness and headaches.   Psychiatric/Behavioral:  Negative for confusion.    All other systems reviewed and are negative.          Objective       ED Triage Vitals   Temperature Pulse Blood Pressure Respirations SpO2 Patient Position - Orthostatic VS   04/02/25 0839 04/02/25 0839 04/02/25 0839 04/02/25 0839 04/02/25 0839 04/02/25 0839   98.5 °F (36.9 °C) 84 119/71 18 97 % Sitting      Temp Source Heart Rate Source BP Location FiO2 (%) Pain Score    04/02/25 0839  04/02/25 0839 04/02/25 0839 -- 04/02/25 1021    Oral Monitor Right arm  7      Vitals      Date and Time Temp Pulse SpO2 Resp BP Pain Score FACES Pain Rating User   04/02/25 1100 -- 71 96 % -- 123/67 -- -- LAB   04/02/25 1021 -- 69 94 % 16 111/61 7 -- LAB   04/02/25 0839 98.5 °F (36.9 °C) 84 97 % 18 119/71 -- -- BS            Physical Exam  Vitals and nursing note reviewed.   Constitutional:       General: She is not in acute distress.     Appearance: Normal appearance. She is obese. She is not ill-appearing, toxic-appearing or diaphoretic.   HENT:      Head: Normocephalic and atraumatic.      Right Ear: External ear normal.      Left Ear: External ear normal.      Nose: Nose normal. No congestion or rhinorrhea.      Mouth/Throat:      Pharynx: No oropharyngeal exudate or posterior oropharyngeal erythema.   Eyes:      General:         Right eye: No discharge.         Left eye: No discharge.      Conjunctiva/sclera: Conjunctivae normal.   Cardiovascular:      Rate and Rhythm: Normal rate and regular rhythm.      Heart sounds: Normal heart sounds.   Pulmonary:      Effort: Pulmonary effort is normal.      Breath sounds: Normal breath sounds.   Abdominal:      General: There is no distension.      Palpations: Abdomen is soft.      Tenderness: There is no abdominal tenderness. There is no guarding or rebound.   Genitourinary:     Vagina: No vaginal discharge.   Musculoskeletal:         General: Tenderness present.      Cervical back: Neck supple.      Right lower leg: No edema.      Left lower leg: No edema.      Comments: Upon inspection of the right lower extremity.  He is atraumatic upon inspection.  There is no tenderness palpated over the knee.  She has tenderness over the proximal one third of her fibula.  This reproduces her symptoms.  She has some generalized tenderness over her ankle.  She is given range of motion and then eat ankle in all planes.  She does have some pain referred to the case because proximal  one third of her fibula with flexion of the knee.  Collateral ligaments are intact.  Negative Lachman sign.  Good range of motion of the ankle.  Patient has palpable pulses over the dorsalis pedis and posterior tibial arteries.  Her capillary refills less than 2 seconds   Skin:     General: Skin is warm.      Capillary Refill: Capillary refill takes less than 2 seconds.   Neurological:      General: No focal deficit present.      Mental Status: She is alert and oriented to person, place, and time.      Gait: Gait abnormal.   Psychiatric:         Mood and Affect: Mood normal.         Behavior: Behavior normal.         Thought Content: Thought content normal.         Judgment: Judgment normal.         Results Reviewed       Procedure Component Value Units Date/Time    TSH, 3rd generation with Free T4 reflex [[id number]]  (Normal) Collected: 04/02/25 0956    Lab Status: Final result Specimen: Blood from Arm, Right Updated: 04/02/25 1037     TSH 3RD GENERATON 0.516 uIU/mL     HS Troponin 0hr (reflex protocol) [[id number]]  (Normal) Collected: 04/02/25 0956    Lab Status: Final result Specimen: Blood from Arm, Right Updated: 04/02/25 1028     hs TnI 0hr <2 ng/L     Comprehensive metabolic panel [[id number]]  (Abnormal) Collected: 04/02/25 0956    Lab Status: Final result Specimen: Blood from Arm, Right Updated: 04/02/25 1027     Sodium 139 mmol/L      Potassium 4.5 mmol/L      Chloride 107 mmol/L      CO2 26 mmol/L      ANION GAP 6 mmol/L      BUN 14 mg/dL      Creatinine 0.92 mg/dL      Glucose 93 mg/dL      Calcium 9.3 mg/dL      AST 19 U/L      ALT 18 U/L      Alkaline Phosphatase 31 U/L      Total Protein 7.0 g/dL      Albumin 4.2 g/dL      Total Bilirubin 0.46 mg/dL      eGFR 68 ml/min/1.73sq m     Narrative:      National Kidney Disease Foundation guidelines for Chronic Kidney Disease (CKD):     Stage 1 with normal or high GFR (GFR > 90 mL/min/1.73 square meters)    Stage 2 Mild CKD (GFR = 60-89 mL/min/1.73 square  meters)    Stage 3A Moderate CKD (GFR = 45-59 mL/min/1.73 square meters)    Stage 3B Moderate CKD (GFR = 30-44 mL/min/1.73 square meters)    Stage 4 Severe CKD (GFR = 15-29 mL/min/1.73 square meters)    Stage 5 End Stage CKD (GFR <15 mL/min/1.73 square meters)  Note: GFR calculation is accurate only with a steady state creatinine    Magnesium [[id number]]  (Normal) Collected: 04/02/25 0956    Lab Status: Final result Specimen: Blood from Arm, Right Updated: 04/02/25 1027     Magnesium 2.2 mg/dL     Protime-INR [[id number]]  (Normal) Collected: 04/02/25 0956    Lab Status: Final result Specimen: Blood from Arm, Right Updated: 04/02/25 1019     Protime 13.4 seconds      INR 0.95    Narrative:      INR Therapeutic Range    Indication                                             INR Range      Atrial Fibrillation                                               2.0-3.0  Hypercoagulable State                                    2.0.2.3  Left Ventricular Asist Device                            2.0-3.0  Mechanical Heart Valve                                  -    Aortic(with afib, MI, embolism, HF, LA enlargement,    and/or coagulopathy)                                     2.0-3.0 (2.5-3.5)     Mitral                                                             2.5-3.5  Prosthetic/Bioprosthetic Heart Valve               2.0-3.0  Venous thromboembolism (VTE: VT, PE        2.0-3.0    APTT [[id number]]  (Normal) Collected: 04/02/25 0956    Lab Status: Final result Specimen: Blood from Arm, Right Updated: 04/02/25 1019     PTT 29 seconds     CBC and differential [[id number]] Collected: 04/02/25 0956    Lab Status: Final result Specimen: Blood from Arm, Right Updated: 04/02/25 1007     WBC 5.63 Thousand/uL      RBC 4.08 Million/uL      Hemoglobin 12.7 g/dL      Hematocrit 38.1 %      MCV 93 fL      MCH 31.1 pg      MCHC 33.3 g/dL      RDW 12.5 %      MPV 9.9 fL      Platelets 228 Thousands/uL      nRBC 0 /100 WBCs      Segmented % 46 %       Immature Grans % 0 %      Lymphocytes % 41 %      Monocytes % 10 %      Eosinophils Relative 2 %      Basophils Relative 1 %      Absolute Neutrophils 2.66 Thousands/µL      Absolute Immature Grans 0.01 Thousand/uL      Absolute Lymphocytes 2.29 Thousands/µL      Absolute Monocytes 0.54 Thousand/µL      Eosinophils Absolute 0.10 Thousand/µL      Basophils Absolute 0.03 Thousands/µL             XR ankle 3+ views RIGHT   ED Interpretation by Julie Lynn Gutzweiler, PA-C (04/02 1202)   Avulsion fracture off the distal fibula, medial malleolus and the lateral aspect of the talus.  This likely secondary to a sprain of the fibulotalar ligament.  This is an intact.  There is an old avulsion fragment      Final Interpretation by Sorin Burgos MD (04/02 1436)      Small bony fragment seen adjacent to the medial and lateral malleolar tips and lateral to the talocalcaneal region, all potentially being acute avulsion fragments. This might be further clarified with CT of the ankle if deemed clinically appropriate for    management. ER is aware         Computerized Assisted Algorithm (CAA) may have been used to analyze all applicable images.               Workstation performed: [id number]         CT head without contrast   Final Interpretation by Jaziel Manzo MD (04/02 1056)      No acute intracranial abnormality.                  Workstation performed: [id number]         XR tibia fibula 2 views RIGHT   ED Interpretation by Julie Lynn Gutzweiler, PA-C (04/02 1131)   Needed fracture of the right proximal fibula.  There is an avulsion of bone on the lateral posterior aspect of the ankle on the tib-fib.  Will get a dedicated ankle series      Final Interpretation by Chad Walker DO (04/02 1117)      Oblique proximal right fibular fracture. Consider dedicated imaging of the ankle in light of the history of lateral malleolus pain.            Computerized Assisted Algorithm (CAA) may have been used to analyze all  applicable images.               Workstation performed: [id number]         XR chest 2 views   Final Interpretation by Sandy Keller MD (04/02 1115)      No acute cardiopulmonary disease.            Workstation performed: [id number]             Procedures    ED Medication and Procedure Management   Prior to Admission Medications   Prescriptions Last Dose Informant Patient Reported? Taking?   Cholecalciferol (VITAMIN D3) 3000 units TABS  Self Yes No   Sig: Take 3,000 Units by mouth daily   DULoxetine (CYMBALTA) 60 mg delayed release capsule  Self Yes No   Sig: Take 60 mg by mouth daily   Diclofenac Sodium (VOLTAREN) 1 %  Self Yes No   Sig: Apply 2 g topically if needed   Omega-3 1000 MG CAPS  Self Yes No   Sig: Take 1 capsule by mouth daily   Probiotic Product (PROBIOTIC BLEND PO)   Yes No   Sig: Take 1 capsule by mouth in the morning   TART CHERRY PO   Yes No   Sig: Take 2 capsules by mouth 2 (two) times a day   acetaminophen (TYLENOL) 500 mg tablet   Yes No   Sig: Take 1,000 mg by mouth every 6 (six) hours as needed for mild pain   albuterol (PROVENTIL HFA,VENTOLIN HFA) 90 mcg/act inhaler  Self Yes No   Sig: Inhale 2 puffs if needed   dicyclomine (BENTYL) 10 mg capsule  Self No No   Sig: TAKE 1 CAPSULE (10 MG TOTAL) BY MOUTH 3 (THREE) TIMES A DAY AS NEEDED (DIARRHEA AND ABDOMINAL PAIN)   famotidine (PEPCID) 10 mg tablet  Self Yes No   Sig: Take 10 mg by mouth in the morning   fexofenadine (ALLEGRA) 180 MG tablet  Self Yes No   Sig: Take 180 mg by mouth as needed   fluticasone (FLONASE) 50 mcg/act nasal spray  Self No No   Sig: SPRAY 2 SPRAYS INTO EACH NOSTRIL 2 TIMES A DAY   fluticasone-salmeterol (ADVAIR, WIXELA) 100-50 mcg/dose inhaler  Self Yes No   Sig: Inhale 1 puff 2 (two) times a day   gabapentin (NEURONTIN) 100 mg capsule  Self Yes No   Sig: Take 200 mg by mouth daily at bedtime   ipratropium (ATROVENT) 0.06 % nasal spray  Self No No   Sig: SPRAY 2 SPRAYS INTO EACH NOSTRIL 3 TIMES A DAY.   Patient  taking differently: 1 spray into each nostril as needed   lidocaine (LIDODERM) 5 %   Yes No   Sig: APPLY TO AREA OF PAIN FOR UP TO 12 HOURS PER DAY, MAY CUT TO SIZE NEEDED   Patient not taking: Reported on 2025   melatonin 3 mg  Self Yes No   Sig: Take 3 mg by mouth daily at bedtime   meloxicam (MOBIC) 15 mg tablet  Self Yes No   Sig: Take 15 mg by mouth daily   methocarbamol (ROBAXIN) 750 mg tablet  Self Yes No   Sig: Take 750 mg by mouth if needed   ondansetron (ZOFRAN) 4 mg tablet   Yes No   Patient not taking: Reported on 2025   sodium chloride (OCEAN) 0.65 % nasal spray  Self No No   Si sprays into each nostril every 2 (two) hours while awake   Patient taking differently: 2 sprays into each nostril as needed   tirzepatide (Zepbound) 7.5 mg/0.5 mL auto-injector   No No   Sig: Inject 0.5 mL (7.5 mg total) under the skin once a week   traMADol (ULTRAM) 50 mg tablet   Yes No   Sig: Take 50 mg by mouth every 6 (six) hours as needed for moderate pain   valsartan (DIOVAN) 160 mg tablet  Self Yes No   Sig: Take 160 mg by mouth daily      Facility-Administered Medications: None     Discharge Medication List as of 2025 12:10 PM        CONTINUE these medications which have NOT CHANGED    Details   acetaminophen (TYLENOL) 500 mg tablet Take 1,000 mg by mouth every 6 (six) hours as needed for mild pain, Historical Med      albuterol (PROVENTIL HFA,VENTOLIN HFA) 90 mcg/act inhaler Inhale 2 puffs if needed, Starting Wed 2017, Historical Med      Cholecalciferol (VITAMIN D3) 3000 units TABS Take 3,000 Units by mouth daily, Historical Med      Diclofenac Sodium (VOLTAREN) 1 % Apply 2 g topically if needed, Historical Med      dicyclomine (BENTYL) 10 mg capsule TAKE 1 CAPSULE (10 MG TOTAL) BY MOUTH 3 (THREE) TIMES A DAY AS NEEDED (DIARRHEA AND ABDOMINAL PAIN), Starting Thu 2024, Until Mon 2025 at 2359, Normal      DULoxetine (CYMBALTA) 60 mg delayed release capsule Take 60 mg by mouth daily,  Starting Sun 5/12/2024, Historical Med      famotidine (PEPCID) 10 mg tablet Take 10 mg by mouth in the morning, Historical Med      fexofenadine (ALLEGRA) 180 MG tablet Take 180 mg by mouth as needed, Historical Med      fluticasone (FLONASE) 50 mcg/act nasal spray SPRAY 2 SPRAYS INTO EACH NOSTRIL 2 TIMES A DAY, Normal      fluticasone-salmeterol (ADVAIR, WIXELA) 100-50 mcg/dose inhaler Inhale 1 puff 2 (two) times a day, Starting Tue 12/22/2020, Until Tue 12/10/2024, Historical Med      gabapentin (NEURONTIN) 100 mg capsule Take 200 mg by mouth daily at bedtime, Starting Tue 4/23/2024, Historical Med      ipratropium (ATROVENT) 0.06 % nasal spray SPRAY 2 SPRAYS INTO EACH NOSTRIL 3 TIMES A DAY., Normal      lidocaine (LIDODERM) 5 % APPLY TO AREA OF PAIN FOR UP TO 12 HOURS PER DAY, MAY CUT TO SIZE NEEDED, Historical Med      melatonin 3 mg Take 3 mg by mouth daily at bedtime, Historical Med      meloxicam (MOBIC) 15 mg tablet Take 15 mg by mouth daily, Starting Tue 8/14/2018, Historical Med      methocarbamol (ROBAXIN) 750 mg tablet Take 750 mg by mouth if needed, Historical Med      Omega-3 1000 MG CAPS Take 1 capsule by mouth daily, Historical Med      ondansetron (ZOFRAN) 4 mg tablet Historical Med      Probiotic Product (PROBIOTIC BLEND PO) Take 1 capsule by mouth in the morning, Historical Med      sodium chloride (OCEAN) 0.65 % nasal spray 2 sprays into each nostril every 2 (two) hours while awake, Starting Tue 1/3/2023, Until Fri 10/4/2024, Normal      TART CHERRY PO Take 2 capsules by mouth 2 (two) times a day, Historical Med      tirzepatide (Zepbound) 7.5 mg/0.5 mL auto-injector Inject 0.5 mL (7.5 mg total) under the skin once a week, Starting Tue 2/18/2025, Until Mon 5/19/2025, Normal      traMADol (ULTRAM) 50 mg tablet Take 50 mg by mouth every 6 (six) hours as needed for moderate pain, Historical Med      valsartan (DIOVAN) 160 mg tablet Take 160 mg by mouth daily, Starting Tue 9/10/2024, Until Wed  9/10/2025, Historical Med           No discharge procedures on file.  ED SEPSIS DOCUMENTATION   Time reflects when diagnosis was documented in both MDM as applicable and the Disposition within this note       Time User Action Codes Description Comment    4/2/2025 12:03 PM Gutzweiler, Julie Add [R55] Syncope     4/2/2025 12:05 PM Gutzweiler, Julie Add [S13.4XXA] Acute sprain of ligament of cervical spine     4/2/2025 12:05 PM Gutzweiler, Julie Remove [S13.4XXA] Acute sprain of ligament of cervical spine     4/2/2025 12:05 PM Gutzweiler, Julie Add [S93.409A] Ankle sprain     4/2/2025 12:05 PM Gutzweiler, Julie Modify [S93.409A] Ankle sprain Avulsion fracture lateral, medial, lateral talus most likely secondary to a bony component of the sprain.    4/2/2025 12:06 PM Gutzweiler, Julie Add [S82.831A] Fracture of right proximal fibula                  Julie Lynn Gutzweiler, PA-C  04/02/25 2001       Julie Lynn Gutzweiler, PA-C  04/02/25 2042

## 2025-04-02 NOTE — DISCHARGE INSTRUCTIONS
Ambulate nonweightbearing on the right lower extremity.    Please follow-up with orthopedics for recheck exam.    No work until cleared by Ortho.

## 2025-04-03 ENCOUNTER — TELEPHONE (OUTPATIENT)
Age: 60
End: 2025-04-03

## 2025-04-03 ENCOUNTER — APPOINTMENT (OUTPATIENT)
Dept: URGENT CARE | Facility: MEDICAL CENTER | Age: 60
End: 2025-04-03
Payer: OTHER MISCELLANEOUS

## 2025-04-03 PROCEDURE — 99214 OFFICE O/P EST MOD 30 MIN: CPT

## 2025-04-03 NOTE — TELEPHONE ENCOUNTER
Patient is an employee of [employer] who was injured at work. She prefers to see Dr Swan, will do either location. Is aware provider will be OOO, can she be forced on for either 4/9 or 4/10    Hello,    Please advise if a forced appointment can be accommodated for the patient:    Call back #: [phone]    Insurance:     Reason for appointment: Fracture of right proximal fibula/ Avulsion fracture lateral, medial, lateral talus     Requested doctor and/or location: Dr Swan-either location       Thank you.

## 2025-04-04 ENCOUNTER — APPOINTMENT (OUTPATIENT)
Dept: LAB | Facility: MEDICAL CENTER | Age: 60
End: 2025-04-04

## 2025-04-04 DIAGNOSIS — Z00.6 ENCOUNTER FOR EXAMINATION FOR NORMAL COMPARISON OR CONTROL IN CLINICAL RESEARCH PROGRAM: ICD-10-CM

## 2025-04-04 PROCEDURE — 36415 COLL VENOUS BLD VENIPUNCTURE: CPT

## 2025-04-10 ENCOUNTER — OFFICE VISIT (OUTPATIENT)
Dept: OBGYN CLINIC | Facility: CLINIC | Age: 60
End: 2025-04-10
Payer: OTHER MISCELLANEOUS

## 2025-04-10 VITALS — BODY MASS INDEX: 31.18 KG/M2 | HEIGHT: 66 IN | WEIGHT: 194 LBS

## 2025-04-10 DIAGNOSIS — S82.891A CLOSED AVULSION FRACTURE OF RIGHT ANKLE, INITIAL ENCOUNTER: ICD-10-CM

## 2025-04-10 DIAGNOSIS — S82.831A OTHER CLOSED FRACTURE OF PROXIMAL END OF RIGHT FIBULA, INITIAL ENCOUNTER: Primary | ICD-10-CM

## 2025-04-10 PROCEDURE — 99204 OFFICE O/P NEW MOD 45 MIN: CPT | Performed by: STUDENT IN AN ORGANIZED HEALTH CARE EDUCATION/TRAINING PROGRAM

## 2025-04-10 NOTE — LETTER
April 10, 2025     Patient: Maria Luz Ponce  YOB: 1965  Date of Visit: 4/10/2025      To Whom it May Concern:    Maria Luz Ponce is under my professional care. Maria Luz was seen in my office on 4/10/2025. Maria Luz may return to work on 4/14/2025 with use of CAM boot and scooter/crutches. Her work status will be re-evaluated at her next follow up in 4 weeks .    If you have any questions or concerns, please don't hesitate to call.         Sincerely,          Nico Swan, DO        CC: No Recipients

## 2025-04-10 NOTE — Clinical Note
April 10, 2025     Self Self    Patient: Maria Luz Ponce   YOB: 1965   Date of Visit: 4/10/2025       Dear Dr. Elizabeth Self:    Thank you for referring Maria Luz Ponce to me for evaluation. Below are my notes for this consultation.    If you have questions, please do not hesitate to call me. I look forward to following your patient along with you.         Sincerely,        Nico Swan, DO        CC: No Recipients  Thee Morenita  4/10/2025  2:23 PM  Incomplete  [facility] [place]  [address]  [facility] [address]  [phone]       Maria Luz Ponce  [id number]  1965    ORTHOPAEDIC SURGERY OUTPATIENT NOTE  4/10/2025        :  Assessment & Plan        {[facility] PLAN:85101}  Follow-up:  No follow-ups on file.    The patient's diagnosis and treatment were discussed at length today. We discussed no treatment, non-operative treatment, and operative treatment.    Procedures  {NO PROCDOC:98555}    Translation: N/A    HISTORY:  59 y.o. female  whose occupation is {Occupation social history md:17992} referred to me by {PCPERURGENT:90066}, seen in clinic for {consultation evalulation:20032} of {RIGHT/LEFT:20294} ankle pain. ***    Surgical History:  None    Past Medical History:   Diagnosis Date   • Ankle fracture    • Asthma    • Cervical spinal stenosis    • Closed left arm fracture, sequela    • Concussion    • Degeneration, intervertebral disc, cervical    • Fibromyalgia, primary    • Hypertension    • Kidney stone    • Migraine    • Mixed hyperlipidemia 01/05/2021   • Right arm fracture    • Seasonal allergies    • Shingles    • Vitamin D deficiency        Past Surgical History:   Procedure Laterality Date   • CARPAL TUNNEL RELEASE Bilateral    • CATARACT EXTRACTION Bilateral    • FL RETROGRADE PYELOGRAM  09/13/2024   • FL RETROGRADE PYELOGRAM  10/25/2024   • MOUTH SURGERY     • MT COLONOSCOPY FLX DX W/COLLJ SPEC WHEN PFRMD N/A  12/27/2018    Procedure: COLONOSCOPY;  Surgeon: Bita Montalvo MD;  [facility];  Service: Gastroenterology   • WV CYSTO/URETERO W/LITHOTRIPSY &INDWELL STENT INSRT Left 09/13/2024    Procedure: CYSTOSCOPY URETEROSCOPY WITH RETROGRADE PYELOGRAM AND INSERTION STENT URETERAL;  Surgeon: Eder Johns MD;  [facility];  Service: Urology   • WV CYSTO/URETERO W/LITHOTRIPSY &INDWELL STENT INSRT Left 10/25/2024    Procedure: CYSTO USCOPE, RETROGRADE PYLEOGRAM WITH BASKET EXTRACTION OF STONE;  Surgeon: Eder Johns MD;  [facility];  Service: Urology   • WV LAPAROSCOPY W TOTAL HYSTERECTOMY UTERUS 250 GM/< N/A 11/13/2024    Procedure: TOTAL LAPAROSCOPIC HYSTERECTOMY., BILATERAL SALPINGOOPHORECTOMY, CYSTOSCOPY WITH INSERTION URETERAL CATHETER;  Surgeon: Nahun Ricardo MD;  [facility];  Service: Gynecology Oncology   • SINUS SURGERY     • TUBAL LIGATION         Social History     Socioeconomic History   • Marital status: /Civil Union     Spouse name: Not on file   • Number of children: Not on file   • Years of education: Not on file   • Highest education level: Not on file   Occupational History   • Not on file   Tobacco Use   • Smoking status: Never   • Smokeless tobacco: Never   Vaping Use   • Vaping status: Never Used   Substance and Sexual Activity   • Alcohol use: Not Currently     Comment: social   • Drug use: No   • Sexual activity: Yes     Partners: Male     Birth control/protection: Female Sterilization   Other Topics Concern   • Not on file   Social History Narrative   • Not on file     Social Drivers of Health     Financial Resource Strain: Not At Risk (2/24/2025)    Received from [facility]    Financial Insecurity    • In the last 12 months did you skip medications to save money?: No    • In the last 12 months was there a time when you needed to see a doctor but could not because of cost?: No   Food Insecurity: No Food Insecurity  (2/24/2025)    Received from [facility]    Food Insecurity    • In the last 12 months did you ever eat less than you felt you should because there wasn't enough money for food?: No   Transportation Needs: No Transportation Needs (2/24/2025)    Received from [facility]    Transportation Needs    • In the last 12 months have you ever had to go without healthcare because you didn't have a way to get there?: No   Physical Activity: Not on file   Stress: No Stress Concern Present (2/20/2024)    Received from [facility], [facility]    [employer] - Occupational Stress Questionnaire    • Feeling of Stress : Only a little   Social Connections: Socially Integrated (2/24/2025)    Received from [facility]    Social Connection    • Do you often feel lonely?: No   Intimate Partner Violence: Unknown (10/23/2024)    Nursing [facility]PS    • Feels Physically and Emotionally Safe: Not on file    • Physically Hurt by Someone: Not on file    • Humiliated or Emotionally Abused by Someone: Not on file    • Physically Hurt by Someone: No    • Hurt or Threatened by Someone: No   Housing Stability: Not At Risk (2/24/2025)    Received from [facility]    Housing Stability    • Are you worried that in the next 2 months you may not have stable housing?: No       Family History   Problem Relation Age of Onset   • Heart disease Father    • No Known Problems Daughter    • No Known Problems Daughter    • No Known Problems Daughter    • No Known Problems Maternal Grandmother    • No Known Problems Maternal Grandfather    • No Known Problems Paternal Grandmother    • No Known Problems Paternal Grandfather    • Uterine cancer Maternal Aunt 75   • Lung cancer Paternal Aunt         age unknown        Patient's Medications   New Prescriptions    No medications on file   Previous Medications    ACETAMINOPHEN (TYLENOL) 500 MG  TABLET    Take 1,000 mg by mouth every 6 (six) hours as needed for mild pain    ALBUTEROL (PROVENTIL HFA,VENTOLIN HFA) 90 MCG/ACT INHALER    Inhale 2 puffs if needed    CHOLECALCIFEROL (VITAMIN D3) 3000 UNITS TABS    Take 3,000 Units by mouth daily    DICLOFENAC SODIUM (VOLTAREN) 1 %    Apply 2 g topically if needed    DICYCLOMINE (BENTYL) 10 MG CAPSULE    TAKE 1 CAPSULE (10 MG TOTAL) BY MOUTH 3 (THREE) TIMES A DAY AS NEEDED (DIARRHEA AND ABDOMINAL PAIN)    DULOXETINE (CYMBALTA) 60 MG DELAYED RELEASE CAPSULE    Take 60 mg by mouth daily    FAMOTIDINE (PEPCID) 10 MG TABLET    Take 10 mg by mouth in the morning    FEXOFENADINE (ALLEGRA) 180 MG TABLET    Take 180 mg by mouth as needed    FLUTICASONE (FLONASE) 50 MCG/ACT NASAL SPRAY    SPRAY 2 SPRAYS INTO EACH NOSTRIL 2 TIMES A DAY    FLUTICASONE-SALMETEROL (ADVAIR, WIXELA) 100-50 MCG/DOSE INHALER    Inhale 1 puff 2 (two) times a day    GABAPENTIN (NEURONTIN) 100 MG CAPSULE    Take 200 mg by mouth daily at bedtime    MELATONIN 3 MG    Take 3 mg by mouth daily at bedtime    MELOXICAM (MOBIC) 15 MG TABLET    Take 15 mg by mouth daily    METHOCARBAMOL (ROBAXIN) 750 MG TABLET    Take 750 mg by mouth if needed    OMEGA-3 1000 MG CAPS    Take 1 capsule by mouth daily    PROBIOTIC PRODUCT (PROBIOTIC BLEND PO)    Take 1 capsule by mouth in the morning    TART CHERRY PO    Take 2 capsules by mouth 2 (two) times a day    TIRZEPATIDE (ZEPBOUND) 7.5 MG/0.5 ML AUTO-INJECTOR    Inject 0.5 mL (7.5 mg total) under the skin once a week   Modified Medications    No medications on file   Discontinued Medications    IPRATROPIUM (ATROVENT) 0.06 % NASAL SPRAY    SPRAY 2 SPRAYS INTO EACH NOSTRIL 3 TIMES A DAY.    LIDOCAINE (LIDODERM) 5 %    APPLY TO AREA OF PAIN FOR UP TO 12 HOURS PER DAY, MAY CUT TO SIZE NEEDED    ONDANSETRON (ZOFRAN) 4 MG TABLET        SODIUM CHLORIDE (OCEAN) 0.65 % NASAL SPRAY    2 sprays into each nostril every 2 (two) hours while awake    TRAMADOL (ULTRAM) 50 MG TABLET    " Take 50 mg by mouth every 6 (six) hours as needed for moderate pain    VALSARTAN (DIOVAN) 160 MG TABLET    Take 160 mg by mouth daily       Allergies   Allergen Reactions   • Amoxicillin-Pot Clavulanate Hives   • Omnipaque [Iohexol] Vomiting   • Penicillins         Ht 5' 6\" (1.676 m)   Wt 88 kg (194 lb)   LMP 03/15/2019 (Exact Date)   BMI 31.31 kg/m²      REVIEW OF SYSTEMS:  Constitutional: Negative.    HEENT: Negative.    Respiratory: Negative.    Skin: Negative.    Neurological: Negative.    Psychiatric/Behavioral: Negative.  Musculoskeletal: Negative except for that mentioned in the HPI.    Gen: No acute distress, resting comfortably in bed  HEENT: Eyes clear, moist mucus membranes, hearing intact  Respiratory: No audible wheezing or stridor  Cardiovascular: Well Perfused peripherally, 2+ distal pulse  Abdomen: nondistended, no peritoneal signs     PHYSICAL EXAM:      Right Foot & Ankle Exam  Alignment:  {PE ALIGNMENT LOWER:56248}  Inspection:  {PE INSPECTION:65411}  Palpation:  {PE PALPATION:86333}  ROM:  {PE ROM LOWER:02433}  Strength:  {PE STRENGTH LOWER:29250}  Stability:  {PE STABILITY ANKLE:84567}  Tests:  {PE TESTS ANKLE:66613}  Neurovascular:  {PE SENSATION:99766} {PE VASCULAR:56980}  Gait:  {PE GAIT:04694}    IMAGING:  I have personally reviewed pertinent films in PACS.  XR of {Right/left:39166} {JOINTS:30286} - ***  XR of {Right/left:43494} {JOINTS:78922} - ***      Electronic Medical Records were reviewed including ***    Thee Xavier    Scribe Attestation      I,:   am acting as a scribe while in the presence of the attending physician.:       I,:   personally performed the services described in this documentation    as scribed in my presence.:               Portions of the record may have been created with voice recognition software.  Occasional wrong word or \"sound a like\" substitutions may have occurred due to the inherent limitations of voice recognition software.  Read the chart carefully " and recognize, using context, where substitutions have occurred. All patient's questions were answered to their satisfaction.

## 2025-04-10 NOTE — PROGRESS NOTES
ORTHO CARE [facility] ORTHOPEDIC [facility]ARE SPECIALISTS [place]  [address]  [facility] [address]  [facility] [address] [phone]       Maria Luz Ponce  [id number]  1965    ORTHOPAEDIC SURGERY OUTPATIENT NOTE  4/10/2025      Assessment & Plan  Other closed fracture of proximal end of right fibula, initial encounter  Continue use of cam boot as well as crutches/scooter  Home exercises reviewed for gentle passive range of motion  Anti-inflammatories or Tylenol prn pain  XR AT NEXT VISIT:  right  tib-fib , 2 views  right ankle, 3+ views  Work note provided at today's visit  Return in about 4 weeks (around 5/8/2025) for Recheck.        Closed avulsion fracture of right ankle, initial encounter  As noted above             The patient's diagnosis and treatment were discussed at length today. We discussed no treatment, non-operative treatment, and operative treatment.    Procedures  No procedures today.    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  She states while at work on 4/2/2025 she went to stand up and had a syncope event.  She was later seen in the emergency department where x-rays were obtained and she was provided with crutches and a cam boot.  She mentions that she has been compliant with both.  She denies any loss of consciousness or head strike.  She mentions that she is seeing her family doctor in regards to her syncope event and they do believe that it is related to her blood pressure.  She denies any numbness or tingling.  She denies any previous history of injury or trauma.  She mentions that most of her pain is predominantly on the lateral aspect of her ankle and does have associated swelling and bruising.  She is currently managing her pain with over-the-counter medication.    Surgical History:  None    Past Medical History:   Diagnosis Date    Ankle fracture     Asthma      Cervical spinal stenosis     Closed left arm fracture, sequela     Concussion     Degeneration, intervertebral disc, cervical     Fibromyalgia, primary     Hypertension     Kidney stone     Migraine     Mixed hyperlipidemia 01/05/2021    Right arm fracture     Seasonal allergies     Shingles     Vitamin D deficiency        Past Surgical History:   Procedure Laterality Date    CARPAL TUNNEL RELEASE Bilateral     CATARACT EXTRACTION Bilateral     FL RETROGRADE PYELOGRAM  09/13/2024    FL RETROGRADE PYELOGRAM  10/25/2024    MOUTH SURGERY      NE COLONOSCOPY FLX DX W/COLLJ SPEC WHEN PFRMD N/A 12/27/2018    Procedure: COLONOSCOPY;  Surgeon: Bita Montalvo MD;  [facility];  Service: Gastroenterology    NE CYSTO/URETERO W/LITHOTRIPSY &INDWELL STENT INSRT Left 09/13/2024    Procedure: CYSTOSCOPY URETEROSCOPY WITH RETROGRADE PYELOGRAM AND INSERTION STENT URETERAL;  Surgeon: Eder Johns MD;  [facility];  Service: Urology    NE CYSTO/URETERO W/LITHOTRIPSY &INDWELL STENT INSRT Left 10/25/2024    Procedure: CYSTO USCOPE, RETROGRADE PYLEOGRAM WITH BASKET EXTRACTION OF STONE;  Surgeon: Eder Johns MD;  [facility];  Service: Urology    NE LAPAROSCOPY W TOTAL HYSTERECTOMY UTERUS 250 GM/< N/A 11/13/2024    Procedure: TOTAL LAPAROSCOPIC HYSTERECTOMY., BILATERAL SALPINGOOPHORECTOMY, CYSTOSCOPY WITH INSERTION URETERAL CATHETER;  Surgeon: Nahun Ricardo MD;  [facility];  Service: Gynecology Oncology    SINUS SURGERY      TUBAL LIGATION         Social History     Socioeconomic History    Marital status: /Civil Union     Spouse name: Not on file    Number of children: Not on file    Years of education: Not on file    Highest education level: Not on file   Occupational History    Not on file   Tobacco Use    Smoking status: Never    Smokeless tobacco: Never   Vaping Use    Vaping status: Never Used   Substance and Sexual Activity    Alcohol use: Not Currently      Comment: social    Drug use: No    Sexual activity: Yes     Partners: Male     Birth control/protection: Female Sterilization   Other Topics Concern    Not on file   Social History Narrative    Not on file     Social Drivers of Health     Financial Resource Strain: Not At Risk (2/24/2025)    Received from [facility]    Financial Insecurity     In the last 12 months did you skip medications to save money?: No     In the last 12 months was there a time when you needed to see a doctor but could not because of cost?: No   Food Insecurity: No Food Insecurity (2/24/2025)    Received from [facility]    Food Insecurity     In the last 12 months did you ever eat less than you felt you should because there wasn't enough money for food?: No   Transportation Needs: No Transportation Needs (2/24/2025)    Received from [facility]    Transportation Needs     In the last 12 months have you ever had to go without healthcare because you didn't have a way to get there?: No   Physical Activity: Not on file   Stress: No Stress Concern Present (2/20/2024)    Received from [facility], [facility]    [employer] - Occupational Stress Questionnaire     Feeling of Stress : Only a little   Social Connections: Socially Integrated (2/24/2025)    Received from [facility]    Social Connection     Do you often feel lonely?: No   Intimate Partner Violence: Unknown (10/23/2024)    Nursing IPS     Feels Physically and Emotionally Safe: Not on file     Physically Hurt by Someone: Not on file     Humiliated or Emotionally Abused by Someone: Not on file     Physically Hurt by Someone: No     Hurt or Threatened by Someone: No   Housing Stability: Not At Risk (2/24/2025)    Received from [facility]    Housing Stability     Are you worried that in the next 2 months you may not have stable housing?: No        Family History   Problem Relation Age of Onset    Heart disease Father     No Known Problems Daughter     No Known Problems Daughter     No Known Problems Daughter     No Known Problems Maternal Grandmother     No Known Problems Maternal Grandfather     No Known Problems Paternal Grandmother     No Known Problems Paternal Grandfather     Uterine cancer Maternal Aunt 75    Lung cancer Paternal Aunt         age unknown        Patient's Medications   New Prescriptions    No medications on file   Previous Medications    ACETAMINOPHEN (TYLENOL) 500 MG TABLET    Take 1,000 mg by mouth every 6 (six) hours as needed for mild pain    ALBUTEROL (PROVENTIL HFA,VENTOLIN HFA) 90 MCG/ACT INHALER    Inhale 2 puffs if needed    CHOLECALCIFEROL (VITAMIN D3) 3000 UNITS TABS    Take 3,000 Units by mouth daily    DICLOFENAC SODIUM (VOLTAREN) 1 %    Apply 2 g topically if needed    DICYCLOMINE (BENTYL) 10 MG CAPSULE    TAKE 1 CAPSULE (10 MG TOTAL) BY MOUTH 3 (THREE) TIMES A DAY AS NEEDED (DIARRHEA AND ABDOMINAL PAIN)    DULOXETINE (CYMBALTA) 60 MG DELAYED RELEASE CAPSULE    Take 60 mg by mouth daily    FAMOTIDINE (PEPCID) 10 MG TABLET    Take 10 mg by mouth in the morning    FEXOFENADINE (ALLEGRA) 180 MG TABLET    Take 180 mg by mouth as needed    FLUTICASONE (FLONASE) 50 MCG/ACT NASAL SPRAY    SPRAY 2 SPRAYS INTO EACH NOSTRIL 2 TIMES A DAY    FLUTICASONE-SALMETEROL (ADVAIR, WIXELA) 100-50 MCG/DOSE INHALER    Inhale 1 puff 2 (two) times a day    GABAPENTIN (NEURONTIN) 100 MG CAPSULE    Take 200 mg by mouth daily at bedtime    MELATONIN 3 MG    Take 3 mg by mouth daily at bedtime    MELOXICAM (MOBIC) 15 MG TABLET    Take 15 mg by mouth daily    METHOCARBAMOL (ROBAXIN) 750 MG TABLET    Take 750 mg by mouth if needed    OMEGA-3 1000 MG CAPS    Take 1 capsule by mouth daily    PROBIOTIC PRODUCT (PROBIOTIC BLEND PO)    Take 1 capsule by mouth in the morning    TART CHERRY PO    Take 2 capsules by mouth 2 (two) times a day     "TIRZEPATIDE (ZEPBOUND) 7.5 MG/0.5 ML AUTO-INJECTOR    Inject 0.5 mL (7.5 mg total) under the skin once a week   Modified Medications    No medications on file   Discontinued Medications    IPRATROPIUM (ATROVENT) 0.06 % NASAL SPRAY    SPRAY 2 SPRAYS INTO EACH NOSTRIL 3 TIMES A DAY.    LIDOCAINE (LIDODERM) 5 %    APPLY TO AREA OF PAIN FOR UP TO 12 HOURS PER DAY, MAY CUT TO SIZE NEEDED    ONDANSETRON (ZOFRAN) 4 MG TABLET        SODIUM CHLORIDE (OCEAN) 0.65 % NASAL SPRAY    2 sprays into each nostril every 2 (two) hours while awake    TRAMADOL (ULTRAM) 50 MG TABLET    Take 50 mg by mouth every 6 (six) hours as needed for moderate pain    VALSARTAN (DIOVAN) 160 MG TABLET    Take 160 mg by mouth daily       Allergies   Allergen Reactions    Amoxicillin-Pot Clavulanate Hives    Omnipaque [Iohexol] Vomiting    Penicillins         Ht 5' 6\" (1.676 m)   Wt 88 kg (194 lb)   LMP 03/15/2019 (Exact Date)   BMI 31.31 kg/m²      REVIEW OF SYSTEMS:  Constitutional: Negative.    HEENT: Negative.    Respiratory: Negative.    Skin: Negative.    Neurological: Negative.    Psychiatric/Behavioral: Negative.  Musculoskeletal: Negative except for that mentioned in the HPI.    Gen: No acute distress, resting comfortably in bed  HEENT: Eyes clear, moist mucus membranes, hearing intact  Respiratory: No audible wheezing or stridor  Cardiovascular: Well Perfused peripherally, 2+ distal pulse  Abdomen: nondistended, no peritoneal signs     PHYSICAL EXAM:      Right Foot & Ankle Exam  Alignment:  Normal ankle alignment.  Inspection:   Lateral ankle swelling. No edema.  Diffuse ecchymosis. No deformity.  Palpation:   Lateral malleoli and proximal fibula tenderness. No effusion. No warmth. No crepitus.  ROM:  Not tested.  Strength:  Not tested.  Stability:  (-) Anterior  neutral. (-) Anterior  PF.  Tests:   Negative squeeze  Neurovascular:  Sensation intact in DP/SP/Villarreal/Sa/T nerve distributions. 2+ DP & PT pulses.  Gait:   Steady " "in cam boot and crutches    IMAGING:  I have personally reviewed pertinent films in PACS.  XR of right tib/fib -performed on 4/2/2025 demonstrates stable nondisplaced oblique fibula fracture  XR of right ankle -4/2/2025 demonstrates small bony fragment adjacent to the medial lateral malleolar tips  No syndesmotic widening      Electronic Medical Records were reviewed including emergency visit note from 4/2/2025    Thee Xavier    Scribe Attestation      I,:  Thee Xavier am acting as a scribe while in the presence of the attending physician.:       I,:  Nico Swan, DO personally performed the services described in this documentation    as scribed in my presence.:               Portions of the record may have been created with voice recognition software.  Occasional wrong word or \"sound a like\" substitutions may have occurred due to the inherent limitations of voice recognition software.  Read the chart carefully and recognize, using context, where substitutions have occurred. All patient's questions were answered to their satisfaction.   "

## 2025-04-15 LAB
APOB+LDLR+PCSK9 GENE MUT ANL BLD/T: NOT DETECTED
BRCA1+BRCA2 DEL+DUP + FULL MUT ANL BLD/T: NOT DETECTED
MLH1+MSH2+MSH6+PMS2 GN DEL+DUP+FUL M: NOT DETECTED

## 2025-04-21 DIAGNOSIS — Z12.31 ENCOUNTER FOR SCREENING MAMMOGRAM FOR MALIGNANT NEOPLASM OF BREAST: Primary | ICD-10-CM

## 2025-05-09 ENCOUNTER — APPOINTMENT (OUTPATIENT)
Dept: RADIOLOGY | Facility: AMBULARY SURGERY CENTER | Age: 60
End: 2025-05-09
Attending: STUDENT IN AN ORGANIZED HEALTH CARE EDUCATION/TRAINING PROGRAM
Payer: COMMERCIAL

## 2025-05-09 ENCOUNTER — OFFICE VISIT (OUTPATIENT)
Dept: OBGYN CLINIC | Facility: CLINIC | Age: 60
End: 2025-05-09
Payer: OTHER MISCELLANEOUS

## 2025-05-09 VITALS — HEIGHT: 66 IN | BODY MASS INDEX: 31.18 KG/M2 | WEIGHT: 194 LBS

## 2025-05-09 DIAGNOSIS — S82.831A OTHER CLOSED FRACTURE OF PROXIMAL END OF RIGHT FIBULA, INITIAL ENCOUNTER: ICD-10-CM

## 2025-05-09 DIAGNOSIS — S82.891A CLOSED AVULSION FRACTURE OF RIGHT ANKLE, INITIAL ENCOUNTER: ICD-10-CM

## 2025-05-09 DIAGNOSIS — S82.831A OTHER CLOSED FRACTURE OF PROXIMAL END OF RIGHT FIBULA, INITIAL ENCOUNTER: Primary | ICD-10-CM

## 2025-05-09 PROCEDURE — 73590 X-RAY EXAM OF LOWER LEG: CPT

## 2025-05-09 PROCEDURE — 99213 OFFICE O/P EST LOW 20 MIN: CPT | Performed by: STUDENT IN AN ORGANIZED HEALTH CARE EDUCATION/TRAINING PROGRAM

## 2025-05-09 RX ORDER — SULFAMETHOXAZOLE AND TRIMETHOPRIM 800; 160 MG/1; MG/1
1 TABLET ORAL 2 TIMES DAILY
COMMUNITY
End: 2025-05-09

## 2025-05-09 RX ORDER — METRONIDAZOLE 500 MG/1
TABLET ORAL
COMMUNITY
End: 2025-05-09

## 2025-05-09 RX ORDER — VALSARTAN 80 MG/1
1 TABLET ORAL DAILY
COMMUNITY

## 2025-05-09 NOTE — PROGRESS NOTES
"ORTHO CARE [facility] ORTHOPEDIC CARE SPECIALISTS [place]  [address] [facility] [address]  [address]  [place] [address] [phone]       Maria Luz Ponce  [id number]  1965    ORTHOPAEDIC SURGERY OUTPATIENT NOTE  5/9/2025      Assessment & Plan  Other closed fracture of proximal end of right fibula, initial encounter      Closed avulsion fracture of right ankle, initial encounter    Repeat X-ray obtained at today's visit and was reviewed with patient in depth  Discussed that can slowly discontinue use of scooter and cam boot  Patient requested DEXA scan which was ordered today.  I did advise her to follow-up on the results with her PCP.  Discussed that calf tenderness is likely from cam boot and pressure with her scooter, discussed not concerned for DVT as she has no asymmetric swelling and negative Homans' sign  OTC pain medication as needed  Follow up in 4 weeks, will obtain repeat x-rays   A updated work note was provided at today's visit      Procedures  No procedures today.    Translation: No    HISTORY:  59 y.o. female  who is present in office for follow up right ankle fracture and proximal fibular fracture. She notes that she continues to have proximal fibular fracture site tenderness but notes that her ankle is doing much better, she has been compliant with use of her cam boot and has been using a scooter to get around. She denies any new injuries and denies any numbness or tingling. She does note that she does occasionally have some calf tenderness.          The following portions of the patient's history were reviewed and updated as appropriate: allergies, current medications, past family history, past social history, past surgical history and problem list.    Ht 5' 6\" (1.676 m)   Wt 88 kg (194 lb)   LMP 03/15/2019 (Exact Date)   BMI 31.31 kg/m²    Lab Results   Component Value Date    HGBA1C 5.4 10/31/2024         Past Medical History:   Diagnosis Date    Ankle fracture     Asthma     " Cervical spinal stenosis     Closed left arm fracture, sequela     Concussion     Degeneration, intervertebral disc, cervical     Fibromyalgia, primary     Hypertension     Kidney stone     Migraine     Mixed hyperlipidemia 01/05/2021    Right arm fracture     Seasonal allergies     Shingles     Vitamin D deficiency        Past Surgical History:   Procedure Laterality Date    CARPAL TUNNEL RELEASE Bilateral     CATARACT EXTRACTION Bilateral     FL RETROGRADE PYELOGRAM  09/13/2024    FL RETROGRADE PYELOGRAM  10/25/2024    MOUTH SURGERY      TN COLONOSCOPY FLX DX W/COLLJ SPEC WHEN PFRMD N/A 12/27/2018    Procedure: COLONOSCOPY;  Surgeon: Bita Montalvo MD;  [facility];  Service: Gastroenterology    TN CYSTO/URETERO W/LITHOTRIPSY &INDWELL STENT INSRT Left 09/13/2024    Procedure: CYSTOSCOPY URETEROSCOPY WITH RETROGRADE PYELOGRAM AND INSERTION STENT URETERAL;  Surgeon: Eder Johns MD;  [facility];  Service: Urology    TN CYSTO/URETERO W/LITHOTRIPSY &INDWELL STENT INSRT Left 10/25/2024    Procedure: CYSTO USCOPE, RETROGRADE PYLEOGRAM WITH BASKET EXTRACTION OF STONE;  Surgeon: Eder Johns MD;  [facility];  Service: Urology    TN LAPAROSCOPY W TOTAL HYSTERECTOMY UTERUS 250 GM/< N/A 11/13/2024    Procedure: TOTAL LAPAROSCOPIC HYSTERECTOMY., BILATERAL SALPINGOOPHORECTOMY, CYSTOSCOPY WITH INSERTION URETERAL CATHETER;  Surgeon: Nahun Ricardo MD;  [facility];  Service: Gynecology Oncology    SINUS SURGERY      TUBAL LIGATION         Social History     Socioeconomic History    Marital status: /Civil Union     Spouse name: Not on file    Number of children: Not on file    Years of education: Not on file    Highest education level: Not on file   Occupational History    Not on file   Tobacco Use    Smoking status: Never    Smokeless tobacco: Never   Vaping Use    Vaping status: Never Used   Substance and Sexual Activity    Alcohol use: Not Currently      Comment: social    Drug use: No    Sexual activity: Yes     Partners: Male     Birth control/protection: Female Sterilization   Other Topics Concern    Not on file   Social History Narrative    Not on file     Social Drivers of Health     Financial Resource Strain: Not At Risk (2/24/2025)    Received from [facility]    Financial Insecurity     In the last 12 months did you skip medications to save money?: No     In the last 12 months was there a time when you needed to see a doctor but could not because of cost?: No   Food Insecurity: No Food Insecurity (2/24/2025)    Received from [facility]    Food Insecurity     In the last 12 months did you ever eat less than you felt you should because there wasn't enough money for food?: No   Transportation Needs: No Transportation Needs (2/24/2025)    Received from [facility]    Transportation Needs     In the last 12 months have you ever had to go without healthcare because you didn't have a way to get there?: No   Physical Activity: Not on file   Stress: No Stress Concern Present (2/20/2024)    [facility]    [employer] - Occupational Stress Questionnaire     Feeling of Stress : Only a little   Social Connections: Socially Integrated (2/24/2025)    Received from [facility]    Social Connection     Do you often feel lonely?: No   Intimate Partner Violence: Unknown (10/23/2024)    Nursing IPS     Feels Physically and Emotionally Safe: Not on file     Physically Hurt by Someone: Not on file     Humiliated or Emotionally Abused by Someone: Not on file     Physically Hurt by Someone: No     Hurt or Threatened by Someone: No   Housing Stability: Not At Risk (2/24/2025)    Received from [facility]    Housing Stability     Are you worried that in the next 2 months you may not have stable housing?: No        Family History   Problem Relation Age of Onset    Heart disease Father     No Known Problems Daughter     No Known Problems Daughter     No Known Problems Daughter     No Known Problems Maternal Grandmother     No Known Problems Maternal Grandfather     No Known Problems Paternal Grandmother     No Known Problems Paternal Grandfather     Uterine cancer Maternal Aunt 75    Lung cancer Paternal Aunt         age unknown        Patient's Medications   New Prescriptions    No medications on file   Previous Medications    ACETAMINOPHEN (TYLENOL) 500 MG TABLET    Take 1,000 mg by mouth every 6 (six) hours as needed for mild pain    ALBUTEROL (PROVENTIL HFA,VENTOLIN HFA) 90 MCG/ACT INHALER    Inhale 2 puffs if needed    CHOLECALCIFEROL (VITAMIN D3) 3000 UNITS TABS    Take 3,000 Units by mouth daily    DICLOFENAC SODIUM (VOLTAREN) 1 %    Apply 2 g topically if needed    DICYCLOMINE (BENTYL) 10 MG CAPSULE    TAKE 1 CAPSULE (10 MG TOTAL) BY MOUTH 3 (THREE) TIMES A DAY AS NEEDED (DIARRHEA AND ABDOMINAL PAIN)    DULOXETINE (CYMBALTA) 60 MG DELAYED RELEASE CAPSULE    Take 60 mg by mouth daily    FAMOTIDINE (PEPCID) 10 MG TABLET    Take 10 mg by mouth in the morning    FEXOFENADINE (ALLEGRA) 180 MG TABLET    Take 180 mg by mouth as needed    FLUTICASONE (FLONASE) 50 MCG/ACT NASAL SPRAY    SPRAY 2 SPRAYS INTO EACH NOSTRIL 2 TIMES A DAY    FLUTICASONE-SALMETEROL (ADVAIR, WIXELA) 100-50 MCG/DOSE INHALER    Inhale 1 puff 2 (two) times a day    GABAPENTIN (NEURONTIN) 100 MG CAPSULE    Take 200 mg by mouth daily at bedtime    MELATONIN 3 MG    Take 3 mg by mouth daily at bedtime    MELOXICAM (MOBIC) 15 MG TABLET    Take 15 mg by mouth daily    METHOCARBAMOL (ROBAXIN) 750 MG TABLET    Take 750 mg by mouth if needed    OMEGA-3 1000 MG CAPS    Take 1 capsule by mouth daily    TIRZEPATIDE (ZEPBOUND) 7.5 MG/0.5 ML AUTO-INJECTOR    Inject 0.5 mL (7.5 mg total) under the skin once a week    VALSARTAN (DIOVAN) 80 MG TABLET    Take 1  tablet by mouth daily   Modified Medications    No medications on file   Discontinued Medications    METRONIDAZOLE (FLAGYL) 500 MG TABLET        PROBIOTIC PRODUCT (PROBIOTIC BLEND PO)    Take 1 capsule by mouth in the morning    SULFAMETHOXAZOLE-TRIMETHOPRIM (BACTRIM DS) 800-160 MG PER TABLET    Take 1 tablet by mouth 2 (two) times a day    TART LANGE PO    Take 2 capsules by mouth 2 (two) times a day       Allergies   Allergen Reactions    Amoxicillin-Pot Clavulanate Hives    Omnipaque [Iohexol] Vomiting    Penicillins           REVIEW OF SYSTEMS:  Constitutional: Negative.    HEENT: Negative.    Respiratory: Negative.    Skin: Negative.    Neurological: Negative.    Psychiatric/Behavioral: Negative.  Musculoskeletal: Negative except for that mentioned in the HPI.    Gen: No acute distress, resting comfortably in bed  HEENT: Eyes clear, moist mucus membranes, hearing intact  Respiratory: No audible wheezing or stridor  Cardiovascular: Well Perfused peripherally, 2+ distal pulse  Abdomen: nondistended, no peritoneal signs     PHYSICAL EXAM:      Right Foot & Ankle Exam  Alignment:  Normal ankle alignment.  Inspection:   resolved ankle swelling No edema.  No ecchymosis. No deformity.  Palpation:   Mild Lateral malleoli and moderate proximal fibula tenderness. No effusion. No warmth. No crepitus. No calf tenderness to palpation  ROM:  Not tested.  Strength:  Not tested.  Stability:  (-) Anterior  neutral. (-) Anterior  PF.  Tests:   Negative squeeze  Neurovascular:  Sensation intact in DP/SP/Villarreal/Sa/T nerve distributions. 2+ DP & PT pulses.  Gait:   Steady in cam boot and crutches    IMAGING:  XR of right tib/fib: shows callus formation of the proximal fibula fracture, fracture is in proper alignment with no further displacement, will review radiologists interpretation once available          America Montgomery PA-C    Scribe Attestation      I,:  America Montgomery PA-C am acting as a scribe while in the  "presence of the attending physician.:       I,:  Nico Swan, DO personally performed the services described in this documentation    as scribed in my presence.:               Portions of the record may have been created with voice recognition software.  Occasional wrong word or \"sound a like\" substitutions may have occurred due to the inherent limitations of voice recognition software.  Read the chart carefully and recognize, using context, where substitutions have occurred. All patient's questions were answered to their satisfaction.    "

## 2025-05-09 NOTE — LETTER
May 9, 2025     Patient: Maria Luz Ponce  YOB: 1965  Date of Visit: 5/9/2025      To Whom it May Concern:    Maria Luz Ponce is under my professional care. Maria Luz was seen in my office on 5/9/2025. Maria Luz may continue to work with use of CAM boot and scooter/crutches as needed. Her work status will be re-evaluated at her next follow up in 4 weeks .   If you have any questions or concerns, please don't hesitate to call.         Sincerely,          Nico Swan, DO

## 2025-05-27 DIAGNOSIS — K76.0 HEPATIC STEATOSIS: ICD-10-CM

## 2025-05-27 DIAGNOSIS — I10 PRIMARY HYPERTENSION: ICD-10-CM

## 2025-05-27 DIAGNOSIS — E66.811 OBESITY (BMI 30.0-34.9): Primary | ICD-10-CM

## 2025-05-27 RX ORDER — TIRZEPATIDE 10 MG/.5ML
10 INJECTION, SOLUTION SUBCUTANEOUS WEEKLY
Qty: 6 ML | Refills: 0 | Status: SHIPPED | OUTPATIENT
Start: 2025-05-27 | End: 2025-08-19

## 2025-06-10 ENCOUNTER — HOSPITAL ENCOUNTER (OUTPATIENT)
Dept: MAMMOGRAPHY | Facility: MEDICAL CENTER | Age: 60
Discharge: HOME | End: 2025-06-10
Payer: COMMERCIAL

## 2025-06-10 VITALS — BODY MASS INDEX: 31.18 KG/M2 | WEIGHT: 194 LBS | HEIGHT: 66 IN

## 2025-06-10 DIAGNOSIS — Z12.31 ENCOUNTER FOR SCREENING MAMMOGRAM FOR MALIGNANT NEOPLASM OF BREAST: ICD-10-CM

## 2025-06-10 PROCEDURE — 77063 BREAST TOMOSYNTHESIS BI: CPT

## 2025-06-10 PROCEDURE — 77067 SCR MAMMO BI INCL CAD: CPT

## 2025-06-11 ENCOUNTER — TELEPHONE (OUTPATIENT)
Dept: BARIATRICS | Facility: CLINIC | Age: 60
End: 2025-06-11

## 2025-06-11 ENCOUNTER — APPOINTMENT (OUTPATIENT)
Dept: RADIOLOGY | Facility: AMBULARY SURGERY CENTER | Age: 60
End: 2025-06-11
Attending: STUDENT IN AN ORGANIZED HEALTH CARE EDUCATION/TRAINING PROGRAM
Payer: COMMERCIAL

## 2025-06-11 ENCOUNTER — OFFICE VISIT (OUTPATIENT)
Dept: OBGYN CLINIC | Facility: CLINIC | Age: 60
End: 2025-06-11
Payer: OTHER MISCELLANEOUS

## 2025-06-11 VITALS — WEIGHT: 194 LBS | BODY MASS INDEX: 31.18 KG/M2 | HEIGHT: 66 IN

## 2025-06-11 DIAGNOSIS — K76.0 HEPATIC STEATOSIS: ICD-10-CM

## 2025-06-11 DIAGNOSIS — S82.831A OTHER CLOSED FRACTURE OF PROXIMAL END OF RIGHT FIBULA, INITIAL ENCOUNTER: ICD-10-CM

## 2025-06-11 DIAGNOSIS — I10 PRIMARY HYPERTENSION: ICD-10-CM

## 2025-06-11 DIAGNOSIS — S82.891A CLOSED AVULSION FRACTURE OF RIGHT ANKLE, INITIAL ENCOUNTER: ICD-10-CM

## 2025-06-11 DIAGNOSIS — E66.811 OBESITY (BMI 30.0-34.9): ICD-10-CM

## 2025-06-11 DIAGNOSIS — S82.831A OTHER CLOSED FRACTURE OF PROXIMAL END OF RIGHT FIBULA, INITIAL ENCOUNTER: Primary | ICD-10-CM

## 2025-06-11 PROCEDURE — 99214 OFFICE O/P EST MOD 30 MIN: CPT | Performed by: STUDENT IN AN ORGANIZED HEALTH CARE EDUCATION/TRAINING PROGRAM

## 2025-06-11 PROCEDURE — 73590 X-RAY EXAM OF LOWER LEG: CPT

## 2025-06-11 RX ORDER — TIRZEPATIDE 10 MG/.5ML
10 INJECTION, SOLUTION SUBCUTANEOUS WEEKLY
Qty: 2 ML | Refills: 0 | Status: SHIPPED | OUTPATIENT
Start: 2025-06-11 | End: 2025-09-03

## 2025-06-11 NOTE — LETTER
June 11, 2025     Patient: Maria Luz Ponce  YOB: 1965  Date of Visit: 6/11/2025      To Whom it May Concern:    Maria Luz Ponce is under my professional care. Maria Luz was seen in my office on 6/11/2025. Maria Luz should discontinue cam boot, she should avoid stretchers and lifting greater than 25 pounds.    If you have any questions or concerns, please don't hesitate to call.         Sincerely,          Nico Swan, DO

## 2025-06-11 NOTE — PROGRESS NOTES
"[facility]TH[facility] [place]  [address]  [address]  [facility] [address] [phone]       Maria Luz Ponce  [id number]  1965    ORTHOPAEDIC SURGERY OUTPATIENT NOTE  6/11/2025      Assessment & Plan  Other closed fracture of proximal end of right fibula, initial encounter    Orders:    XR tibia fibula 2 vw right; Future    Closed avulsion fracture of right ankle, initial encounter    X-ray of the tib/fib obtained in office and reviewed with patient  Discussed that she continues to make callus and has bridging bone  Discussed that due to continued discomfort recommended physical therapy for the ankle, referral placed  We discussed range of motion exercises that she can do on her own at home   OTC pain medication as needed  Follow up in 6 weeks and will obtain repeat tib/fib x-rays       Procedures  No procedures today.    Translation: No    HISTORY:  59 y.o. female  who is present in office for follow up right ankle fracture and proximal fibular fracture. She notes that she continues to have some improved dull achy discomfort of the fibula and ankle, she notes that she returned to drving on the high way which causes some discomfort. She notes that she has been doing well at work and does have discomfort at the end of the day. She denies any new injuries and denies any numbness or tingling.         The following portions of the patient's history were reviewed and updated as appropriate: allergies, current medications, past family history, past social history, past surgical history and problem list.    Ht 5' 6\" (1.676 m)   Wt 88 kg (194 lb)   LMP 03/15/2019 (Exact Date)   BMI 31.31 kg/m²    Lab Results   Component Value Date    HGBA1C 5.4 10/31/2024         Past Medical History:   Diagnosis Date    Ankle fracture     Asthma     Cervical spinal stenosis     Closed left arm fracture, sequela     Concussion     Degeneration, intervertebral disc, cervical     " Fibromyalgia, primary     Hypertension     Kidney stone     Migraine     Mixed hyperlipidemia 01/05/2021    Right arm fracture     Seasonal allergies     Shingles     Vitamin D deficiency        Past Surgical History:   Procedure Laterality Date    CARPAL TUNNEL RELEASE Bilateral     CATARACT EXTRACTION Bilateral     FL RETROGRADE PYELOGRAM  09/13/2024    FL RETROGRADE PYELOGRAM  10/25/2024    MOUTH SURGERY      MN COLONOSCOPY FLX DX W/COLLJ SPEC WHEN PFRMD N/A 12/27/2018    Procedure: COLONOSCOPY;  Surgeon: Bita Montalvo MD;  [facility];  Service: Gastroenterology    MN CYSTO/URETERO W/LITHOTRIPSY &INDWELL STENT INSRT Left 09/13/2024    Procedure: CYSTOSCOPY URETEROSCOPY WITH RETROGRADE PYELOGRAM AND INSERTION STENT URETERAL;  Surgeon: Eder Johns MD;  Location[facility];  Service: Urology    MN CYSTO/URETERO W/LITHOTRIPSY &INDWELL STENT INSRT Left 10/25/2024    Procedure: CYSTO USCOPE, RETROGRADE PYLEOGRAM WITH BASKET EXTRACTION OF STONE;  Surgeon: Eder Johns MD;  [facility];  Service: Urology    MN LAPAROSCOPY W TOTAL HYSTERECTOMY UTERUS 250 GM/< N/A 11/13/2024    Procedure: TOTAL LAPAROSCOPIC HYSTERECTOMY., BILATERAL SALPINGOOPHORECTOMY, CYSTOSCOPY WITH INSERTION URETERAL CATHETER;  Surgeon: Nahun Ricardo MD;  Location[facility];  Service: Gynecology Oncology    SINUS SURGERY      TUBAL LIGATION         Social History     Socioeconomic History    Marital status: /Civil Union     Spouse name: Not on file    Number of children: Not on file    Years of education: Not on file    Highest education level: Not on file   Occupational History    Not on file   Tobacco Use    Smoking status: Never    Smokeless tobacco: Never   Vaping Use    Vaping status: Never Used   Substance and Sexual Activity    Alcohol use: Not Currently     Comment: social    Drug use: No    Sexual activity: Yes     Partners: Male     Birth control/protection: Female Sterilization   Other  Topics Concern    Not on file   Social History Narrative    Not on file     Social Drivers of Health     Financial Resource Strain: Not At Risk (2/24/2025)    Received from [facility]    Financial Insecurity     In the last 12 months did you skip medications to save money?: No     In the last 12 months was there a time when you needed to see a doctor but could not because of cost?: No   Food Insecurity: No Food Insecurity (2/24/2025)    Received from [facility]    Food Insecurity     In the last 12 months did you ever eat less than you felt you should because there wasn't enough money for food?: No   Transportation Needs: No Transportation Needs (2/24/2025)    Received from [facility]    Transportation Needs     In the last 12 months have you ever had to go without healthcare because you didn't have a way to get there?: No   Physical Activity: Not on file   Stress: No Stress Concern Present (2/20/2024)    Received from [facility]    [employer] - Occupational Stress Questionnaire     Feeling of Stress : Only a little   Social Connections: Socially Integrated (2/24/2025)    Received from [facility]    Social Connection     Do you often feel lonely?: No   Intimate Partner Violence: Unknown (10/23/2024)    Nursing IPS     Feels Physically and Emotionally Safe: Not on file     Physically Hurt by Someone: Not on file     Humiliated or Emotionally Abused by Someone: Not on file     Physically Hurt by Someone: No     Hurt or Threatened by Someone: No   Housing Stability: Not At Risk (2/24/2025)    Received from [facility]    Housing Stability     Are you worried that in the next 2 months you may not have stable housing?: No       Family History   Problem Relation Name Age of Onset    Heart disease Father      No Known Problems Daughter nakia     No Known Problems Daughter deidre      No Known Problems Daughter mook     No Known Problems Maternal Grandmother      No Known Problems Maternal Grandfather      No Known Problems Paternal Grandmother      No Known Problems Paternal Grandfather      Uterine cancer Maternal Aunt shalini 75    Lung cancer Paternal Aunt          age unknown    Breast cancer Neg Hx          Patient's Medications   New Prescriptions    No medications on file   Previous Medications    ACETAMINOPHEN (TYLENOL) 500 MG TABLET    Take 1,000 mg by mouth every 6 (six) hours as needed for mild pain    ALBUTEROL (PROVENTIL HFA,VENTOLIN HFA) 90 MCG/ACT INHALER    Inhale 2 puffs if needed    CHOLECALCIFEROL (VITAMIN D3) 3000 UNITS TABS    Take 3,000 Units by mouth in the morning.    DICLOFENAC SODIUM (VOLTAREN) 1 %    Apply 2 g topically if needed    DICYCLOMINE (BENTYL) 10 MG CAPSULE    TAKE 1 CAPSULE (10 MG TOTAL) BY MOUTH 3 (THREE) TIMES A DAY AS NEEDED (DIARRHEA AND ABDOMINAL PAIN)    DULOXETINE (CYMBALTA) 60 MG DELAYED RELEASE CAPSULE    Take 60 mg by mouth in the morning.    FAMOTIDINE (PEPCID) 10 MG TABLET    Take 10 mg by mouth in the morning    FEXOFENADINE (ALLEGRA) 180 MG TABLET    Take 180 mg by mouth as needed    FLUTICASONE (FLONASE) 50 MCG/ACT NASAL SPRAY    SPRAY 2 SPRAYS INTO EACH NOSTRIL 2 TIMES A DAY    FLUTICASONE-SALMETEROL (ADVAIR, WIXELA) 100-50 MCG/DOSE INHALER    Inhale 1 puff in the morning and 1 puff in the evening.    GABAPENTIN (NEURONTIN) 100 MG CAPSULE    Take 200 mg by mouth daily at bedtime    MELATONIN 3 MG    Take 3 mg by mouth daily at bedtime    MELOXICAM (MOBIC) 15 MG TABLET    Take 15 mg by mouth in the morning.    METHOCARBAMOL (ROBAXIN) 750 MG TABLET    Take 750 mg by mouth if needed    OMEGA-3 1000 MG CAPS    Take 1 capsule by mouth in the morning.    TIRZEPATIDE (ZEPBOUND) 10 MG/0.5 ML AUTO-INJECTOR    Inject 0.5 mL (10 mg total) under the skin once a week    VALSARTAN (DIOVAN) 80 MG TABLET    Take 1 tablet by mouth daily   Modified  "Medications    No medications on file   Discontinued Medications    No medications on file       Allergies   Allergen Reactions    Amoxicillin-Pot Clavulanate Hives    Omnipaque [Iohexol] Vomiting    Penicillins           REVIEW OF SYSTEMS:  Constitutional: Negative.    HEENT: Negative.    Respiratory: Negative.    Skin: Negative.    Neurological: Negative.    Psychiatric/Behavioral: Negative.  Musculoskeletal: Negative except for that mentioned in the HPI.    Gen: No acute distress, resting comfortably in bed  HEENT: Eyes clear, moist mucus membranes, hearing intact  Respiratory: No audible wheezing or stridor  Cardiovascular: Well Perfused peripherally, 2+ distal pulse  Abdomen: nondistended, no peritoneal signs     PHYSICAL EXAM:      Right Foot & Ankle Exam  Alignment:  Normal ankle alignment.  Inspection:  No ankle swelling No edema.  No ecchymosis. No deformity.  Palpation:   TTP shaft of fibula fracture site,No ankle tenderness No effusion. No warmth. No crepitus. No calf tenderness to palpation  ROM:  Full ankle ROM  Strength:  Not tested.  Stability:  (-) Anterior  neutral. (-) Anterior  PF.  Tests:   Negative squeeze  Neurovascular:  Sensation intact in DP/SP/Villarreal/Sa/T nerve distributions. 2+ DP & PT pulses.  Gait:   normal    IMAGING:  XR of right tib/fib: shows continued callus formation and bridging bone of  proximal fibula fracture, fracture is in proper alignment with no further displacement, will review radiologists interpretation once available          America Montgomery PA-C    Scribe Attestation      I,:  America Montgomery PA-C am acting as a scribe while in the presence of the attending physician.:       I,:  Nico Swan DO personally performed the services described in this documentation    as scribed in my presence.:               Portions of the record may have been created with voice recognition software.  Occasional wrong word or \"sound a like\" substitutions may have occurred " due to the inherent limitations of voice recognition software.  Read the chart carefully and recognize, using context, where substitutions have occurred. All patient's questions were answered to their satisfaction.

## 2025-06-11 NOTE — TELEPHONE ENCOUNTER
PA for Zepbound 10mg APPROVED     Date(s) approved 5/12/2025 - 6/11/2026    Case #    Patient advised by          [x]Tuniihart Message  []Phone call   []LMOM  []L/M to call office as no active Communication consent on file  []Unable to leave detailed message as VM not approved on Communication consent       Pharmacy advised by    [x]Fax  []Phone call  []Secure Chat    Specialty Pharmacy    []     Approval letter scanned into Media No - from covermymeds

## 2025-06-11 NOTE — TELEPHONE ENCOUNTER
PA for Zepbound 10mg SUBMITTED to [employer]    via    [x]CMM-KEY:  [id number]  []Surescripts-Case ID #   []Availity-Auth ID # NDC #   []Faxed to plan   []Other website   []Phone call Case ID #     []PA sent as URGENT    All office notes, labs and other pertaining documents and studies sent. Clinical questions answered. Awaiting determination from insurance company.     Turnaround time for your insurance to make a decision on your Prior Authorization can take 7-21 business days.

## 2025-06-25 DIAGNOSIS — K76.0 HEPATIC STEATOSIS: ICD-10-CM

## 2025-06-25 DIAGNOSIS — E66.811 OBESITY (BMI 30.0-34.9): ICD-10-CM

## 2025-06-25 DIAGNOSIS — I10 PRIMARY HYPERTENSION: ICD-10-CM

## 2025-06-26 RX ORDER — TIRZEPATIDE 10 MG/.5ML
10 INJECTION, SOLUTION SUBCUTANEOUS WEEKLY
Qty: 2 ML | Refills: 0 | Status: SHIPPED | OUTPATIENT
Start: 2025-06-26 | End: 2025-09-18

## 2025-06-30 ENCOUNTER — EVALUATION (OUTPATIENT)
Dept: PHYSICAL THERAPY | Facility: CLINIC | Age: 60
End: 2025-06-30
Payer: OTHER MISCELLANEOUS

## 2025-06-30 DIAGNOSIS — M25.571 CHRONIC PAIN OF RIGHT ANKLE: Primary | ICD-10-CM

## 2025-06-30 DIAGNOSIS — G89.29 CHRONIC PAIN OF RIGHT ANKLE: Primary | ICD-10-CM

## 2025-06-30 DIAGNOSIS — S82.831A OTHER CLOSED FRACTURE OF PROXIMAL END OF RIGHT FIBULA, INITIAL ENCOUNTER: ICD-10-CM

## 2025-06-30 PROCEDURE — 97112 NEUROMUSCULAR REEDUCATION: CPT | Performed by: PHYSICAL THERAPIST

## 2025-06-30 PROCEDURE — 97110 THERAPEUTIC EXERCISES: CPT | Performed by: PHYSICAL THERAPIST

## 2025-06-30 PROCEDURE — 97161 PT EVAL LOW COMPLEX 20 MIN: CPT | Performed by: PHYSICAL THERAPIST

## 2025-06-30 NOTE — PROGRESS NOTES
PT Evaluation     Today's date: 2025  Patient name: Maria Luz Ponce  : 1965  MRN: [id number]  Referring provider: Nico Swan DO  Dx:   Encounter Diagnosis     ICD-10-CM    1. Chronic pain of right ankle  M25.571     G89.29                      Assessment  Impairments: abnormal gait, abnormal or restricted ROM, activity intolerance, impaired balance, impaired physical strength, lacks appropriate home exercise program, pain with function, poor posture , poor body mechanics, activity limitations and endurance    Assessment details: Pt presents with signs and symptoms synonymous of admitting diagnosis as well as mechanical diagnosis of ankle derangement with DP of Semi loaded .  Pt presents with pain, decreased strength, decreased range, flexibility, as well as tolerance to activity and gait dysfunction.  Pt would benefit skilled PT intervention in order to address these impairments in order to be able to perform all desired activities with minimal to nil symptom exacerbation.  Thank you very much for this kind and motivated referral.     Understanding of Dx/Px/POC: good     Prognosis: good    Goals  STG 4 Weeks:  Decrease pain at worst to 4  Improve range to DF to 0  Improve strength to SL HR x 5 no pain  Independent with HEP  LTG 8 Weeks:  Decrease pain at worst to 2  Improve range to DF to 5  Improve strength to SL HR x 10 nil pain.    Able to perform all desired activities with minimal to nil symptom exacerbation      Plan  Patient would benefit from: skilled physical therapy  Planned modality interventions: cryotherapy and thermotherapy: hydrocollator packs    Planned therapy interventions: joint mobilization, manual therapy, neuromuscular re-education, postural training, stretching, strengthening, therapeutic activities, therapeutic exercise, therapeutic training, transfer training, IADL retraining, home exercise program, graded exercise, graded activity, gait training, functional ROM  exercises, flexibility, abdominal trunk stabilization, activity modification, behavior modification and body mechanics training    Frequency: 2x week  Duration in weeks: 10  Treatment plan discussed with: patient        Subjective Evaluation    History of Present Illness  Date of onset: 6/30/2025  Mechanism of injury: Pt is a 59 yofemale who presents today stating that she on 4/2/25 states that she stood up too fast, senses some lightheadedness and then subsequently fell. Pt reports that she had intentionally been losing weight, was on medication for this, was also on HTN medication thus the correlation of what may have occurred.  Pt reports that once she was conscious she had pain and was taken to ER.  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  Pt reports that once in ER imagery was taken + for avulsion fracture of R ankle, and proximal fibular fracture.  Was placed in CAM walker and then referred to orthopedics with Dr. Swan who encouraged letting fractures heal.  Pt recently met with him on 6/11/25 who indicated referral to PT and thus presents today.  Pt reports that she still has residual soreness pressure pain from mid lateral shin pain, states that there still some sensitivity on R proximal shin where fracture was.  Pt reports that ankle is tight and tender and worse at the end of the day.  Pt reports that there is swelling at the end of day.  Wearing sleeve, wearing brace, this has an improved sensation while wearing. Reports that she has some numbness in 2-4 toes primarily at the end of day.  Pt reports there is some weakness, contributes this for NWB x 4 weeks and then weaned out of CAM walker and then eventually to FWB and in sneaker.  Pt reports difficulty with sleeping due to L knee pain, long standing history correlates it with increased L LE use while NWB and weaning from CAM walker.  Pt reports goals  "are to reduce pain, strength, improve ability to stand, walk, sleep, improve ability to do stairs, improve swelling and get back to how she was prior to injury.  No numbness/ tingling in L LE, no recent change in bowel or bladder.    Quality of life: good    Patient Goals  Patient goals for therapy: increased strength, return to sport/leisure activities, increased motion, decreased pain and improved balance    Pain  Current pain ratin  At best pain ratin  At worst pain ratin  Quality: dull ache, sharp and tight  Relieving factors: change in position and rest  Aggravating factors: lifting, stair climbing, walking and standing  Progression: improved      Diagnostic Tests  X-ray: abnormal        Objective     Active Range of Motion   Left Ankle/Foot   Dorsiflexion (ke): 5 degrees   Plantar flexion: 70 degrees   Inversion: 40 degrees   Eversion: 25 degrees   Great toe extension: 70 degrees     Right Ankle/Foot   Dorsiflexion (ke): -5 degrees   Plantar flexion: 60 degrees   Inversion: 40 degrees with pain  Eversion: 20 degrees with pain  Great toe extension: 60 degrees     Additional Active Range of Motion Details  Forward head, rounded shoulders, Lordosis  Antalgia with R LE.    B/L Sensation intact to L3,4,5,S1,S2  LE Strength  Hip   L Flex Ext Abd Add all 5  R Flex Ext Abd Add all 5  LE Screen  Knee    L  all 5  R all 5  Ankle  L DF/PF/Gtoe all 5 SL HR 10 SLS 6\" lob  R DF/Gtoe 5, SL HR 2, SLS 10 mod sway.   Joint Mobility  Palpation  Sts  Figure 8:  L 47.5 cm R 48 cm    LE Repeated Movements  Semi loaded DF better PT OP Eversion, improved SL HR (just weak)                Precautions: Ankle/Prox Fib Fx 25, HTN, Hx of Asthma     Insurance Billing Rule ReEval POC expires Auth Status Total   Visits  Start date  Expiration date PT/OT + Visit Limit? Co-Insurance Misc   WC AMA 7/30/25 9/3/25 Approved 25 (auth s/p 25).   6/30/25 9/3/25 After 25 auth Yes x 20% HMBS, OOP Met                                       " "                    Visit/Unit Tracking  AUTH Status: approved Date 6/30                    Visits  Authed: 25 Used 1                     Remaining  24                              Manuals 6/30            DF/Gtoe PROM                                                     Neuro Re-Ed             Education and progression 10 Min            SLS R 20\" x 4            NBOS Foam EC             Tandem walking             Foam Side steps                                       Ther Ex             Semi Loaded DF  4 x 10            Progression?                                                     DL HR 2 x 10            SL HR 2 x 10                         Ther Activity             Eversion R* PT OP Imporved            SL HR \"Better\"            Other concerns?             Walking?                           Modalities             CP prn.                                "

## 2025-07-03 ENCOUNTER — OFFICE VISIT (OUTPATIENT)
Dept: PHYSICAL THERAPY | Facility: CLINIC | Age: 60
End: 2025-07-03
Payer: OTHER MISCELLANEOUS

## 2025-07-03 DIAGNOSIS — S82.831A OTHER CLOSED FRACTURE OF PROXIMAL END OF RIGHT FIBULA, INITIAL ENCOUNTER: ICD-10-CM

## 2025-07-03 DIAGNOSIS — M25.571 CHRONIC PAIN OF RIGHT ANKLE: Primary | ICD-10-CM

## 2025-07-03 DIAGNOSIS — G89.29 CHRONIC PAIN OF RIGHT ANKLE: Primary | ICD-10-CM

## 2025-07-03 PROCEDURE — 97140 MANUAL THERAPY 1/> REGIONS: CPT | Performed by: PHYSICAL THERAPIST

## 2025-07-03 PROCEDURE — 97110 THERAPEUTIC EXERCISES: CPT | Performed by: PHYSICAL THERAPIST

## 2025-07-03 PROCEDURE — 97112 NEUROMUSCULAR REEDUCATION: CPT | Performed by: PHYSICAL THERAPIST

## 2025-07-03 NOTE — PROGRESS NOTES
"Daily Note     Today's date: 7/3/2025  Patient name: Maria Luz Ponce  : 1965  MRN: [id number]  Referring provider: Nico Swan DO  Dx:   Encounter Diagnosis     ICD-10-CM    1. Chronic pain of right ankle  M25.571     G89.29       2. Other closed fracture of proximal end of right fibula, initial encounter  S82.391C                      Subjective: Pt presents today stating that her ankle and foot is feeling well.  No longer wearing brace.  Mild soreness at end range activities.        Objective: See treatment diary below      Assessment: Proceeded with endurance, balance and body weight resistance.  Continue to progress as able.       Precautions: Ankle/Prox Fib Fx 25, HTN, Hx of Asthma     Insurance Billing Rule ReEval POC expires Auth Status Total   Visits  Start date  Expiration date PT/OT + Visit Limit? Co-Insurance Misc   WC AMA 7/30/25 9/3/25 Approved 25 (auth s/p 25).   6/30/25 9/3/25 After 25 auth Yes x 20% HMBS, OOP Met                                                           Visit/Unit Tracking  AUTH Status: approved Date 6/30 7/3                   Visits  Authed: 25 Used 1 2                    Remaining  24 23                             Manuals 6/30 7/3           DF/Gtoe PROM   TAS                                                  Neuro Re-Ed             Education and progression 10 Min            SLS R 20\" x 4 20\" x 4           NBOS Foam EC  1 min           Tandem walking             Foam Side steps                                       Ther Ex             Semi Loaded DF  4 x 10 3 x 10           Progression?              Squat             Sit<>Stand                          DL HR 2 x 10 2 x 10           SL HR 2 x 10 4 x 5                        Ther Activity             Eversion R* PT OP Imporved Better.             SL HR \"Better\" Better (see 4 x 5).           Other concerns?             Walking?                           Modalities             CP prn.                                "

## 2025-07-14 ENCOUNTER — OFFICE VISIT (OUTPATIENT)
Dept: PHYSICAL THERAPY | Facility: CLINIC | Age: 60
End: 2025-07-14
Payer: OTHER MISCELLANEOUS

## 2025-07-14 DIAGNOSIS — G89.29 CHRONIC PAIN OF RIGHT ANKLE: Primary | ICD-10-CM

## 2025-07-14 DIAGNOSIS — M25.571 CHRONIC PAIN OF RIGHT ANKLE: Primary | ICD-10-CM

## 2025-07-14 DIAGNOSIS — S82.831A OTHER CLOSED FRACTURE OF PROXIMAL END OF RIGHT FIBULA, INITIAL ENCOUNTER: ICD-10-CM

## 2025-07-14 PROCEDURE — 97140 MANUAL THERAPY 1/> REGIONS: CPT | Performed by: PHYSICAL THERAPIST

## 2025-07-14 PROCEDURE — 97112 NEUROMUSCULAR REEDUCATION: CPT | Performed by: PHYSICAL THERAPIST

## 2025-07-14 PROCEDURE — 97110 THERAPEUTIC EXERCISES: CPT | Performed by: PHYSICAL THERAPIST

## 2025-07-14 NOTE — PROGRESS NOTES
"Daily Note     Today's date: 2025  Patient name: Maria Luz Ponce  : 1965  MRN: [id number]  Referring provider: Nico Swan DO  Dx:   Encounter Diagnosis     ICD-10-CM    1. Chronic pain of right ankle  M25.571     G89.29       2. Other closed fracture of proximal end of right fibula, initial encounter  S82.255A                      Subjective: Pt presents today stating that she is having some lateral foot soreness.  Returned from vacation a few days ago and feels as though her foot and ankle are all sore.        Objective: See treatment diary below  \"Soreness\" with walking.      Assessment:  Demonstrated improvement with full loaded DF.  Educated to perform this activities 4-6x's a day.  Monitor soreness and other symptoms.  Pt in accord.         Precautions: Ankle/Prox Fib Fx 25, HTN, Hx of Asthma     Insurance Billing Rule ReEval POC expires Auth Status Total   Visits  Start date  Expiration date PT/OT + Visit Limit? Co-Insurance Misc   WC AMA 7/30/25 9/3/25 Approved 25 (auth s/p 25).   6/30/25 9/3/25 After 25 auth Yes x 20% HMBS, OOP Met                                                           Visit/Unit Tracking  AUTH Status: approved Date 6/30 7/3 7/14                  Visits  Authed: 25 Used 1 2 3                   Remaining  24 23 22                            Manuals 6/30 7/3 7/14          DF/Gtoe PROM   TAS TAS                                                 Neuro Re-Ed             Education and progression 10 Min            SLS R 20\" x 4 20\" x 4 20\" x 4          NBOS Foam EC  1 min 1 min          Tandem walking             Foam Side steps                                       Ther Ex             Semi Loaded DF  4 x 10 3 x 10 2 x 10          Progression?              Squat   2 x 10          Sit<>Stand                          DL HR 2 x 10 2 x 10 2 x 10          SL HR 2 x 10 4 x 5 4 x 5                       Ther Activity             Eversion R* PT OP Imporved Better.   better       " "   SL HR \"Better\" Better (see 4 x 5). 2 x 10          Other concerns?             Walking?                           Modalities             CP prn.                                "

## 2025-07-17 ENCOUNTER — OFFICE VISIT (OUTPATIENT)
Dept: PHYSICAL THERAPY | Facility: CLINIC | Age: 60
End: 2025-07-17
Payer: OTHER MISCELLANEOUS

## 2025-07-17 DIAGNOSIS — M25.571 CHRONIC PAIN OF RIGHT ANKLE: Primary | ICD-10-CM

## 2025-07-17 DIAGNOSIS — G89.29 CHRONIC PAIN OF RIGHT ANKLE: Primary | ICD-10-CM

## 2025-07-17 DIAGNOSIS — S82.831A OTHER CLOSED FRACTURE OF PROXIMAL END OF RIGHT FIBULA, INITIAL ENCOUNTER: ICD-10-CM

## 2025-07-17 PROCEDURE — 97112 NEUROMUSCULAR REEDUCATION: CPT | Performed by: PHYSICAL THERAPIST

## 2025-07-17 PROCEDURE — 97140 MANUAL THERAPY 1/> REGIONS: CPT | Performed by: PHYSICAL THERAPIST

## 2025-07-17 PROCEDURE — 97110 THERAPEUTIC EXERCISES: CPT | Performed by: PHYSICAL THERAPIST

## 2025-07-17 NOTE — PROGRESS NOTES
"Daily Note     Today's date: 2025  Patient name: Maria Luz Ponce  : 1965  MRN: [id number]  Referring provider: Nico Swan DO  Dx:   Encounter Diagnosis     ICD-10-CM    1. Chronic pain of right ankle  M25.571     G89.29       2. Other closed fracture of proximal end of right fibula, initial encounter  S82.902D                      Subjective: Pt presents today stating that she is doing okay.  Mild soreness present.  No major increase in symptoms.        Objective: See treatment diary below  \"Soreness\" with walking.      Assessment:  Modified to DF full load with Gastroc on Slack due to knee stress.  Pt in accord.    Follow up nv in regards of how pt is feeling with this intervention.  Discussed safe return to recreational exercise.          Precautions: Ankle/Prox Fib Fx 25, HTN, Hx of Asthma     Insurance Billing Rule ReEval POC expires Auth Status Total   Visits  Start date  Expiration date PT/OT + Visit Limit? Co-Insurance Misc   WC AMA 7/30/25 9/3/25 Approved 25 (auth s/p 25).   6/30/25 9/3/25 After 25 auth Yes x 20% HMBS, OOP Met                                                           Visit/Unit Tracking  AUTH Status: approved Date 6/30 7/3 7/14 7/17                 Visits  Authed: 25 Used 1 2 3 4                   Remaining  24 23 22 21                           Manuals 6/30 7/3 7/14 7/17         DF/Gtoe PROM   TAS TAS TAS                                                Neuro Re-Ed             Education and progression 10 Min            SLS R 20\" x 4 20\" x 4 20\" x 4 20\" x 4         NBOS Foam EC  1 min 1 min 1 min         Tandem walking             Foam Side steps                                       Ther Ex             Semi Loaded DF  4 x 10 3 x 10 2 x 10 2 x 10 full loaded, Gastroc on Slack.           Progression?              Squat   2 x 10 2 x 10         Sit<>Stand                          DL HR 2 x 10 2 x 10 2 x 10 2 x 10         SL HR 2 x 10 4 x 5 4 x 5 4 x 5                    " "  Ther Activity             Eversion R* PT OP Imporved Better.   better better         SL HR \"Better\" Better (see 4 x 5). 2 x 10 2 x 10         Other concerns?             Walking?                           Modalities             CP prn.                                "

## 2025-07-21 ENCOUNTER — EVALUATION (OUTPATIENT)
Dept: PHYSICAL THERAPY | Facility: CLINIC | Age: 60
End: 2025-07-21
Payer: OTHER MISCELLANEOUS

## 2025-07-21 DIAGNOSIS — S82.831A OTHER CLOSED FRACTURE OF PROXIMAL END OF RIGHT FIBULA, INITIAL ENCOUNTER: ICD-10-CM

## 2025-07-21 DIAGNOSIS — M25.571 CHRONIC PAIN OF RIGHT ANKLE: Primary | ICD-10-CM

## 2025-07-21 DIAGNOSIS — G89.29 CHRONIC PAIN OF RIGHT ANKLE: Primary | ICD-10-CM

## 2025-07-21 PROCEDURE — 97110 THERAPEUTIC EXERCISES: CPT | Performed by: PHYSICAL THERAPIST

## 2025-07-21 PROCEDURE — 97112 NEUROMUSCULAR REEDUCATION: CPT | Performed by: PHYSICAL THERAPIST

## 2025-07-21 PROCEDURE — 97140 MANUAL THERAPY 1/> REGIONS: CPT | Performed by: PHYSICAL THERAPIST

## 2025-07-21 NOTE — PROGRESS NOTES
PT Evaluation     Today's date: 2025  Patient name: Maria Luz Ponce  : 1965  MRN: [id number]  Referring provider: Nico Swan DO  Dx:   Encounter Diagnosis     ICD-10-CM    1. Chronic pain of right ankle  M25.571     G89.29       2. Other closed fracture of proximal end of right fibula, initial encounter  S82.117F                        Assessment  Impairments: abnormal gait, abnormal or restricted ROM, activity intolerance, impaired balance, impaired physical strength, lacks appropriate home exercise program, pain with function, poor posture , poor body mechanics, activity limitations and endurance    Assessment details: Pt is progressing well at this time.  They have demonstrated improvement in pain levels, strength, range, flexibility as well as overall tolerance to activity.  They have achieved all STGs sought out for them as well as by them.  They will benefit continued Skilled PT intervention in order to achieve all LTGs sought out for them as well as by them in order to perform all desired activities with minimal to nil symptom exacerbation.  Thank you very much for this kind and motivated referral.        Understanding of Dx/Px/POC: good     Prognosis: good    Goals  STG 4 Weeks:  Decrease pain at worst to 4 -met  Improve range to DF to 0 -met  Improve strength to SL HR x 5 no pain -met  Independent with HEP -met  LTG 8 Weeks:  Decrease pain at worst to 2  Improve range to DF to 5  Improve strength to SL HR x 10 nil pain.    Able to perform all desired activities with minimal to nil symptom exacerbation      Plan  Patient would benefit from: skilled physical therapy  Planned modality interventions: cryotherapy and thermotherapy: hydrocollator packs    Planned therapy interventions: joint mobilization, manual therapy, neuromuscular re-education, postural training, stretching, strengthening, therapeutic activities, therapeutic exercise, therapeutic training, transfer training, IADL  retraining, home exercise program, graded exercise, graded activity, gait training, functional ROM exercises, flexibility, abdominal trunk stabilization, activity modification, behavior modification and body mechanics training    Frequency: 2x week  Duration in weeks: 10  Treatment plan discussed with: patient        Subjective Evaluation    History of Present Illness  Date of onset: 2025  Mechanism of injury: Pt presents today stating that she has found improvement.  Pain at worst has been a 3/10.  Pt reports that she has been active with work, performing tasks around home and states that overall content with progression.  Pt reports that HEP is going well.  States that her goals at this time are to further reduce pain, improve standing/walking tolerance, feel more balanced and return to recreational exercise without concern.  Pt reports that she follows up with Dr. Swan on 25.    Quality of life: good    Patient Goals  Patient goals for therapy: increased strength, return to sport/leisure activities, increased motion, decreased pain and improved balance    Pain  Current pain ratin  At best pain ratin  At worst pain rating: 3  Quality: dull ache, sharp and tight  Relieving factors: change in position and rest  Aggravating factors: lifting, stair climbing, walking and standing  Progression: improved      Diagnostic Tests  X-ray: abnormal        Objective     Active Range of Motion   Left Ankle/Foot   Dorsiflexion (ke): 5 degrees   Plantar flexion: 70 degrees   Inversion: 40 degrees   Eversion: 25 degrees   Great toe extension: 70 degrees     Right Ankle/Foot   Dorsiflexion (ke): 0 degrees   Plantar flexion: 65 degrees   Inversion: 40 degrees with pain  Eversion: 25 degrees with pain  Great toe extension: 60 degrees     Additional Active Range of Motion Details  Forward head, rounded shoulders, Lordosis  Antalgia with R LE.    B/L Sensation intact to L3,4,5,S1,S2  LE Strength  Hip   L Flex Ext Abd  "Add all 5  R Flex Ext Abd Add all 5  LE Screen  Knee    L  all 5  R all 5  Ankle  L DF/PF/Gtoe all 5, SL HR 10 SLS 10\" before lob  R DF/Gtoe 5, SL HR 10, SLS 10 mod sway.   Joint Mobility  Palpation  Sts  Figure 8:  L 47.5 cm R 48 cm    LE Repeated Movements  Semi loaded DF better PT OP Eversion, improved SL HR (just weak)  // SL HR R 5// Full loaded gastroc on slack, better.              Precautions: Ankle/Prox Fib Fx 4/2/25, HTN, Hx of Asthma     Insurance Billing Rule ReEval POC expires Auth Status Total   Visits  Start date  Expiration date PT/OT + Visit Limit? Co-Insurance Misc   WC AMA 7/30/25 9/3/25 Approved 25 (auth s/p 25).   6/30/25 9/3/25 After 25 auth Yes x 20% HMBS, OOP Met                                                           Visit/Unit Tracking  AUTH Status: approved Date 6/30 7/3 7/14 7/17 7/21                Visits  Authed: 25 Used 1 2 3 4  5                 Remaining  24 23 22 21 20                          Manuals 6/30 7/3 7/14 7/17 7/21        DF/Gtoe PROM   TAS TAS TAS TAS                                               Neuro Re-Ed             Education and progression 10 Min            SLS R 20\" x 4 20\" x 4 20\" x 4 20\" x 4 20\" x 4        NBOS Foam EC  1 min 1 min 1 min 1 min        Tandem walking             Foam Side steps     5 laps                                   Ther Ex             Semi Loaded DF  4 x 10 3 x 10 2 x 10 2 x 10 full loaded, Gastroc on Slack.   2 x 10 full loaded, gastroc on slack        Progression?              Squat   2 x 10 2 x 10 2 x 10        Sit<>Stand                          DL HR 2 x 10 2 x 10 2 x 10 2 x 10 2 x 10        SL HR 2 x 10 4 x 5 4 x 5 4 x 5 4 x 5                     Ther Activity             Eversion R* PT OP Imporved Better.   better better Better        SL HR \"Better\" Better (see 4 x 5). 2 x 10 2 x 10 2 x 10        Other concerns?             Walking?                           Modalities             CP prn.                                  "

## 2025-07-23 ENCOUNTER — OFFICE VISIT (OUTPATIENT)
Dept: OBGYN CLINIC | Facility: CLINIC | Age: 60
End: 2025-07-23
Payer: OTHER MISCELLANEOUS

## 2025-07-23 ENCOUNTER — APPOINTMENT (OUTPATIENT)
Dept: RADIOLOGY | Facility: AMBULARY SURGERY CENTER | Age: 60
End: 2025-07-23
Attending: STUDENT IN AN ORGANIZED HEALTH CARE EDUCATION/TRAINING PROGRAM
Payer: COMMERCIAL

## 2025-07-23 DIAGNOSIS — S82.831A OTHER CLOSED FRACTURE OF PROXIMAL END OF RIGHT FIBULA, INITIAL ENCOUNTER: ICD-10-CM

## 2025-07-23 DIAGNOSIS — M79.671 PAIN IN RIGHT FOOT: ICD-10-CM

## 2025-07-23 DIAGNOSIS — S82.831A OTHER CLOSED FRACTURE OF PROXIMAL END OF RIGHT FIBULA, INITIAL ENCOUNTER: Primary | ICD-10-CM

## 2025-07-23 PROCEDURE — 73630 X-RAY EXAM OF FOOT: CPT

## 2025-07-23 PROCEDURE — 73590 X-RAY EXAM OF LOWER LEG: CPT

## 2025-07-23 PROCEDURE — 99213 OFFICE O/P EST LOW 20 MIN: CPT | Performed by: STUDENT IN AN ORGANIZED HEALTH CARE EDUCATION/TRAINING PROGRAM

## 2025-07-23 NOTE — PROGRESS NOTES
[facility] [place]  [address]  [address]  [place] [address] [phone]       Maria Luz Ponce  [id number]  1965    ORTHOPAEDIC SURGERY OUTPATIENT NOTE  7/23/2025    Assessment & Plan  Other closed fracture of proximal end of right fibula, initial encounter  Date of Injury: 04/02/2025  X-ray imaging reviewed revealing healed proximal fibula fracture at length with patient in the office today  Activity as tolerated  Continue outpatient PT  Anti-inflammatories or Tylenol prn pain  Follow-up:  PRN    Orders:    XR tibia fibula 2 vw right; Future    Pain in right foot  Compensation secondary to right proximal fibula fracture   Xray reviewed negative for acute fracture or dislocation   Supportive shoes, OTC shoe inserts   Continue PT for strengthening of ankle and forefoot    Orders:    XR foot 3+ vw right; Future        The patient's diagnosis and treatment were discussed at length today. We discussed no treatment, non-operative treatment, and operative treatment.    Procedures  No procedures today.    Translation: No    HISTORY:  59 y.o. female who presents for evaluation of the right proximal fibula known proximal fibula fracture on 04/02/2025.  Patient continues to progress physical therapy with relief of symptoms.  Notes about 90 to 95% relief in pain since original visit.  She is now complaining of lateral right foot pain increased with long periods of activity.    Surgical History:  None    Previous Injection(s): none      The following portions of the patient's history were reviewed and updated as appropriate: allergies, current medications, past family history, past social history, past surgical history and problem list.    LMP 03/15/2019 (Exact Date)    Lab Results   Component Value Date    HGBA1C 5.4 10/31/2024         Past Medical History[1]    Past Surgical History[2]    Social History     Socioeconomic History    Marital status:  /Civil Union     Spouse name: Not on file    Number of children: Not on file    Years of education: Not on file    Highest education level: Not on file   Occupational History    Not on file   Tobacco Use    Smoking status: Never    Smokeless tobacco: Never   Vaping Use    Vaping status: Never Used   Substance and Sexual Activity    Alcohol use: Not Currently     Comment: social    Drug use: No    Sexual activity: Yes     Partners: Male     Birth control/protection: Female Sterilization   Other Topics Concern    Not on file   Social History Narrative    Not on file     Social Drivers of Health     Financial Resource Strain: Not At Risk (2/24/2025)    Received from [facility]    Financial Insecurity     In the last 12 months did you skip medications to save money?: No     In the last 12 months was there a time when you needed to see a doctor but could not because of cost?: No   Food Insecurity: No Food Insecurity (2/24/2025)    Received from [facility]    Food Insecurity     In the last 12 months did you ever eat less than you felt you should because there wasn't enough money for food?: No   Transportation Needs: No Transportation Needs (2/24/2025)    Received from [facility]    Transportation Needs     In the last 12 months have you ever had to go without healthcare because you didn't have a way to get there?: No   Physical Activity: Not on file   Stress: No Stress Concern Present (2/20/2024)    Received from [facility]    [employer] [place] [employer] - Occupational Stress Questionnaire     Feeling of Stress : Only a little   Social Connections: Socially Integrated (2/24/2025)    Received from [facility]    Social Connection     Do you often feel lonely?: No   Intimate Partner Violence: Unknown (10/23/2024)    Nursing IPS     Feels Physically and Emotionally Safe: Not on file     Physically Hurt by Someone:  Not on file     Humiliated or Emotionally Abused by Someone: Not on file     Physically Hurt by Someone: No     Hurt or Threatened by Someone: No   Housing Stability: Not At Risk (2/24/2025)    Received from [facility]    Housing Stability     Are you worried that in the next 2 months you may not have stable housing?: No       Family History[3]     Patient's Medications   New Prescriptions    No medications on file   Previous Medications    ACETAMINOPHEN (TYLENOL) 500 MG TABLET    Take 1,000 mg by mouth every 6 (six) hours as needed for mild pain    ALBUTEROL (PROVENTIL HFA,VENTOLIN HFA) 90 MCG/ACT INHALER    Inhale 2 puffs if needed    CHOLECALCIFEROL (VITAMIN D3) 3000 UNITS TABS    Take 3,000 Units by mouth in the morning.    DICLOFENAC SODIUM (VOLTAREN) 1 %    Apply 2 g topically if needed    DICYCLOMINE (BENTYL) 10 MG CAPSULE    TAKE 1 CAPSULE (10 MG TOTAL) BY MOUTH 3 (THREE) TIMES A DAY AS NEEDED (DIARRHEA AND ABDOMINAL PAIN)    DULOXETINE (CYMBALTA) 60 MG DELAYED RELEASE CAPSULE    Take 60 mg by mouth in the morning.    FAMOTIDINE (PEPCID) 10 MG TABLET    Take 10 mg by mouth in the morning    FEXOFENADINE (ALLEGRA) 180 MG TABLET    Take 180 mg by mouth as needed    FLUTICASONE (FLONASE) 50 MCG/ACT NASAL SPRAY    SPRAY 2 SPRAYS INTO EACH NOSTRIL 2 TIMES A DAY    FLUTICASONE-SALMETEROL (ADVAIR, WIXELA) 100-50 MCG/DOSE INHALER    Inhale 1 puff in the morning and 1 puff in the evening.    GABAPENTIN (NEURONTIN) 100 MG CAPSULE    Take 200 mg by mouth daily at bedtime    MELATONIN 3 MG    Take 3 mg by mouth daily at bedtime    MELOXICAM (MOBIC) 15 MG TABLET    Take 15 mg by mouth in the morning.    METHOCARBAMOL (ROBAXIN) 750 MG TABLET    Take 750 mg by mouth if needed    OMEGA-3 1000 MG CAPS    Take 1 capsule by mouth in the morning.    TIRZEPATIDE (ZEPBOUND) 10 MG/0.5 ML AUTO-INJECTOR    Inject 0.5 mL (10 mg total) under the skin once a week   Modified Medications    No medications on file    Discontinued Medications    VALSARTAN (DIOVAN) 80 MG TABLET    Take 1 tablet by mouth daily       Allergies[4]       REVIEW OF SYSTEMS:  Constitutional: Negative.    HEENT: Negative.    Respiratory: Negative.    Skin: Negative.    Neurological: Negative.    Psychiatric/Behavioral: Negative.  Musculoskeletal: Negative except for that mentioned in the HPI.    Gen: No acute distress, resting comfortably in bed  HEENT: Eyes clear, moist mucus membranes, hearing intact  Respiratory: No audible wheezing or stridor  Cardiovascular: Well Perfused peripherally, 2+ distal pulse  Abdomen: nondistended, no peritoneal signs     PHYSICAL EXAM:      Right Foot & Ankle Exam  Alignment:  Normal ankle alignment.  Inspection:  No swelling. No edema. No erythema.  Palpation:  Mild tenderness at lateral foot, base of 5th metatarsal, lis franc area.  ROM:  Normal ankle ROM.  Strength:  5/5 AT, GSC, PT, and peroneals.  Stability:  (-) Anterior  neutral. (-) Anterior  PF.  Tests:  Negative Squeeze test   Neurovascular:  Sensation intact in DP/SP/Vill[place]/Sa/T nerve distributions. 2+ DP & PT pulses.  Gait:  Normal.    IMAGING:  XR of right tibia/fibula - Healed fracture of proximal fibula. No acute osseous abnormality. XR right foot- Negative for acute fracture or dislocation. No acute osseous abnormality. I have reviewed the radiology report(s) and do not currently have a radiology reading from [facility], but will check the result once the reading is performed.      Electronic Medical Records were reviewed including Medications/Labs/Past Medical History , Previous Office Visit Note(s), and PT Note(s)    Deborah Osorio PA-C    Scribe Attestation      I,:  Deborah Osorio PA-C am acting as a scribe while in the presence of the attending physician.:       I,:  Nico Swan, DO personally performed the services described in this documentation    as scribed in my presence.:               Portions of the record may have  "been created with voice recognition software.  Occasional wrong word or \"sound a like\" substitutions may have occurred due to the inherent limitations of voice recognition software.  Read the chart carefully and recognize, using context, where substitutions have occurred. All patient's questions were answered to their satisfaction.           [1]   Past Medical History:  Diagnosis Date    Ankle fracture     Asthma     Cervical spinal stenosis     Closed left arm fracture, sequela     Concussion     Degeneration, intervertebral disc, cervical     Fibromyalgia, primary     Hypertension     Kidney stone     Migraine     Mixed hyperlipidemia 01/05/2021    Right arm fracture     Seasonal allergies     Shingles     Vitamin D deficiency    [2]   Past Surgical History:  Procedure Laterality Date    CARPAL TUNNEL RELEASE Bilateral     CATARACT EXTRACTION Bilateral     FL RETROGRADE PYELOGRAM  09/13/2024    FL RETROGRADE PYELOGRAM  10/25/2024    MOUTH SURGERY      AZ COLONOSCOPY FLX DX W/COLLJ SPEC WHEN PFRMD N/A 12/27/2018    Procedure: COLONOSCOPY;  Surgeon: Bita Montalvo MD;  [facility]: [facility];  Service: Gastroenterology    AZ CYSTO/URETERO W/LITHOTRIPSY &INDWELL STENT INSRT Left 09/13/2024    Procedure: CYSTOSCOPY URETEROSCOPY WITH RETROGRADE PYELOGRAM AND INSERTION STENT URETERAL;  Surgeon: Eder Johns MD;  Location: [facility];  Service: Urology    AZ CYSTO/URETERO W/LITHOTRIPSY &INDWELL STENT INSRT Left 10/25/2024    Procedure: CYSTO USCOPE, RETROGRADE PYLEOGRAM WITH BASKET EXTRACTION OF STONE;  Surgeon: Eder Johns MD;  [facility];  Service: Urology    AZ LAPAROSCOPY W TOTAL HYSTERECTOMY UTERUS 250 GM/< N/A 11/13/2024    Procedure: TOTAL LAPAROSCOPIC HYSTERECTOMY., BILATERAL SALPINGOOPHORECTOMY, CYSTOSCOPY WITH INSERTION URETERAL CATHETER;  Surgeon: Nahun Ricardo MD;  Location[facility];  Service: Gynecology Oncology    SINUS SURGERY      TUBAL LIGATION     [3]   Family " History  Problem Relation Name Age of Onset    Heart disease Father      No Known Problems Daughter nakia     No Known Problems Daughter deidre     No Known Problems Daughter mook     No Known Problems Maternal Grandmother      No Known Problems Maternal Grandfather      No Known Problems Paternal Grandmother      No Known Problems Paternal Grandfather      Uterine cancer Maternal Aunt shalini 75    Lung cancer Paternal Aunt          age unknown    Breast cancer Neg Hx     [4]   Allergies  Allergen Reactions    Amoxicillin-Pot Clavulanate Hives    Omnipaque [Iohexol] Vomiting    Penicillins

## 2025-07-23 NOTE — LETTER
July 23, 2025     Patient: Maria Luz Ponce  YOB: 1965  Date of Visit: 7/23/2025      To Whom it May Concern:    Maria Luz Ponce is under my professional care. Maria Luz was seen in my office on 7/23/2025. Maria Luz may return to work without restrictions.     If you have any questions or concerns, please don't hesitate to call.         Sincerely,          Nico Swan, DO        CC: No Recipients

## 2025-07-24 ENCOUNTER — OFFICE VISIT (OUTPATIENT)
Dept: PHYSICAL THERAPY | Facility: CLINIC | Age: 60
End: 2025-07-24
Payer: OTHER MISCELLANEOUS

## 2025-07-24 DIAGNOSIS — M25.571 CHRONIC PAIN OF RIGHT ANKLE: Primary | ICD-10-CM

## 2025-07-24 DIAGNOSIS — G89.29 CHRONIC PAIN OF RIGHT ANKLE: Primary | ICD-10-CM

## 2025-07-24 DIAGNOSIS — S82.831A OTHER CLOSED FRACTURE OF PROXIMAL END OF RIGHT FIBULA, INITIAL ENCOUNTER: ICD-10-CM

## 2025-07-24 PROCEDURE — 97140 MANUAL THERAPY 1/> REGIONS: CPT | Performed by: PHYSICAL THERAPIST

## 2025-07-24 PROCEDURE — 97110 THERAPEUTIC EXERCISES: CPT | Performed by: PHYSICAL THERAPIST

## 2025-07-24 PROCEDURE — 97112 NEUROMUSCULAR REEDUCATION: CPT | Performed by: PHYSICAL THERAPIST

## 2025-07-24 NOTE — PROGRESS NOTES
"Daily Note     Today's date: 2025  Patient name: Maria Luz Ponce  : 1965  MRN: [id number]  Referring provider: Nico Swan DO  Dx:   Encounter Diagnosis     ICD-10-CM    1. Chronic pain of right ankle  M25.571     G89.29       2. Other closed fracture of proximal end of right fibula, initial encounter  S82.251D                      Subjective: Pt presents today stating that visit with Dr. Swan went well.  Lateral foot soreness has improved.      Objective: See treatment diary below      Assessment:  Progressed with reps secondary to improving endurance.  Without pain t/o entire session.  Continue to progress as able.           Precautions: Ankle/Prox Fib Fx 25, HTN, Hx of Asthma     Insurance Billing Rule ReEval POC expires Auth Status Total   Visits  Start date  Expiration date PT/OT + Visit Limit? Co-Insurance Misc   WC AMA 7/30/25 9/3/25 Approved 25 (auth s/p 25).   6/30/25 9/3/25 After 25 auth Yes x 20% HMBS, OOP Met                                                           Visit/Unit Tracking  AUTH Status: approved Date 6/30 7/3 7/14 7/17 7/21 7/24               Visits  Authed: 25 Used 1 2 3 4  5 6                Remaining  24 23 22 21 20 19                         Manuals 6/30 7/3 7/14 7/17 7/21 7/24       DF/Gtoe PROM   TAS TAS TAS TAS TAS                                              Neuro Re-Ed             Education and progression 10 Min            SLS R 20\" x 4 20\" x 4 20\" x 4 20\" x 4 20\" x 4 20\" x 4       NBOS Foam EC  1 min 1 min 1 min 1 min 1 min       Tandem walking Foam      5 laps       Foam Side steps     5 laps  5 laps                                 Ther Ex             Semi Loaded DF  4 x 10 3 x 10 2 x 10 2 x 10 full loaded, Gastroc on Slack.   2 x 10 full loaded, gastroc on slack 2 x 10 full load gastroc on slack       Progression?              Squat   2 x 10 2 x 10 2 x 10 2 x 10       Sit<>Stand                          DL HR 2 x 10 2 x 10 2 x 10 2 x 10 2 x 10 2 x 10    " "   SL HR 2 x 10 4 x 5 4 x 5 4 x 5 4 x 5 2 x 10       LP + HR       90#      Ther Activity             Eversion R* PT OP Imporved Better.   better better Better        SL HR \"Better\" Better (see 4 x 5). 2 x 10 2 x 10 2 x 10 2 x 10       Other concerns?             Walking?                           Modalities             CP prn.                                "

## 2025-07-28 ENCOUNTER — OFFICE VISIT (OUTPATIENT)
Dept: PHYSICAL THERAPY | Facility: CLINIC | Age: 60
End: 2025-07-28
Payer: OTHER MISCELLANEOUS

## 2025-07-28 DIAGNOSIS — M25.571 CHRONIC PAIN OF RIGHT ANKLE: Primary | ICD-10-CM

## 2025-07-28 DIAGNOSIS — G89.29 CHRONIC PAIN OF RIGHT ANKLE: Primary | ICD-10-CM

## 2025-07-28 DIAGNOSIS — S82.831A OTHER CLOSED FRACTURE OF PROXIMAL END OF RIGHT FIBULA, INITIAL ENCOUNTER: ICD-10-CM

## 2025-07-28 PROCEDURE — 97110 THERAPEUTIC EXERCISES: CPT | Performed by: PHYSICAL THERAPIST

## 2025-07-28 PROCEDURE — 97112 NEUROMUSCULAR REEDUCATION: CPT | Performed by: PHYSICAL THERAPIST

## 2025-07-28 PROCEDURE — 97140 MANUAL THERAPY 1/> REGIONS: CPT | Performed by: PHYSICAL THERAPIST

## 2025-07-31 ENCOUNTER — APPOINTMENT (OUTPATIENT)
Dept: PHYSICAL THERAPY | Facility: CLINIC | Age: 60
End: 2025-07-31
Payer: OTHER MISCELLANEOUS

## 2025-08-04 ENCOUNTER — OFFICE VISIT (OUTPATIENT)
Dept: PHYSICAL THERAPY | Facility: CLINIC | Age: 60
End: 2025-08-04
Payer: OTHER MISCELLANEOUS

## 2025-08-04 DIAGNOSIS — S82.831A OTHER CLOSED FRACTURE OF PROXIMAL END OF RIGHT FIBULA, INITIAL ENCOUNTER: ICD-10-CM

## 2025-08-04 DIAGNOSIS — G89.29 CHRONIC PAIN OF RIGHT ANKLE: Primary | ICD-10-CM

## 2025-08-04 DIAGNOSIS — M25.571 CHRONIC PAIN OF RIGHT ANKLE: Primary | ICD-10-CM

## 2025-08-04 PROCEDURE — 97112 NEUROMUSCULAR REEDUCATION: CPT | Performed by: PHYSICAL THERAPIST

## 2025-08-04 PROCEDURE — 97110 THERAPEUTIC EXERCISES: CPT | Performed by: PHYSICAL THERAPIST

## 2025-08-07 ENCOUNTER — OFFICE VISIT (OUTPATIENT)
Dept: PHYSICAL THERAPY | Facility: CLINIC | Age: 60
End: 2025-08-07
Payer: OTHER MISCELLANEOUS

## 2025-08-07 DIAGNOSIS — S82.831A OTHER CLOSED FRACTURE OF PROXIMAL END OF RIGHT FIBULA, INITIAL ENCOUNTER: ICD-10-CM

## 2025-08-07 DIAGNOSIS — M25.571 CHRONIC PAIN OF RIGHT ANKLE: Primary | ICD-10-CM

## 2025-08-07 DIAGNOSIS — G89.29 CHRONIC PAIN OF RIGHT ANKLE: Primary | ICD-10-CM

## 2025-08-07 PROCEDURE — 97110 THERAPEUTIC EXERCISES: CPT | Performed by: PHYSICAL MEDICINE & REHABILITATION

## 2025-08-07 PROCEDURE — 97112 NEUROMUSCULAR REEDUCATION: CPT | Performed by: PHYSICAL MEDICINE & REHABILITATION

## 2025-08-08 ENCOUNTER — OFFICE VISIT (OUTPATIENT)
Dept: BARIATRICS | Facility: CLINIC | Age: 60
End: 2025-08-08
Payer: COMMERCIAL

## 2025-08-08 VITALS
SYSTOLIC BLOOD PRESSURE: 118 MMHG | WEIGHT: 187.4 LBS | HEIGHT: 66 IN | DIASTOLIC BLOOD PRESSURE: 76 MMHG | RESPIRATION RATE: 16 BRPM | HEART RATE: 83 BPM | BODY MASS INDEX: 30.12 KG/M2 | TEMPERATURE: 97.8 F

## 2025-08-08 DIAGNOSIS — E66.811 OBESITY (BMI 30.0-34.9): ICD-10-CM

## 2025-08-08 DIAGNOSIS — I10 PRIMARY HYPERTENSION: ICD-10-CM

## 2025-08-08 DIAGNOSIS — E78.2 MIXED HYPERLIPIDEMIA: ICD-10-CM

## 2025-08-08 DIAGNOSIS — E66.811 CLASS 1 OBESITY DUE TO EXCESS CALORIES WITH SERIOUS COMORBIDITY AND BODY MASS INDEX (BMI) OF 30.0 TO 30.9 IN ADULT: Primary | ICD-10-CM

## 2025-08-08 DIAGNOSIS — K76.0 HEPATIC STEATOSIS: ICD-10-CM

## 2025-08-08 DIAGNOSIS — E66.09 CLASS 1 OBESITY DUE TO EXCESS CALORIES WITH SERIOUS COMORBIDITY AND BODY MASS INDEX (BMI) OF 30.0 TO 30.9 IN ADULT: Primary | ICD-10-CM

## 2025-08-08 PROCEDURE — 99214 OFFICE O/P EST MOD 30 MIN: CPT | Performed by: PHYSICIAN ASSISTANT

## 2025-08-08 RX ORDER — TIRZEPATIDE 10 MG/.5ML
10 INJECTION, SOLUTION SUBCUTANEOUS WEEKLY
Qty: 6 ML | Refills: 1 | Status: SHIPPED | OUTPATIENT
Start: 2025-08-08 | End: 2026-01-23

## 2025-08-11 ENCOUNTER — OFFICE VISIT (OUTPATIENT)
Dept: PHYSICAL THERAPY | Facility: CLINIC | Age: 60
End: 2025-08-11
Payer: OTHER MISCELLANEOUS

## 2025-08-13 ENCOUNTER — OFFICE VISIT (OUTPATIENT)
Dept: PHYSICAL THERAPY | Facility: CLINIC | Age: 60
End: 2025-08-13
Payer: OTHER MISCELLANEOUS

## 2025-08-18 ENCOUNTER — OFFICE VISIT (OUTPATIENT)
Dept: PHYSICAL THERAPY | Facility: CLINIC | Age: 60
End: 2025-08-18
Payer: OTHER MISCELLANEOUS

## 2025-08-18 ENCOUNTER — HOSPITAL ENCOUNTER (OUTPATIENT)
Dept: BONE DENSITY | Facility: MEDICAL CENTER | Age: 60
Discharge: HOME | End: 2025-08-18
Payer: COMMERCIAL

## 2025-08-18 DIAGNOSIS — G89.29 CHRONIC PAIN OF RIGHT ANKLE: Primary | ICD-10-CM

## 2025-08-18 DIAGNOSIS — M25.571 CHRONIC PAIN OF RIGHT ANKLE: Primary | ICD-10-CM

## 2025-08-18 DIAGNOSIS — S82.831A OTHER CLOSED FRACTURE OF PROXIMAL END OF RIGHT FIBULA, INITIAL ENCOUNTER: ICD-10-CM

## 2025-08-18 PROCEDURE — 97140 MANUAL THERAPY 1/> REGIONS: CPT | Performed by: PHYSICAL THERAPIST

## 2025-08-18 PROCEDURE — 97110 THERAPEUTIC EXERCISES: CPT | Performed by: PHYSICAL THERAPIST

## 2025-08-18 PROCEDURE — 77080 DXA BONE DENSITY AXIAL: CPT

## 2025-08-18 PROCEDURE — 97112 NEUROMUSCULAR REEDUCATION: CPT | Performed by: PHYSICAL THERAPIST

## (undated) DEVICE — LIGHT GLOVE GREEN

## (undated) DEVICE — INSUFFLATION TUBING PRIMFLO

## (undated) DEVICE — 3M™ IOBAN™ 2 ANTIMICROBIAL INCISE DRAPE 6648EZ: Brand: IOBAN™ 2

## (undated) DEVICE — BETHLEHEM UNIVERSAL GYN LAP PK: Brand: CARDINAL HEALTH

## (undated) DEVICE — TROCAR: Brand: KII FIOS FIRST ENTRY

## (undated) DEVICE — TROCAR: Brand: KII® SLEEVE

## (undated) DEVICE — INTENDED FOR TISSUE SEPARATION, AND OTHER PROCEDURES THAT REQUIRE A SHARP SURGICAL BLADE TO PUNCTURE OR CUT.: Brand: BARD-PARKER SAFETY BLADES SIZE 10, STERILE

## (undated) DEVICE — PACK TUR

## (undated) DEVICE — DRAPE EQUIPMENT RF WAND

## (undated) DEVICE — LAPAROTOMY DRAPE WITH POUCHES: Brand: CONVERTORS

## (undated) DEVICE — SPECIMEN CONTAINER STERILE PEEL PACK

## (undated) DEVICE — GLOVE SRG BIOGEL 7.5

## (undated) DEVICE — POOLE SUCTION HANDLE: Brand: CARDINAL HEALTH

## (undated) DEVICE — IV EXTENSION TUBING 33 IN

## (undated) DEVICE — SUT VICRYL 2-0 CT-1 36 IN J945H

## (undated) DEVICE — SYRINGE 50ML LL

## (undated) DEVICE — TELFA NON-ADHERENT ABSORBENT DRESSING: Brand: TELFA

## (undated) DEVICE — LAPAROSCOPIC TROCAR SLEEVE/SINGLE USE: Brand: KII® OPTICAL ACCESS SYSTEM

## (undated) DEVICE — SPONGE STICK WITH PVP-I: Brand: KENDALL

## (undated) DEVICE — TUBING SUCTION 5MM X 12 FT

## (undated) DEVICE — DRAPE FLUID WARMER (BIRD BATH)

## (undated) DEVICE — DRAPE SURGIKIT SADDLE BAG LAP

## (undated) DEVICE — UNDER BUTTOCKS DRAPE: Brand: CONVERTORS

## (undated) DEVICE — ENDOPATH XCEL BLADELESS TROCARS WITH STABILITY SLEEVES: Brand: ENDOPATH XCEL

## (undated) DEVICE — UROLOGIC DRAIN BAG: Brand: UNBRANDED

## (undated) DEVICE — GUIDEWIRE STRGHT TIP 0.035 IN  SOLO PLUS

## (undated) DEVICE — CATH URETERAL 5FR X 70 CM FLEX TIP POLYUR BARD

## (undated) DEVICE — CHLORAPREP HI-LITE 26ML ORANGE

## (undated) DEVICE — NEEDLE 25G X 1 1/2

## (undated) DEVICE — INTENDED FOR TISSUE SEPARATION, AND OTHER PROCEDURES THAT REQUIRE A SHARP SURGICAL BLADE TO PUNCTURE OR CUT.: Brand: BARD-PARKER SAFETY BLADES SIZE 11, STERILE

## (undated) DEVICE — PREMIUM DRY TRAY LF: Brand: MEDLINE INDUSTRIES, INC.

## (undated) DEVICE — SCD SEQUENTIAL COMPRESSION COMFORT SLEEVE MEDIUM KNEE LENGTH: Brand: KENDALL SCD

## (undated) DEVICE — CATH URET .038 10FR 50CM DUAL LUMEN

## (undated) DEVICE — [HIGH FLOW INSUFFLATOR,  DO NOT USE IF PACKAGE IS DAMAGED,  KEEP DRY,  KEEP AWAY FROM SUNLIGHT,  PROTECT FROM HEAT AND RADIOACTIVE SOURCES.]: Brand: PNEUMOSURE

## (undated) DEVICE — 3M™ STERI-STRIP™ REINFORCED ADHESIVE SKIN CLOSURES, R1547, 1/2 IN X 4 IN (12 MM X 100 MM), 6 STRIPS/ENVELOPE: Brand: 3M™ STERI-STRIP™

## (undated) DEVICE — SEPRA FILM 6 X 5

## (undated) DEVICE — SUT VICRYL 0 CT-1 36 IN J946H

## (undated) DEVICE — BETHLEHEM UNIVERSAL LAPAROTOMY: Brand: CARDINAL HEALTH

## (undated) DEVICE — 3M™ TEGADERM™ TRANSPARENT FILM DRESSING FRAME STYLE, 1624W, 2-3/8 IN X 2-3/4 IN (6 CM X 7 CM), 100/CT 4CT/CASE: Brand: 3M™ TEGADERM™

## (undated) DEVICE — GLOVE INDICATOR PI UNDERGLOVE SZ 8 BLUE

## (undated) DEVICE — MASTISOL LIQ ADHESIVE 2/3ML

## (undated) DEVICE — LAPAROSCOPIC SMOKE EVAC TUBING

## (undated) DEVICE — 3000CC GUARDIAN II: Brand: GUARDIAN

## (undated) DEVICE — SINGLE PORT MANIFOLD: Brand: NEPTUNE 2

## (undated) DEVICE — 2000CC GUARDIAN II: Brand: GUARDIAN

## (undated) DEVICE — MAYO STAND COVER: Brand: CONVERTORS

## (undated) DEVICE — OCCLUDER COLPO-PNEUMO

## (undated) DEVICE — INVIEW CLEAR LEGGINGS: Brand: CONVERTORS

## (undated) DEVICE — ENDOPATH XCEL UNIVERSAL TROCAR STABLILITY SLEEVES: Brand: ENDOPATH XCEL

## (undated) DEVICE — DRAPE TOWEL: Brand: CONVERTORS

## (undated) DEVICE — BASKET SPECIMEN RETRIVAL 1.9FR 120CM

## (undated) DEVICE — SYRINGE 20ML LL

## (undated) DEVICE — TRAP,MUCUS SPECIMEN, 80CC: Brand: MEDLINE

## (undated) DEVICE — 3M™ STERI-STRIP™ COMPOUND BENZOIN TINCTURE 40 BAGS/CARTON 4 CARTONS/CASE C1544: Brand: 3M™ STERI-STRIP™

## (undated) DEVICE — SUT MONOCRYL 4-0 PS-2 27 IN Y426H

## (undated) DEVICE — SUT VICRYL 0 REEL 54 IN J287G

## (undated) DEVICE — SHEATH URETERAL ACCESS 12/14FR 35CM PROXIS

## (undated) DEVICE — TROCAR SITE CLOSURE DEVICE: Brand: ENDO CLOSE

## (undated) DEVICE — TRAY FOLEY 16FR URIMETER SILICONE SURESTEP

## (undated) DEVICE — PROXIMATE SKIN STAPLERS (35 WIDE) CONTAINS 35 STAINLESS STEEL STAPLES (FIXED HEAD): Brand: PROXIMATE

## (undated) DEVICE — LUBRICANT JELLY SURGILUBE TUBE 2OZ FLIP TOP

## (undated) DEVICE — MICRO HVTSA, 0.5G AND HVTSA SOURCEMARK PRODUCT CODE M1206 AND M1206-01: Brand: EXOFIN MICRO HVTSA, 0.5G

## (undated) DEVICE — UTERINE MANIPULATOR RUMI 5.1 X 6 CM

## (undated) DEVICE — SUT PDS II 1 TP-1 96 IN Z880G

## (undated) DEVICE — BLUE HEAT SCOPE WARMER

## (undated) DEVICE — METZENBAUM ADTEC SINGLE USE DISSECTING SCISSORS, SHAFT ONLY, MONOPOLAR, CURVED TO LEFT, WORKING LENGTH: 12 1/4", (310 MM), DIAM. 5 MM, INSULATED, DOUBLE ACTION, STERILE, DISPOSABLE, PACKAGE OF 10 PIECES: Brand: AESCULAP

## (undated) DEVICE — ABDOMINAL PAD: Brand: DERMACEA

## (undated) DEVICE — GLOVE PI ULTRA TOUCH SZ.7.5

## (undated) DEVICE — EXIDINE 4 PCT

## (undated) DEVICE — ANTIBACTERIAL UNDYED BRAIDED (POLYGLACTIN 910), SYNTHETIC ABSORBABLE SUTURE: Brand: COATED VICRYL

## (undated) DEVICE — MEDI-VAC YANKAUER SUCTION HANDLE W/BULBOUS AND CONTROL VENT: Brand: CARDINAL HEALTH

## (undated) DEVICE — SUT STRATAFIX SPIRAL 2-0 CT-1 30 CM SXPD1B401

## (undated) DEVICE — TROCAR PORT ACCESS 12 X120MM W/BLDLS OPTICAL TIP AIRSEAL

## (undated) DEVICE — EXOFIN PRECISION PEN HIGH VISCOSITY TOPICAL SKIN ADHESIVE: Brand: EXOFIN PRECISION PEN, 1G